# Patient Record
Sex: MALE | Race: BLACK OR AFRICAN AMERICAN | Employment: UNEMPLOYED | URBAN - METROPOLITAN AREA
[De-identification: names, ages, dates, MRNs, and addresses within clinical notes are randomized per-mention and may not be internally consistent; named-entity substitution may affect disease eponyms.]

---

## 2021-02-16 ENCOUNTER — APPOINTMENT (OUTPATIENT)
Dept: CT IMAGING | Age: 81
End: 2021-02-16
Payer: MEDICARE

## 2021-02-16 ENCOUNTER — HOSPITAL ENCOUNTER (EMERGENCY)
Age: 81
Discharge: HOME | End: 2021-02-17
Attending: EMERGENCY MEDICINE
Payer: MEDICARE

## 2021-02-16 ENCOUNTER — APPOINTMENT (OUTPATIENT)
Dept: GENERAL RADIOLOGY | Age: 81
End: 2021-02-16
Payer: MEDICARE
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
## 2021-02-16 DIAGNOSIS — S43.005A DISLOCATION OF LEFT SHOULDER JOINT, INITIAL ENCOUNTER: ICD-10-CM

## 2021-02-16 PROCEDURE — 23650 CLTX SHO DSLC W/MNPJ WO ANES: CPT

## 2021-02-16 PROCEDURE — 99285 EMERGENCY DEPT VISIT HI MDM: CPT

## 2021-02-16 PROCEDURE — 70450 CT HEAD/BRAIN W/O DYE: CPT

## 2021-02-16 PROCEDURE — 73030 X-RAY EXAM OF SHOULDER: CPT

## 2021-02-16 RX ORDER — LISINOPRIL 40 MG/1
40 TABLET ORAL DAILY
COMMUNITY

## 2021-02-16 RX ORDER — M-VIT,TX,IRON,MINS/CALC/FOLIC 27MG-0.4MG
1 TABLET ORAL DAILY
COMMUNITY

## 2021-02-16 RX ORDER — AMLODIPINE BESYLATE 10 MG/1
10 TABLET ORAL DAILY
COMMUNITY

## 2021-02-16 RX ORDER — FLUTICASONE PROPIONATE 50 MCG
1 SPRAY, SUSPENSION (ML) NASAL DAILY
COMMUNITY

## 2021-02-16 ASSESSMENT — PAIN DESCRIPTION - FREQUENCY: FREQUENCY: CONTINUOUS

## 2021-02-17 ENCOUNTER — APPOINTMENT (OUTPATIENT)
Dept: GENERAL RADIOLOGY | Age: 81
End: 2021-02-17
Payer: MEDICARE

## 2021-02-17 VITALS
BODY MASS INDEX: 24.04 KG/M2 | DIASTOLIC BLOOD PRESSURE: 69 MMHG | HEART RATE: 81 BPM | SYSTOLIC BLOOD PRESSURE: 133 MMHG | OXYGEN SATURATION: 100 % | RESPIRATION RATE: 12 BRPM | WEIGHT: 171.74 LBS | HEIGHT: 71 IN | TEMPERATURE: 98.4 F

## 2021-02-17 PROCEDURE — 99285 EMERGENCY DEPT VISIT HI MDM: CPT

## 2021-02-17 PROCEDURE — 2700000000 HC OXYGEN THERAPY PER DAY

## 2021-02-17 PROCEDURE — 94761 N-INVAS EAR/PLS OXIMETRY MLT: CPT

## 2021-02-17 PROCEDURE — 23650 CLTX SHO DSLC W/MNPJ WO ANES: CPT

## 2021-02-17 PROCEDURE — 73030 X-RAY EXAM OF SHOULDER: CPT

## 2021-02-17 PROCEDURE — 2500000003 HC RX 250 WO HCPCS: Performed by: EMERGENCY MEDICINE

## 2021-02-17 RX ORDER — ETOMIDATE 2 MG/ML
12 INJECTION INTRAVENOUS ONCE
Status: COMPLETED | OUTPATIENT
Start: 2021-02-17 | End: 2021-02-17

## 2021-02-17 RX ADMIN — ETOMIDATE INJECTION 12 MG: 2 SOLUTION INTRAVENOUS at 01:03

## 2021-02-17 ASSESSMENT — ENCOUNTER SYMPTOMS
FACIAL SWELLING: 0
BACK PAIN: 0
VOMITING: 0
COLOR CHANGE: 0

## 2021-02-17 NOTE — ED NOTES
Sling and swoth in place. Reviewed discharge instructions and f/u w transport team to go back to [facility].      Edi Garcia RN  02/17/21 [id number]

## 2021-02-17 NOTE — ED TRIAGE NOTES
From [facility], unwitnessed fall at 1900 2/16. pt states he fell out of his w/c but he has confusion at his baseline. cc: left shoulder pain. 100 mcg of fentanyl given by squad.  Distal pulses present

## 2021-02-17 NOTE — ED NOTES
Jillian F Nurse Thai notified of pt return w report for f/u and discharge instructions.       Bethany Xiong RN  02/17/21 [id number]

## 2021-02-17 NOTE — ED PROVIDER NOTES
[address] [facility]      Pt Name: Saima Gill  MRN: [id number]  Of[place] 1940  Date of evaluation: 2/16/2021  Provider: Velvet Maldonado MD    CHIEF COMPLAINT       Chief Complaint   Patient presents with    Fall     From [facility], unwitnessed fall at 1900 2/16. pt states he fell out of his w/c but he has confusion at his baseline. cc: left shoulder pain. 100 mcg of fentanyl given by squad. HISTORY OF PRESENT ILLNESS    Saima Gill is a [de-identified] y.o. male who presents to the emergency department with fall. Patient with unwitnessed fall at nursing home. Patient with confusion at his baseline per nursing home therefore history limited. Is complaining of left shoulder pain. Has no other complaints. No other associated symptoms. Nursing Notes were reviewed. Including nursing noted for FM, Surgical History, Past Medical History, Social History, vitals, and allergies; agree with all. REVIEW OF SYSTEMS       Review of Systems   Musculoskeletal: Positive for arthralgias (Shoulder pain). Psychiatric/Behavioral: Positive for confusion. Except as noted above the remainder of the review of systems was reviewed and negative. PAST MEDICAL HISTORY     Past Medical History:   Diagnosis Date    Alcohol abuse     AV block, 1st degree     Hypertension        SURGICAL HISTORY     History reviewed. No pertinent surgical history.     CURRENT MEDICATIONS       Discharge Medication List as of 2/17/2021  2:48 AM      CONTINUE these medications which have NOT CHANGED    Details   amLODIPine (NORVASC) 10 MG tablet Take 10 mg by mouth dailyHistorical Med      fluticasone (FLONASE) 50 MCG/ACT nasal spray 1 spray by Each Nostril route dailyHistorical Med      lisinopril (PRINIVIL;ZESTRIL) 40 MG tablet Take 40 mg by mouth dailyHistorical Med      metoprolol tartrate (LOPRESSOR) 25 MG tablet Take 25 mg by mouth 2 times dailyHistorical Med Multiple Vitamins-Minerals (THERAPEUTIC MULTIVITAMIN-MINERALS) tablet Take 1 tablet by mouth dailyHistorical Med             ALLERGIES     Patient has no known allergies. FAMILY HISTORY      History reviewed. No pertinent family history. SOCIAL HISTORY       Social History     Socioeconomic History    Marital status: Single     Spouse name: None    Number of children: None    Years of education: None    Highest education level: None   Occupational History    None   Social Needs    Financial resource strain: None    Food insecurity     Worry: None     Inability: None    Transportation needs     Medical: None     Non-medical: None   Tobacco Use    Smoking status: Former Smoker   Substance and Sexual Activity    Alcohol use: None    Drug use: None    Sexual activity: None   Lifestyle    Physical activity     Days per week: None     Minutes per session: None    Stress: None   Relationships    Social connections     Talks on phone: None     Gets together: None     Attends Rastafari service: None     Active member of club or organization: None     Attends meetings of clubs or organizations: None     Relationship status: None    Intimate partner violence     Fear of current or ex partner: None     Emotionally abused: None     Physically abused: None     Forced sexual activity: None   Other Topics Concern    None   Social History Narrative    None       PHYSICAL EXAM       ED Triage Vitals [02/16/21 2315]   BP Temp Temp Source Pulse Resp SpO2 Height Weight   126/67 98.4 °F (36.9 °C) Oral 126 18 100 % 5' 11\" (1.803 m) 171 lb 11.8 oz (77.9 kg)       Physical Exam  Vitals signs and nursing note reviewed. Constitutional:       General: He is not in acute distress. Appearance: He is well-developed. He is not diaphoretic. HENT:      Head: Normocephalic and atraumatic. Eyes:      General:         Right eye: No discharge. Left eye: No discharge. Pupils: Pupils are equal, round, and reactive to light. Neck:      Musculoskeletal: Normal range of motion. Thyroid: No thyromegaly. Trachea: No tracheal deviation. Cardiovascular:      Rate and Rhythm: Normal rate and regular rhythm. Heart sounds: No murmur. Pulmonary:      Breath sounds: No wheezing or rales. Chest:      Chest wall: No tenderness. Abdominal:      General: There is no distension. Palpations: Abdomen is soft. There is no mass. Tenderness: There is no abdominal tenderness. There is no guarding or rebound. Musculoskeletal:      Comments: Left shoulder deformity appears dislocated otherwise neurovascularly intact   Skin:     General: Skin is warm. Coloration: Skin is not pale. Findings: No erythema or rash. Neurological:      Mental Status: He is alert. Mental status is at baseline. Cranial Nerves: No cranial nerve deficit. Motor: No abnormal muscle tone. Coordination: Coordination normal.           DIAGNOSTIC RESULTS     EKG: All EKG's are interpreted by the Emergency Department Physician who either signs or Co-signs this chart in the absence of acardiologist.    None    RADIOLOGY:   Non-plain film images such as CT, Ultrasoundand MRI are read by the radiologist. Plain radiographic images are visualized and preliminarily interpreted by the emergency physician with the below findings:    Impression   1. Status post reduction of the left shoulder.  No acute fracture identified. Impression   No acute hemorrhage or midline shift.         Impression   Anterior left shoulder dislocation.         ED BEDSIDE ULTRASOUND:   Performed by ED Physician - none    LABS:  Labs Reviewed - No data to display    All other labs were withinnormal range or not returned as of this dictation.     EMERGENCY DEPARTMENT COURSE and DIFFERENTIAL DIAGNOSIS/MDM: PMH, Surgical Hx, FH, Social Hx reviewed by myself (ETOH usage, Tobacco usage, Drug usage reviewed by myself, no pertinent Hx)- No Pertinent Hx     Old records were reviewed by me    Shoulder reduced without difficulty. Asymptomatic upon discharge. Sling in place. Feels better. Close PCP follow-up. I estimate there is LOW risk for Sepsis, MI, Stroke, Tamponade, PTX, Toxicity or other life threatening etiology thus I consider the discharge disposition reasonable. The patient is at low risk for mortality based on demographic, history and clinical factors. Given the best available information and clinical assessment, I estimate the risk of hospitalization to be greater than risk of treatment at home. I have explained to the patient that the risk could rapidly change, given precautions for return and instructions. Explained to patient that the risk for mortality is low based on demographic, history and clinical factors. I discussed with patient the results of evaluation in the ED, diagnosis, care, and prognosis. The plan is to discharge to home. Patient is in agreement with plan and questions have been answered. I also discussed with patient the reasons which may require a return visit and the importance of follow-up care. The patient is well-appearing, nontoxic, and improved at the time of discharge. Patient agrees to call to arrange follow-up care as directed. Patient understands to return immediately for worsening/change in symptoms. CRITICAL CARE TIME   Total Critical Caretime was 39 minutes, excluding separately reportable procedures. There was a high probability of clinically significant/life threatening deterioration in the patient's condition which required my urgent intervention.         PROCEDURES:  Procedural sedation    Date/Time: 2/17/2021 4:15 AM  Performed by: Payton Hope MD  Authorized by: Payton Hope MD     Consent:     Consent obtained:  Verbal Consent given by: Family. Risks discussed: Allergic reaction, dysrhythmia, inadequate sedation, nausea, prolonged hypoxia resulting in organ damage, prolonged sedation necessitating reversal, respiratory compromise necessitating ventilatory assistance and intubation and vomiting  Indications:     Procedure performed:  Dislocation reduction    Procedure necessitating sedation performed by:  Physician performing sedation    Intended level of sedation:  Moderate (conscious sedation)  Pre-sedation assessment:     NPO status caution: urgency dictates proceeding with non-ideal NPO status      ASA classification: class 1 - normal, healthy patient      Neck mobility: normal      Mouth opening:  3 or more finger widths    Mallampati score:  I - soft palate, uvula, fauces, pillars visible    Pre-sedation assessments completed and reviewed: airway patency      History of difficult intubation: no    Immediate pre-procedure details:     Reassessment: Patient reassessed immediately prior to procedure      Reviewed: vital signs      Verified: bag valve mask available, emergency equipment available, intubation equipment available and IV patency confirmed    Procedure details (see MAR for exact dosages):     Preoxygenation:  Nasal cannula    Sedation:  Etomidate    Intra-procedure monitoring:  Blood pressure monitoring, cardiac monitor, continuous pulse oximetry and continuous capnometry  Post-procedure details:     Attendance: Constant attendance by certified staff until patient recovered      Recovery: Patient returned to pre-procedure baseline      Post-sedation assessments completed and reviewed: airway patency, cardiovascular function, hydration status, mental status, nausea/vomiting and pain level      Patient tolerance: Tolerated well, no immediate complications      FINAL IMPRESSION      1. Fall from wheelchair, initial encounter    2.  Dislocation of left shoulder joint, initial encounter          DISPOSITION/PLAN DISPOSITION Decision To Discharge 02/17/2021 01:30:04 AM    PATIENT REFERRED TO:  Iftikhar Marcos MD  [address] [facility]  [address] [place] [address]  [phone]    Schedule an appointment as soon as possible for a visit       Louis Pollock MD    Schedule an appointment as soon as possible for a visit       [facility] Emergency Department  [address] [address]  [phone]  Go to   As needed      DISCHARGE MEDICATIONS:  Discharge Medication List as of 2/17/2021  2:48 AM             (Please note that portions ofthis note were completed with a voice recognition program.  Efforts were made to edit the dictations but occasionally words are mis-transcribed.)    Rashaun Rehman MD(electronically signed)  Attending Emergency Physician        Rashaun Rehman MD  02/17/21 [id number]

## 2021-03-16 ENCOUNTER — OFFICE VISIT (OUTPATIENT)
Dept: ORTHOPEDIC SURGERY | Age: 81
End: 2021-03-16
Payer: MEDICARE

## 2021-03-16 VITALS — HEIGHT: 71 IN | BODY MASS INDEX: 22.82 KG/M2 | TEMPERATURE: 97 F | WEIGHT: 163 LBS

## 2021-03-16 DIAGNOSIS — S43.015A ANTERIOR DISLOCATION OF LEFT SHOULDER, INITIAL ENCOUNTER: Primary | ICD-10-CM

## 2021-03-16 PROCEDURE — 99204 OFFICE O/P NEW MOD 45 MIN: CPT | Performed by: ORTHOPAEDIC SURGERY

## 2021-03-16 SDOH — HEALTH STABILITY: MENTAL HEALTH: HOW OFTEN DO YOU HAVE A DRINK CONTAINING ALCOHOL?: NOT ASKED

## 2021-03-16 NOTE — PROGRESS NOTES
ORTHOPAEDIC NEW PATIENT NOTE    Chief Complaint   Patient presents with    Shoulder Injury     Left       HPI   3/16/2021  [de-identified] y.o. male RHD seen for evaluation of left shoulder dislocation:    Onset 2/16/2021  Injury/trauma - fell forward out of wheelchair   Seen in ED, and left shoulder reduced in the ED as well  History of symptoms denies  Pain is located left lateral shoulder/proximal arm  Worse with reaching, stretching  Better with rest, arm at side, time    I have reviewed and discussed the below pain assessment findings with the patient. Pain Assessment  Location of Pain: Shoulder  Location Modifiers: Left  Severity of Pain: 8  Quality of Pain: Aching, Sharp  Duration of Pain: Persistent  Frequency of Pain: Intermittent  Date Pain First Started: 02/16/21  Aggravating Factors: Stretching, Other (Comment)(Reaching)  Limiting Behavior: Yes  Relieving Factors: Rest  Result of Injury: Yes  Work-Related Injury: No  Are there other pain locations you wish to document?: No        No Known Allergies     Current Outpatient Medications   Medication Sig Dispense Refill    amLODIPine (NORVASC) 10 MG tablet Take 10 mg by mouth daily      fluticasone (FLONASE) 50 MCG/ACT nasal spray 1 spray by Each Nostril route daily      lisinopril (PRINIVIL;ZESTRIL) 40 MG tablet Take 40 mg by mouth daily      metoprolol tartrate (LOPRESSOR) 25 MG tablet Take 25 mg by mouth 2 times daily      Multiple Vitamins-Minerals (THERAPEUTIC MULTIVITAMIN-MINERALS) tablet Take 1 tablet by mouth daily       No current facility-administered medications for this visit. Past Medical History:   Diagnosis Date    Alcohol abuse     AV block, 1st degree     Hypertension         No past surgical history on file. No family history on file.     Social History     Socioeconomic History    Marital status: Single     Spouse name: Not on file    Number of children: Not on file    Years of education: Not on file    Highest education level: TTP over entire shoulder    Range of Motion: Forward flexion:  90    Abduction:  90    External rotation with arm at side:  30    Internal rotation:  LS junction   Strength:    Abduction:  4/5     External rotation:  5-/5    Special tests:    equivocal belly-press test        Imaging:  Images were personally reviewed by myself and discussed with the patient  Narrative   EXAMINATION:   THREE XRAY VIEWS OF THE LEFT SHOULDER       2/16/2021 11:24 pm       COMPARISON:   None.       HISTORY:   ORDERING SYSTEM PROVIDED HISTORY: fall, left shoulder deformity   TECHNOLOGIST PROVIDED HISTORY:   Reason for exam:->fall, left shoulder deformity   Reason for Exam: fall, left shoulder deformity       FINDINGS:   No fracture is present.  Typical anterior dislocation is evident.  Bone   density and soft tissues are normal.           Impression   Anterior left shoulder dislocation. Narrative   EXAMINATION:   THREE XRAY VIEWS OF THE LEFT SHOULDER       2/17/2021 1:14 am       COMPARISON:   February 16, 2021       HISTORY:   ORDERING SYSTEM PROVIDED HISTORY: post-reduction   TECHNOLOGIST PROVIDED HISTORY:   Reason for exam:->post-reduction   Reason for Exam: post-reduction       FINDINGS:   Three views of the left shoulder demonstrate interval reduction.  No acute   fracture identified.  Moderate to severe degenerative changes of the   glenohumeral joint are seen.  Calcific tendinitis is present.       The visualized chest is clear without focal consolidation.       No soft tissue injury.           Impression   1. Status post reduction of the left shoulder.  No acute fracture identified.           Left shoulder 4 views performed today in clinic   - glenohumeral articulation is mildly to moderately narrowed with small osteophytes seen, there are no loose bodies appreciated. Fausto's line is preserved. On axillary view, the humeral head is well-centered within the glenoid. The tuberosities are normal in appearance.

## 2024-01-05 NOTE — ED PROVIDER NOTES
[address] [facility]        Pt Name: Rosemary Coyne  MRN: [id number]  [place] 1940  Date of evaluation: 2/16/2021  Provider: SEBASTIAN Pahceco - HEIDI  PCP: Loulou Osorio MD     I have seen and evaluated this patient with my supervising physician Tawanda Julien MD.    [address] [place] [address]       Chief Complaint   Patient presents with   Cachorro Eddy     From [facility], unwitnessed fall at 1900 2/16. pt states he fell out of his w/c but he has confusion at his baseline. cc: left shoulder pain. 100 mcg of fentanyl given by squad. HISTORY OF PRESENT ILLNESS   (Location, Timing/Onset, Context/Setting, Quality, Duration, Modifying Factors, Severity, Associated Signs and Symptoms)  Note limiting factors. Rosemary Coyne is a [de-identified] y.o. male who presents to the emergency department today via EMS from nursing home complaining of left shoulder pain after fall from wheelchair. Patient advised he fell forward out of his wheelchair. This was unwitnessed by staff. Patient is alert and oriented to person only which is his baseline per nursing home. He has no lacerations. He denies any headache. No neck or back pain. HPI limited due to baseline confusion. Nursing Notes were all reviewed and agreed with or any disagreements were addressed in the HPI. REVIEW OF SYSTEMS    (2-9 systems for level 4, 10 or more for level 5)     Review of Systems   Constitutional: Negative for diaphoresis. HENT: Negative for facial swelling and nosebleeds. Gastrointestinal: Negative for vomiting. Musculoskeletal: Positive for arthralgias (left shoulder). Negative for back pain and neck pain. Skin: Negative for color change and wound. Neurological: Negative for headaches. Psychiatric/Behavioral: Positive for confusion (dementia). All other systems reviewed and are negative. Positives and Pertinent negatives as per HPI. Except as noted above in the ROS, all other systems were reviewed and negative. PAST MEDICAL HISTORY     Past Medical History:   Diagnosis Date    Alcohol abuse     AV block, 1st degree     Hypertension          SURGICAL HISTORY   History reviewed. No pertinent surgical history. CURRENTMEDICATIONS       Previous Medications    AMLODIPINE (NORVASC) 10 MG TABLET    Take 10 mg by mouth daily    FLUTICASONE (FLONASE) 50 MCG/ACT NASAL SPRAY    1 spray by Each Nostril route daily    LISINOPRIL (PRINIVIL;ZESTRIL) 40 MG TABLET    Take 40 mg by mouth daily    METOPROLOL TARTRATE (LOPRESSOR) 25 MG TABLET    Take 25 mg by mouth 2 times daily    MULTIPLE VITAMINS-MINERALS (THERAPEUTIC MULTIVITAMIN-MINERALS) TABLET    Take 1 tablet by mouth daily         ALLERGIES     Patient has no known allergies. FAMILYHISTORY     History reviewed. No pertinent family history. SOCIAL HISTORY       Social History     Tobacco Use    Smoking status: Former Smoker   Substance Use Topics    Alcohol use: Not on file    Drug use: Not on file       SCREENINGS             PHYSICAL EXAM    (up to 7 for level 4, 8 or more for level 5)     ED Triage Vitals [02/16/21 2315]   BP Temp Temp Source Pulse Resp SpO2 Height Weight   126/67 98.4 °F (36.9 °C) Oral 126 18 100 % 5' 11\" (1.803 m) 171 lb 11.8 oz (77.9 kg)       Physical Exam  Vitals signs and nursing note reviewed. Constitutional:       General: He is not in acute distress. Appearance: Normal appearance. He is well-developed. He is not toxic-appearing. HENT:      Head: Normocephalic and atraumatic. Right Ear: Tympanic membrane and ear canal normal. No hemotympanum. Left Ear: Tympanic membrane and ear canal normal. No hemotympanum. Eyes:      General: No scleral icterus. Conjunctiva/sclera: Conjunctivae normal.      Pupils: Pupils are equal, round, and reactive to light.    Neck: XR SHOULDER LEFT (MIN 2 VIEWS)   Final Result   Anterior left shoulder dislocation. Ct Head Wo Contrast    Result Date: 2/17/2021  EXAMINATION: CT OF THE HEAD WITHOUT CONTRAST  2/16/2021 11:47 pm TECHNIQUE: CT of the head was performed without the administration of intravenous contrast. Dose modulation, iterative reconstruction, and/or weight based adjustment of the mA/kV was utilized to reduce the radiation dose to as low as reasonably achievable. COMPARISON: None. HISTORY: ORDERING SYSTEM PROVIDED HISTORY: fall TECHNOLOGIST PROVIDED HISTORY: Reason for exam:->fall Has a \"code stroke\" or \"stroke alert\" been called? ->No Decision Support Exception->Emergency Medical Condition (MA) Reason for Exam: fall FINDINGS: BRAIN/VENTRICLES: No acute hemorrhage. Periventricular and subcortical hypoattenuation is nonspecific and may be related to microvascular disease. Artifact partially obscures the cleopatra. Artifact partially obscures the posterior fossa. Deidra Birkenhead white differentiation appears maintained given artifact near the skull base and through the posterior fossa. Cerebral volume loss and ventriculomegaly noted. There is no midline shift. Basal cisterns appear patent. ORBITS: Visualized orbits appear unremarkable on this unenhanced exam. SINUSES: Visualized paranasal sinuses appear clear. Trace partial opacification of mastoid air cells noted. SOFT TISSUES/SKULL:   No depressed calvarial fracture identified. No acute hemorrhage or midline shift. Xr Shoulder Left (min 2 Views)    Result Date: 2/16/2021  EXAMINATION: THREE XRAY VIEWS OF THE LEFT SHOULDER 2/16/2021 11:24 pm COMPARISON: None. HISTORY: ORDERING SYSTEM PROVIDED HISTORY: fall, left shoulder deformity TECHNOLOGIST PROVIDED HISTORY: Reason for exam:->fall, left shoulder deformity Reason for Exam: fall, left shoulder deformity FINDINGS: No fracture is present. Typical anterior dislocation is evident.   Bone density and soft tissues are normal. Anterior left shoulder dislocation. PROCEDURES   Unless otherwise noted below, none     Ortho Injury    Date/Time: 2/17/2021 1:10 AM  Performed by: SEBASTIAN Ruano CNP  Authorized by: SEBASTIAN Ruano CNP   Consent: Verbal consent obtained. Risks and benefits: risks, benefits and alternatives were discussed  Consent given by: power of   Imaging studies: imaging studies available  Patient identity confirmed: verbally with patient and arm band  Time out: Immediately prior to procedure a \"time out\" was called to verify the correct patient, procedure, equipment, support staff and site/side marked as required. Injury location: shoulder  Location details: left shoulder  Injury type: dislocation  Dislocation type: anterior  Pre-procedure neurovascular assessment: neurovascularly intact  Pre-procedure distal perfusion: normal  Pre-procedure neurological function: normal  Pre-procedure range of motion: reduced    Anesthesia:  Local anesthesia used: no    Sedation:  Patient sedated: yes (See Dr. Claer Yen note)    Manipulation performed: yes  Reduction method: Cunningham.  Reduction successful: yes  X-ray confirmed reduction: yes  Immobilization: sling  Post-procedure neurovascular assessment: post-procedure neurovascularly intact  Post-procedure distal perfusion: normal  Post-procedure neurological function: normal  Post-procedure range of motion: improved  Patient tolerance: patient tolerated the procedure well with no immediate complications  Comments: Consent was obtained from patient's son, Misty Peñaloza. Patient also gave verbal consent for procedure.           CRITICAL CARE TIME   N/A    CONSULTS:  None      EMERGENCY DEPARTMENT COURSE and DIFFERENTIAL DIAGNOSIS/MDM:   Vitals:    Vitals:    02/17/21 0112 02/17/21 0115 02/17/21 0120 02/17/21 0125   BP: (!) 169/61 126/79 (!) 149/74 (!) 145/83   Pulse: 98 89 99 97   Resp: 13 15 17 18   Temp:       TempSrc:       SpO2: 100% 100% 100% 100% Weight:       Height:           Patient was given the following medications:  Medications   etomidate (AMIDATE) injection 12 mg (12 mg Intravenous Given by Other 2/17/21 0103)           Differential diagnosis: includes but not limited to Arterial Injury/Ischemia, Fracture, Dislocation, Infection, Compartment Syndrome, Neurologic Deficit/Injury. Patient seen and examined today for left shoulder pain after fall from wheelchair. See HPI for patient presentation. Patient is hemodynamically stable, nontoxic, afebrile, and without tachycardia, tachypnea, and hypoxia. Physical exam as above. Very pleasantly confused 77-year-old male lying in bed in no acute distress. Deformity to left shoulder. X-ray shows anterior dislocation. Lungs CTA bilaterally cardiac exam is normal.  Neck supple. Full range of motion. No midline spine tenderness. CT head normal.  Consent for sedation and closed reduction was given by both patient's son, Dhara Vidales, and the patient himself. Patient tolerated procedure well. Reduction was confirmed with x-ray and sling was placed. Patient given referral to orthopedic surgery and discharged back to nursing home in stable condition. At this time, the evidence for any other entities in the differential is insufficient to justify any further testing. This was explained to the patient. The patient was advised that persistent or worsening symptoms will require further evaluation. I discussed with Zoey Akbar and/or family the exam results, diagnosis, care, prognosis, reasons to return and the importance of follow up. Patient and/or family is in full agreement with plan and all questions have been answered. Specific discharge instructions explained, including reasons to return to the emergency department. Zoey Akbar is well appearing, non-toxic, and afebrile at the time of discharge. Patient was instructed to follow up with primary care provider in 24-48 hours, and to instructed to return to ED immediately for any new or worsening concerns. Zoey Akbar verbalized understanding and discharged home. The patient tolerated their visit well. They were seen and evaluated by the attending physician, Gavino Amezquita MD who agreed with the assessment and plan. The patient and / or the family were informed of the results of any tests, a time was given to answer questions, a plan was proposed and they agreed with plan. FINAL IMPRESSION      1. Fall from wheelchair, initial encounter    2.  Dislocation of left shoulder joint, initial encounter          DISPOSITION/PLAN   DISPOSITION Decision To Discharge 02/17/2021 01:30:04 AM      PATIENT REFERREDTO:  Gavino Ruiz MD  [address] [address]  [phone]    Schedule an appointment as soon as possible for a visit       Ashley Lindsey MD    Schedule an appointment as soon as possible for a visit       [facility] Emergency Department  [address] [address]  [phone]  Go to   As needed      DISCHARGE MEDICATIONS:  New Prescriptions    No medications on file       DISCONTINUED MEDICATIONS:  Discontinued Medications    No medications on file              (Please note that portions of this note were completed with a voice recognition program.  Efforts were made to edit the dictations but occasionally words are mis-transcribed.) SEBASTIAN Xiao CNP (electronically signed)            SEBASTIAN Xiao CNP  02/17/21 013 36.6

## 2024-04-11 ENCOUNTER — APPOINTMENT (OUTPATIENT)
Dept: CT IMAGING | Age: 84
End: 2024-04-11
Payer: MEDICARE

## 2024-04-11 ENCOUNTER — APPOINTMENT (OUTPATIENT)
Dept: GENERAL RADIOLOGY | Age: 84
End: 2024-04-11
Payer: MEDICARE

## 2024-04-11 ENCOUNTER — HOSPITAL ENCOUNTER (INPATIENT)
Age: 84
LOS: 7 days | Discharge: NURSING, REHAB OR LONG-TERM CARE | End: 2024-04-18
Attending: STUDENT IN AN ORGANIZED HEALTH CARE EDUCATION/TRAINING PROGRAM | Admitting: STUDENT IN AN ORGANIZED HEALTH CARE EDUCATION/TRAINING PROGRAM
Payer: MEDICARE

## 2024-04-11 DIAGNOSIS — Z71.89 GOALS OF CARE, COUNSELING/DISCUSSION: ICD-10-CM

## 2024-04-11 DIAGNOSIS — A41.9 SEPTICEMIA (HCC): Primary | ICD-10-CM

## 2024-04-11 DIAGNOSIS — R41.82 ALTERED MENTAL STATUS, UNSPECIFIED ALTERED MENTAL STATUS TYPE: ICD-10-CM

## 2024-04-11 DIAGNOSIS — N17.9 ACUTE RENAL FAILURE, UNSPECIFIED ACUTE RENAL FAILURE TYPE (HCC): ICD-10-CM

## 2024-04-11 DIAGNOSIS — E87.5 HYPERKALEMIA: ICD-10-CM

## 2024-04-11 DIAGNOSIS — A41.9 SEPSIS WITH ACUTE RENAL FAILURE, DUE TO UNSPECIFIED ORGANISM, UNSPECIFIED ACUTE RENAL FAILURE TYPE, UNSPECIFIED WHETHER SEPTIC SHOCK PRESENT (HCC): ICD-10-CM

## 2024-04-11 DIAGNOSIS — R65.20 SEPSIS WITH ACUTE RENAL FAILURE, DUE TO UNSPECIFIED ORGANISM, UNSPECIFIED ACUTE RENAL FAILURE TYPE, UNSPECIFIED WHETHER SEPTIC SHOCK PRESENT (HCC): ICD-10-CM

## 2024-04-11 DIAGNOSIS — N17.9 SEPSIS WITH ACUTE RENAL FAILURE, DUE TO UNSPECIFIED ORGANISM, UNSPECIFIED ACUTE RENAL FAILURE TYPE, UNSPECIFIED WHETHER SEPTIC SHOCK PRESENT (HCC): ICD-10-CM

## 2024-04-11 DIAGNOSIS — N39.0 ACUTE UTI: ICD-10-CM

## 2024-04-11 DIAGNOSIS — E16.2 HYPOGLYCEMIA: ICD-10-CM

## 2024-04-11 LAB
ALBUMIN SERPL-MCNC: 2.4 G/DL (ref 3.4–5)
ALBUMIN SERPL-MCNC: 3.3 G/DL (ref 3.4–5)
ALBUMIN/GLOB SERPL: 0.9 {RATIO} (ref 1.1–2.2)
ALP SERPL-CCNC: 258 U/L (ref 40–129)
ALT SERPL-CCNC: 59 U/L (ref 10–40)
AMMONIA PLAS-SCNC: 21 UMOL/L (ref 16–60)
AMORPH SED URNS QL MICRO: ABNORMAL /HPF
ANION GAP SERPL CALCULATED.3IONS-SCNC: 15 MMOL/L (ref 3–16)
ANION GAP SERPL CALCULATED.3IONS-SCNC: 21 MMOL/L (ref 3–16)
ANISOCYTOSIS BLD QL SMEAR: ABNORMAL
AST SERPL-CCNC: 258 U/L (ref 15–37)
BACTERIA URNS QL MICRO: ABNORMAL /HPF
BASOPHILS # BLD: 0 K/UL (ref 0–0.2)
BASOPHILS NFR BLD: 0 %
BILIRUB SERPL-MCNC: 1 MG/DL (ref 0–1)
BILIRUB UR QL STRIP.AUTO: NEGATIVE
BUN SERPL-MCNC: 60 MG/DL (ref 7–20)
BUN SERPL-MCNC: 63 MG/DL (ref 7–20)
CALCIUM SERPL-MCNC: 7.7 MG/DL (ref 8.3–10.6)
CALCIUM SERPL-MCNC: 8.4 MG/DL (ref 8.3–10.6)
CHLORIDE SERPL-SCNC: 96 MMOL/L (ref 99–110)
CHLORIDE SERPL-SCNC: 99 MMOL/L (ref 99–110)
CLARITY UR: ABNORMAL
CO2 SERPL-SCNC: 18 MMOL/L (ref 21–32)
CO2 SERPL-SCNC: 21 MMOL/L (ref 21–32)
COLOR UR: YELLOW
CREAT SERPL-MCNC: 5 MG/DL (ref 0.8–1.3)
CREAT SERPL-MCNC: 5.8 MG/DL (ref 0.8–1.3)
DEPRECATED RDW RBC AUTO: 15 % (ref 12.4–15.4)
EKG ATRIAL RATE: 100 BPM
EKG DIAGNOSIS: NORMAL
EKG P AXIS: 49 DEGREES
EKG P-R INTERVAL: 266 MS
EKG Q-T INTERVAL: 326 MS
EKG QRS DURATION: 70 MS
EKG QTC CALCULATION (BAZETT): 420 MS
EKG R AXIS: 14 DEGREES
EKG T AXIS: -16 DEGREES
EKG VENTRICULAR RATE: 100 BPM
EOSINOPHIL # BLD: 0 K/UL (ref 0–0.6)
EOSINOPHIL NFR BLD: 0 %
FERRITIN SERPL IA-MCNC: 991.8 NG/ML (ref 30–400)
GFR SERPLBLD CREATININE-BSD FMLA CKD-EPI: 11 ML/MIN/{1.73_M2}
GFR SERPLBLD CREATININE-BSD FMLA CKD-EPI: 9 ML/MIN/{1.73_M2}
GLUCOSE BLD-MCNC: 108 MG/DL (ref 70–99)
GLUCOSE BLD-MCNC: 119 MG/DL (ref 70–99)
GLUCOSE BLD-MCNC: 63 MG/DL (ref 70–99)
GLUCOSE BLD-MCNC: 90 MG/DL (ref 70–99)
GLUCOSE SERPL-MCNC: 390 MG/DL (ref 70–99)
GLUCOSE SERPL-MCNC: 56 MG/DL (ref 70–99)
GLUCOSE UR STRIP.AUTO-MCNC: NEGATIVE MG/DL
HCT VFR BLD AUTO: 30.9 % (ref 40.5–52.5)
HGB BLD-MCNC: 10 G/DL (ref 13.5–17.5)
HGB UR QL STRIP.AUTO: ABNORMAL
HYPOCHROMIA BLD QL SMEAR: ABNORMAL
IRON SATN MFR SERPL: 8 % (ref 20–50)
IRON SERPL-MCNC: 18 UG/DL (ref 59–158)
KETONES UR STRIP.AUTO-MCNC: 15 MG/DL
LACTATE BLDV-SCNC: 4.1 MMOL/L (ref 0.4–1.9)
LACTATE BLDV-SCNC: 4.2 MMOL/L (ref 0.4–1.9)
LEUKOCYTE ESTERASE UR QL STRIP.AUTO: ABNORMAL
LYMPHOCYTES # BLD: 0.3 K/UL (ref 1–5.1)
LYMPHOCYTES NFR BLD: 5 %
MCH RBC QN AUTO: 26.3 PG (ref 26–34)
MCHC RBC AUTO-ENTMCNC: 32.3 G/DL (ref 31–36)
MCV RBC AUTO: 81.5 FL (ref 80–100)
METAMYELOCYTES NFR BLD MANUAL: 3 %
MONOCYTES # BLD: 0.2 K/UL (ref 0–1.3)
MONOCYTES NFR BLD: 3 %
NEUTROPHILS # BLD: 6.1 K/UL (ref 1.7–7.7)
NEUTROPHILS NFR BLD: 62 %
NEUTS BAND NFR BLD MANUAL: 27 % (ref 0–7)
NITRITE UR QL STRIP.AUTO: NEGATIVE
PERFORMED ON: ABNORMAL
PERFORMED ON: NORMAL
PH UR STRIP.AUTO: 6 [PH] (ref 5–8)
PHOSPHATE SERPL-MCNC: 4.3 MG/DL (ref 2.5–4.9)
PLATELET # BLD AUTO: 64 K/UL (ref 135–450)
PLATELET BLD QL SMEAR: ABNORMAL
PMV BLD AUTO: 10.6 FL (ref 5–10.5)
POTASSIUM SERPL-SCNC: 4.8 MMOL/L (ref 3.5–5.1)
POTASSIUM SERPL-SCNC: 5.8 MMOL/L (ref 3.5–5.1)
PROT SERPL-MCNC: 6.8 G/DL (ref 6.4–8.2)
PROT UR STRIP.AUTO-MCNC: 100 MG/DL
RBC # BLD AUTO: 3.8 M/UL (ref 4.2–5.9)
RBC #/AREA URNS HPF: ABNORMAL /HPF (ref 0–4)
REPORT: NORMAL
SLIDE REVIEW: ABNORMAL
SODIUM SERPL-SCNC: 132 MMOL/L (ref 136–145)
SODIUM SERPL-SCNC: 138 MMOL/L (ref 136–145)
SP GR UR STRIP.AUTO: 1.01 (ref 1–1.03)
TIBC SERPL-MCNC: 215 UG/DL (ref 260–445)
TSH SERPL DL<=0.005 MIU/L-ACNC: 1.29 UIU/ML (ref 0.27–4.2)
UA COMPLETE W REFLEX CULTURE PNL UR: YES
UA DIPSTICK W REFLEX MICRO PNL UR: YES
URN SPEC COLLECT METH UR: ABNORMAL
UROBILINOGEN UR STRIP-ACNC: 0.2 E.U./DL
WBC # BLD AUTO: 6.6 K/UL (ref 4–11)
WBC #/AREA URNS HPF: >100 /HPF (ref 0–5)

## 2024-04-11 PROCEDURE — 2500000003 HC RX 250 WO HCPCS: Performed by: STUDENT IN AN ORGANIZED HEALTH CARE EDUCATION/TRAINING PROGRAM

## 2024-04-11 PROCEDURE — 85025 COMPLETE CBC W/AUTO DIFF WBC: CPT

## 2024-04-11 PROCEDURE — 93010 ELECTROCARDIOGRAM REPORT: CPT | Performed by: INTERNAL MEDICINE

## 2024-04-11 PROCEDURE — 87088 URINE BACTERIA CULTURE: CPT

## 2024-04-11 PROCEDURE — 6370000000 HC RX 637 (ALT 250 FOR IP)

## 2024-04-11 PROCEDURE — 87150 DNA/RNA AMPLIFIED PROBE: CPT

## 2024-04-11 PROCEDURE — 71045 X-RAY EXAM CHEST 1 VIEW: CPT

## 2024-04-11 PROCEDURE — 99285 EMERGENCY DEPT VISIT HI MDM: CPT

## 2024-04-11 PROCEDURE — 74176 CT ABD & PELVIS W/O CONTRAST: CPT

## 2024-04-11 PROCEDURE — 87186 SC STD MICRODIL/AGAR DIL: CPT

## 2024-04-11 PROCEDURE — 70450 CT HEAD/BRAIN W/O DYE: CPT

## 2024-04-11 PROCEDURE — 96365 THER/PROPH/DIAG IV INF INIT: CPT

## 2024-04-11 PROCEDURE — 83550 IRON BINDING TEST: CPT

## 2024-04-11 PROCEDURE — 2580000003 HC RX 258: Performed by: STUDENT IN AN ORGANIZED HEALTH CARE EDUCATION/TRAINING PROGRAM

## 2024-04-11 PROCEDURE — 81001 URINALYSIS AUTO W/SCOPE: CPT

## 2024-04-11 PROCEDURE — 2060000000 HC ICU INTERMEDIATE R&B

## 2024-04-11 PROCEDURE — 80053 COMPREHEN METABOLIC PANEL: CPT

## 2024-04-11 PROCEDURE — 83540 ASSAY OF IRON: CPT

## 2024-04-11 PROCEDURE — 82140 ASSAY OF AMMONIA: CPT

## 2024-04-11 PROCEDURE — 96374 THER/PROPH/DIAG INJ IV PUSH: CPT

## 2024-04-11 PROCEDURE — 87086 URINE CULTURE/COLONY COUNT: CPT

## 2024-04-11 PROCEDURE — 83605 ASSAY OF LACTIC ACID: CPT

## 2024-04-11 PROCEDURE — 93005 ELECTROCARDIOGRAM TRACING: CPT

## 2024-04-11 PROCEDURE — 84443 ASSAY THYROID STIM HORMONE: CPT

## 2024-04-11 PROCEDURE — 6360000002 HC RX W HCPCS: Performed by: STUDENT IN AN ORGANIZED HEALTH CARE EDUCATION/TRAINING PROGRAM

## 2024-04-11 PROCEDURE — 82728 ASSAY OF FERRITIN: CPT

## 2024-04-11 PROCEDURE — 87040 BLOOD CULTURE FOR BACTERIA: CPT

## 2024-04-11 PROCEDURE — 96361 HYDRATE IV INFUSION ADD-ON: CPT

## 2024-04-11 PROCEDURE — 96375 TX/PRO/DX INJ NEW DRUG ADDON: CPT

## 2024-04-11 PROCEDURE — 6370000000 HC RX 637 (ALT 250 FOR IP): Performed by: STUDENT IN AN ORGANIZED HEALTH CARE EDUCATION/TRAINING PROGRAM

## 2024-04-11 PROCEDURE — 6360000002 HC RX W HCPCS

## 2024-04-11 PROCEDURE — 2580000003 HC RX 258

## 2024-04-11 RX ORDER — FINASTERIDE 5 MG/1
5 TABLET, FILM COATED ORAL DAILY
COMMUNITY

## 2024-04-11 RX ORDER — ACETAMINOPHEN 650 MG/1
650 SUPPOSITORY RECTAL EVERY 6 HOURS PRN
Status: DISCONTINUED | OUTPATIENT
Start: 2024-04-11 | End: 2024-04-18 | Stop reason: HOSPADM

## 2024-04-11 RX ORDER — CHOLECALCIFEROL (VITAMIN D3) 125 MCG
500 CAPSULE ORAL DAILY
COMMUNITY

## 2024-04-11 RX ORDER — UREA 10 %
1 LOTION (ML) TOPICAL EVERY 4 HOURS PRN
COMMUNITY

## 2024-04-11 RX ORDER — POLYETHYLENE GLYCOL 3350 17 G/17G
17 POWDER, FOR SOLUTION ORAL DAILY PRN
COMMUNITY

## 2024-04-11 RX ORDER — GLUCAGON 1 MG/ML
1 KIT INJECTION PRN
Status: DISCONTINUED | OUTPATIENT
Start: 2024-04-11 | End: 2024-04-16 | Stop reason: SDUPTHER

## 2024-04-11 RX ORDER — SODIUM CHLORIDE, SODIUM LACTATE, POTASSIUM CHLORIDE, AND CALCIUM CHLORIDE .6; .31; .03; .02 G/100ML; G/100ML; G/100ML; G/100ML
1500 INJECTION, SOLUTION INTRAVENOUS ONCE
Status: COMPLETED | OUTPATIENT
Start: 2024-04-11 | End: 2024-04-11

## 2024-04-11 RX ORDER — SODIUM CHLORIDE, SODIUM LACTATE, POTASSIUM CHLORIDE, CALCIUM CHLORIDE 600; 310; 30; 20 MG/100ML; MG/100ML; MG/100ML; MG/100ML
INJECTION, SOLUTION INTRAVENOUS CONTINUOUS
Status: DISCONTINUED | OUTPATIENT
Start: 2024-04-11 | End: 2024-04-12

## 2024-04-11 RX ORDER — ACETAMINOPHEN 650 MG/1
650 SUPPOSITORY RECTAL ONCE
Status: COMPLETED | OUTPATIENT
Start: 2024-04-11 | End: 2024-04-11

## 2024-04-11 RX ORDER — MIRTAZAPINE 7.5 MG/1
7.5 TABLET, FILM COATED ORAL NIGHTLY
COMMUNITY

## 2024-04-11 RX ORDER — NIFEDIPINE 30 MG
30 TABLET, EXTENDED RELEASE ORAL DAILY
COMMUNITY

## 2024-04-11 RX ORDER — FINASTERIDE 5 MG/1
5 TABLET, FILM COATED ORAL DAILY
Status: DISCONTINUED | OUTPATIENT
Start: 2024-04-12 | End: 2024-04-18 | Stop reason: HOSPADM

## 2024-04-11 RX ORDER — TAMSULOSIN HYDROCHLORIDE 0.4 MG/1
0.4 CAPSULE ORAL DAILY
COMMUNITY

## 2024-04-11 RX ORDER — DEXTROSE MONOHYDRATE 100 MG/ML
INJECTION, SOLUTION INTRAVENOUS CONTINUOUS PRN
Status: DISCONTINUED | OUTPATIENT
Start: 2024-04-11 | End: 2024-04-16 | Stop reason: SDUPTHER

## 2024-04-11 RX ORDER — ERGOCALCIFEROL 1.25 MG/1
50000 CAPSULE ORAL WEEKLY
COMMUNITY

## 2024-04-11 RX ORDER — CALCIUM GLUCONATE 94 MG/ML
1000 INJECTION, SOLUTION INTRAVENOUS ONCE
Status: COMPLETED | OUTPATIENT
Start: 2024-04-11 | End: 2024-04-11

## 2024-04-11 RX ORDER — SENNOSIDES A AND B 8.6 MG/1
1 TABLET, FILM COATED ORAL
COMMUNITY

## 2024-04-11 RX ORDER — SODIUM CHLORIDE 0.9 % (FLUSH) 0.9 %
5-40 SYRINGE (ML) INJECTION PRN
Status: DISCONTINUED | OUTPATIENT
Start: 2024-04-11 | End: 2024-04-18 | Stop reason: HOSPADM

## 2024-04-11 RX ORDER — HYDRALAZINE HYDROCHLORIDE 10 MG/1
10 TABLET, FILM COATED ORAL 2 TIMES DAILY
COMMUNITY

## 2024-04-11 RX ORDER — ONDANSETRON 4 MG/1
4 TABLET, ORALLY DISINTEGRATING ORAL EVERY 8 HOURS PRN
Status: DISCONTINUED | OUTPATIENT
Start: 2024-04-11 | End: 2024-04-18 | Stop reason: HOSPADM

## 2024-04-11 RX ORDER — SODIUM CHLORIDE 0.9 % (FLUSH) 0.9 %
5-40 SYRINGE (ML) INJECTION EVERY 12 HOURS SCHEDULED
Status: DISCONTINUED | OUTPATIENT
Start: 2024-04-11 | End: 2024-04-18 | Stop reason: HOSPADM

## 2024-04-11 RX ORDER — ONDANSETRON 2 MG/ML
4 INJECTION INTRAMUSCULAR; INTRAVENOUS EVERY 6 HOURS PRN
Status: DISCONTINUED | OUTPATIENT
Start: 2024-04-11 | End: 2024-04-18 | Stop reason: HOSPADM

## 2024-04-11 RX ORDER — ACETAMINOPHEN 325 MG/1
650 TABLET ORAL EVERY 6 HOURS PRN
Status: DISCONTINUED | OUTPATIENT
Start: 2024-04-11 | End: 2024-04-18 | Stop reason: HOSPADM

## 2024-04-11 RX ORDER — LIDOCAINE HYDROCHLORIDE 20 MG/ML
JELLY TOPICAL PRN
Status: DISCONTINUED | OUTPATIENT
Start: 2024-04-11 | End: 2024-04-18 | Stop reason: HOSPADM

## 2024-04-11 RX ORDER — LANOLIN ALCOHOL/MO/W.PET/CERES
6 CREAM (GRAM) TOPICAL
COMMUNITY

## 2024-04-11 RX ORDER — ACETAMINOPHEN 325 MG/1
650 TABLET ORAL EVERY 6 HOURS PRN
COMMUNITY

## 2024-04-11 RX ORDER — PYRIDOXINE HCL (VITAMIN B6) 100 MG
1 TABLET ORAL DAILY
COMMUNITY

## 2024-04-11 RX ORDER — ACETAMINOPHEN 325 MG/1
650 TABLET ORAL ONCE
Status: COMPLETED | OUTPATIENT
Start: 2024-04-11 | End: 2024-04-11

## 2024-04-11 RX ORDER — SODIUM CHLORIDE 9 MG/ML
INJECTION, SOLUTION INTRAVENOUS PRN
Status: DISCONTINUED | OUTPATIENT
Start: 2024-04-11 | End: 2024-04-18 | Stop reason: HOSPADM

## 2024-04-11 RX ORDER — BISACODYL 10 MG
10 SUPPOSITORY, RECTAL RECTAL DAILY PRN
COMMUNITY

## 2024-04-11 RX ORDER — POLYETHYLENE GLYCOL 3350 17 G/17G
17 POWDER, FOR SOLUTION ORAL DAILY PRN
Status: DISCONTINUED | OUTPATIENT
Start: 2024-04-11 | End: 2024-04-18 | Stop reason: HOSPADM

## 2024-04-11 RX ORDER — HEPARIN SODIUM 5000 [USP'U]/ML
5000 INJECTION, SOLUTION INTRAVENOUS; SUBCUTANEOUS EVERY 8 HOURS SCHEDULED
Status: DISCONTINUED | OUTPATIENT
Start: 2024-04-11 | End: 2024-04-18 | Stop reason: HOSPADM

## 2024-04-11 RX ORDER — SODIUM CHLORIDE, SODIUM LACTATE, POTASSIUM CHLORIDE, AND CALCIUM CHLORIDE .6; .31; .03; .02 G/100ML; G/100ML; G/100ML; G/100ML
1000 INJECTION, SOLUTION INTRAVENOUS ONCE
Status: COMPLETED | OUTPATIENT
Start: 2024-04-11 | End: 2024-04-11

## 2024-04-11 RX ADMIN — Medication 10 ML: at 20:33

## 2024-04-11 RX ADMIN — SODIUM ZIRCONIUM CYCLOSILICATE 10 G: 10 POWDER, FOR SUSPENSION ORAL at 10:34

## 2024-04-11 RX ADMIN — DEXTROSE MONOHYDRATE 125 ML: 100 INJECTION, SOLUTION INTRAVENOUS at 20:31

## 2024-04-11 RX ADMIN — DEXTROSE MONOHYDRATE 250 ML: 100 INJECTION, SOLUTION INTRAVENOUS at 10:32

## 2024-04-11 RX ADMIN — HEPARIN SODIUM 5000 UNITS: 5000 INJECTION INTRAVENOUS; SUBCUTANEOUS at 16:04

## 2024-04-11 RX ADMIN — HEPARIN SODIUM 5000 UNITS: 5000 INJECTION INTRAVENOUS; SUBCUTANEOUS at 20:33

## 2024-04-11 RX ADMIN — DEXTROSE MONOHYDRATE 250 ML: 100 INJECTION, SOLUTION INTRAVENOUS at 11:07

## 2024-04-11 RX ADMIN — WATER 1000 MG: 1 INJECTION INTRAMUSCULAR; INTRAVENOUS; SUBCUTANEOUS at 10:00

## 2024-04-11 RX ADMIN — SODIUM BICARBONATE 50 MEQ: 84 INJECTION INTRAVENOUS at 10:33

## 2024-04-11 RX ADMIN — CALCIUM GLUCONATE 1000 MG: 98 INJECTION, SOLUTION INTRAVENOUS at 10:38

## 2024-04-11 RX ADMIN — SODIUM CHLORIDE, POTASSIUM CHLORIDE, SODIUM LACTATE AND CALCIUM CHLORIDE 1000 ML: 600; 310; 30; 20 INJECTION, SOLUTION INTRAVENOUS at 09:28

## 2024-04-11 RX ADMIN — LIDOCAINE HYDROCHLORIDE: 20 JELLY TOPICAL at 09:13

## 2024-04-11 RX ADMIN — ACETAMINOPHEN 650 MG: 650 SUPPOSITORY RECTAL at 09:13

## 2024-04-11 RX ADMIN — AZITHROMYCIN MONOHYDRATE 500 MG: 500 INJECTION, POWDER, LYOPHILIZED, FOR SOLUTION INTRAVENOUS at 10:10

## 2024-04-11 RX ADMIN — SODIUM CHLORIDE, POTASSIUM CHLORIDE, SODIUM LACTATE AND CALCIUM CHLORIDE 1500 ML: 600; 310; 30; 20 INJECTION, SOLUTION INTRAVENOUS at 11:05

## 2024-04-11 ASSESSMENT — LIFESTYLE VARIABLES
HOW MANY STANDARD DRINKS CONTAINING ALCOHOL DO YOU HAVE ON A TYPICAL DAY: PATIENT DOES NOT DRINK
HOW OFTEN DO YOU HAVE A DRINK CONTAINING ALCOHOL: NEVER

## 2024-04-11 ASSESSMENT — PAIN SCALES - WONG BAKER: WONGBAKER_NUMERICALRESPONSE: NO HURT

## 2024-04-11 ASSESSMENT — PAIN SCALES - GENERAL
PAINLEVEL_OUTOF10: 0
PAINLEVEL_OUTOF10: 0

## 2024-04-11 ASSESSMENT — PAIN - FUNCTIONAL ASSESSMENT: PAIN_FUNCTIONAL_ASSESSMENT: NONE - DENIES PAIN

## 2024-04-11 NOTE — ED NOTES
Pt to room 9 via [facility] from [facility] at this time. EMS reports patient had no urine output over night. AMS. Pt alert to self.

## 2024-04-11 NOTE — PROGRESS NOTES
Pt received from ED, VSS. Speech garbled, unable to assess orientation. Call light in reach, bed in low position. Will Ctm closely.

## 2024-04-11 NOTE — ED NOTES
Pt's son, Sanjay called by this RN per ED NP, Abhi to discuss code status and medical decision making.     Pt's son, Sanjay is pt's durable POA.   Pt's son, Sanjay states he is a full code.

## 2024-04-11 NOTE — ED NOTES
Bedside report to Nissa RN   To Room C-20  Cardiac monitor on during transfer  Pt's pain level denies  VSS, Afebrile   IV site is clean, dry and intact, Normal saline locked   Family updated on transfer per pt

## 2024-04-11 NOTE — ED PROVIDER NOTES
[facility] EMERGENCY DEPARTMENT  EMERGENCY DEPARTMENT ENCOUNTER        Pt Name: Reji Kaufman  MRN: [id number]  Birthdate 1940  Date of evaluation: 4/11/2024  Provider: SEBASTIAN Pinzon - CNP  PCP: Luis Felipe Jackson MD  Note Started: 8:51 AM EDT 4/11/24       I have seen and evaluated this patient with my supervising physician Steve Powers, *.      CHIEF COMPLAINT       Chief Complaint   Patient presents with    Altered Mental Status     Pt arrived via [facility] from [facility]. EMS reports patient had no urine output over night. AMS. Pt alert to self.        HISTORY OF PRESENT ILLNESS: 1 or more Elements     History From: Patient, EMS report, SNF paperwork    Limitations to history : Altered Mental Status    Social Determinants Significantly Affecting Health : Patient has significant healthcare illiteracy    Chief Complaint: Above    Reji Kaufman is a 83 y.o. male who  has a past medical history of Alcohol abuse, AV block, 1st degree, and Hypertension. presents today with concern for all mental status.  Also reported decreased urine output.  SNF reportedly gave him fluids prior to arrival.  Unknown how much.    At the time assessment patient is repeating his name and birthday.  He is unable to participate meaningfully in HPI and ROS.    The patient  reports that he has been smoking cigarettes. He has quit using smokeless tobacco. He reports that he does not currently use alcohol. He reports that he does not use drugs.       Nursing Notes were all reviewed and agreed with or any disagreements were addressed in the HPI.    REVIEW OF SYSTEMS :      Review of Systems    Positives and Pertinent negatives as per HPI.     SURGICAL HISTORY   History reviewed. No pertinent surgical history.    CURRENTMEDICATIONS       Previous Medications    ACETAMINOPHEN (TYLENOL) 325 MG TABLET    Take 2 tablets by mouth every 6 hours as needed for Pain    BISACODYL (DULCOLAX) 10 MG SUPPOSITORY    Place  resuscitation)        [] No criteria met for Septic Shock.   Patient Vitals for the past 6 hrs:   BP Temp Pulse Resp SpO2 Height Weight Weight Method Percent Weight Change   04/11/24 0847 92/60 -- (!) 101 19 90 % -- -- -- --   04/11/24 0848 -- 99.2 °F (37.3 °C) -- -- -- 1.829 m (6') 83.8 kg (184 lb 11.9 oz) Actual;Bed scale 0   04/11/24 0900 120/61 -- 100 24 98 % -- -- -- --   04/11/24 0915 -- -- (!) 110 20 -- -- -- -- --   04/11/24 0930 118/62 -- (!) 108 25 94 % -- -- -- --   04/11/24 0945 -- -- (!) 104 22 91 % -- -- -- --   04/11/24 1000 (!) 98/52 -- (!) 108 22 92 % -- -- -- --   04/11/24 1015 (!) 89/76 -- (!) 107 25 92 % -- -- -- --   04/11/24 1030 (!) 82/55 -- (!) 104 23 -- -- -- -- --   04/11/24 1045 (!) 94/52 -- (!) 107 22 -- -- -- -- --   04/11/24 1100 (!) 91/55 -- 96 15 -- -- -- -- --   04/11/24 1115 -- -- (!) 106 20 91 % -- -- -- --   04/11/24 1138 105/67 -- (!) 107 21 -- -- -- -- --   04/11/24 1145 103/71 -- (!) 109 22 91 % -- -- -- --   04/11/24 1200 100/64 -- (!) 103 20 -- -- -- -- --   04/11/24 1215 112/87 -- (!) 106 20 91 % -- -- -- --   04/11/24 1310 (!) 91/35 -- (!) 106 21 -- -- -- -- --   04/11/24 1315 (!) 101/59 -- (!) 109 22 -- -- -- -- --      Recent Labs     04/11/24  0855 04/11/24  1135   WBC 6.6  --    CREATININE 5.8* 5.0*   BILITOT 1.0  --    PLT 64*  --          Time Septic Shock Identified: 10am    Fluid Resuscitation Rational: at least 30mL/kg based on entered actual weight at time of triage      Repeat lactate level: ordered and pending at this time    Reassessment Exam:   I have reassessed tissue perfusion and hemodynamic status after fluid bolus at this time: 1200        Chronic Conditions affecting care:    has a past medical history of Alcohol abuse, AV block, 1st degree, and Hypertension.    CONSULTS: (Who and What was discussed)  IP CONSULT TO NEPHROLOGY  See above    Records Reviewed (External and Source)     CC/HPI Summary, DDx, ED Course, and Reassessment:  83-year-old coming in

## 2024-04-11 NOTE — CONSULTS
Nephrology Consult Note   [facility]      Chief Complaint: confusion    History of Present Illness: Mr Kaufman is an 84 yo pleasantly confused male with a past medical history significant for HTN, SVT s/p ablation, hearing loss, dementia, tobacco abuse and a new diagnosis of metastatic prostate cancer (hospitalized on 24 at  with EVELINA secondary to obstruction) that presents to the ED at [facility] with confusion. Blood work done in the ED revealed worsening kidney function associated with Hyperkalemia (although blood sample was hemolyzed) - Nephrology consulted for recurring EVELINA  Rose placed in the ED and 1 L of urine immediately came out. Urine is cloudy. Patient remians confused/mumbling unable to provide history so history was obtained from ED physician and EPIC chart    Subjective:    In bed; NAD; confused    ROS: unobtainable due to patient being confused    Scheduled Meds:   insulin regular  5 Units IntraVENous Once    dextrose bolus  2 mL/kg IntraVENous Once    dextrose bolus  2 mL/kg IntraVENous Once        dextrose         PRN Meds:.lidocaine, glucose, dextrose bolus **OR** dextrose bolus, glucagon (rDNA), dextrose    Physical Exam:    TEMPERATURE:  Current - Temp: 99.2 °F (37.3 °C); Max - Temp  Av.2 °F (37.3 °C)  Min: 99.2 °F (37.3 °C)  Max: 99.2 °F (37.3 °C)  RESPIRATIONS RANGE: Resp  Av.5  Min: 15  Max: 25  PULSE RANGE: Pulse  Av.2  Min: 96  Max: 110  BLOOD PRESSURE RANGE:  Systolic (24hrs), Av , Min:82 , Max:120   ; Diastolic (24hrs), Av, Min:52, Max:76    24HR INTAKE/OUTPUT:  No intake or output data in the 24 hours ending 24 1205    No intake or output data in the 24 hours ending 24 1205    Patient Vitals for the past 96 hrs (Last 3 readings):   Weight   24 0848 83.8 kg (184 lb 11.9 oz)       General: Awake, confused  HEENT: Normocephalic, atraumatic  Eyes: EOMI, CHINYERE  Chest: clear to auscultation, no intercostal retractions  CVS: RRR, no murmur, no

## 2024-04-11 NOTE — ED PROVIDER NOTES
ED Attending Attestation Note    This patient was seen by the advanced practice provider.     I personally saw the patient and made/approved the management plan and take responsibility for the patient management.    History:   Briefly, 83 y.o. male presents with decreased urine output overnight.  Altered mental status.  Per the nursing home..       Physical Exam  Vitals and nursing note reviewed.   Constitutional:       General: He is in acute distress.      Appearance: He is ill-appearing.   HENT:      Head: Normocephalic and atraumatic.      Right Ear: External ear normal.      Left Ear: External ear normal.      Nose: Nose normal.   Eyes:      Extraocular Movements: Extraocular movements intact.      Conjunctiva/sclera: Conjunctivae normal.      Pupils: Pupils are equal, round, and reactive to light.   Cardiovascular:      Rate and Rhythm: Normal rate and regular rhythm.      Pulses: Normal pulses.      Heart sounds: Normal heart sounds. No murmur heard.     No gallop.      Comments: Radial pulses 2+, PT pulses 2+ bilateral  Pulmonary:      Effort: Pulmonary effort is normal. No respiratory distress.      Breath sounds: No wheezing or rales.   Abdominal:      General: There is distension.      Tenderness: There is abdominal tenderness.      Comments: Nonspecific abdominal tenderness palpation minimal suprapubic distention   Skin:     General: Skin is warm and dry.      Capillary Refill: Capillary refill takes less than 2 seconds.   Neurological:      Mental Status: He is alert.      GCS: GCS eye subscore is 4. GCS verbal subscore is 5. GCS motor subscore is 6.      Cranial Nerves: No facial asymmetry.      Comments: Alert and oriented to person only, moves all 4 extremities with good strength and normal sensation,       ED Course as of 04/11/24 1201   Thu Apr 11, 2024   1114 Talked with Dr Lyle who will see pt [KM]      ED Course User Index  [KM] Abhi Lovell, APRN - CNP       I independently interpreted

## 2024-04-11 NOTE — H&P
Hospital Medicine History & Physical      PCP: Luis Felipe Jackson MD    Date of Admission: 4/11/2024    Date of Service: Pt seen/examined on 04/11/24   and Admitted to Inpatient with expected LOS greater than two midnights due to medical therapy.   Chief Complaint:  AMS       History Of Present Illness:   83 y.o. male who presented to [facility] with a past medical history of prostate cancer, with bone metastasis, follows with urology, recently admitted with urine retention, had been on Lupron, hypertension, history of SVT s/p ablation, dementia (severity unclear), resides at [facility].  Patient presented to emergency with altered mental status,As per nursing home report patient had had worsening mental status and urinary retention for the past day, at the time of my exam patient is disoriented, only oriented to self, H&P was gathered from ED documentation and accompanied nursing home paperwork.    Patient is known to have metastatic prostate cancer followed by urology and oncology, was recently admitted with acute urine retention resulting in EVELINA at [facility] (2/29/2024).     Upon presentation patient was found to be in acute urine retention, 1 L drained after insertion of Rose's catheter, case was discussed with ED provider face-to-face, patient had already been seen by nephrology, no plans for dialysis.   Patient is full code as per documentation from nursing home.     Code status confirmed with son Steffi.   Full code.    Past Medical History:          Diagnosis Date    Alcohol abuse     AV block, 1st degree     Hypertension        Past Surgical History:      History reviewed. No pertinent surgical history.    Medications Prior to Admission:      Prior to Admission medications    Medication Sig Start Date End Date Taking? Authorizing Provider   bisacodyl (DULCOLAX) 10 MG suppository Place 1 suppository rectally daily as needed for Constipation   Yes Provider, MD Mikhail   calcium carbonate (OS-PRISCILLA) 1250 (500  contact the dictating provider for clarification.

## 2024-04-11 NOTE — PROGRESS NOTES
4 Eyes Skin Assessment     NAME:  Reji Kaufman  YOB: 1940  MEDICAL RECORD NUMBER:  [id number]    The patient is being assessed for  Admission    I agree that at least one RN has performed a thorough Head to Toe Skin Assessment on the patient. ALL assessment sites listed below have been assessed.      Areas assessed by both nurses:    Head, Face, Ears, Shoulders, Back, Chest, Arms, Elbows, Hands, Sacrum. Buttock, Coccyx, Ischium, and Legs. Feet and Heels        Does the Patient have a Wound? No noted wound(s); blanchable redness to buttocks       Sivakumar Prevention initiated by RN: Yes  Wound Care Orders initiated by RN: No    Pressure Injury (Stage 3,4, Unstageable, DTI, NWPT, and Complex wounds) if present, place Wound referral order by RN under : No    New Ostomies, if present place, Ostomy referral order under : No     Nurse 1 eSignature: Electronically signed by Mustapha Guzman RN on 4/11/24 at 6:20 PM EDT    **SHARE this note so that the co-signing nurse can place an eSignature**    Nurse 2 eSignature: Electronically signed by Mumtaz Damon RN on 4/11/24 at 6:37 PM EDT

## 2024-04-12 LAB
ANION GAP SERPL CALCULATED.3IONS-SCNC: 18 MMOL/L (ref 3–16)
BASOPHILS # BLD: 0 K/UL (ref 0–0.2)
BASOPHILS NFR BLD: 0 %
BUN SERPL-MCNC: 72 MG/DL (ref 7–20)
CALCIUM SERPL-MCNC: 8.3 MG/DL (ref 8.3–10.6)
CHLORIDE SERPL-SCNC: 104 MMOL/L (ref 99–110)
CO2 SERPL-SCNC: 19 MMOL/L (ref 21–32)
CREAT SERPL-MCNC: 4.3 MG/DL (ref 0.8–1.3)
DEPRECATED RDW RBC AUTO: 15.3 % (ref 12.4–15.4)
DOHLE BOD BLD QL SMEAR: PRESENT
EOSINOPHIL # BLD: 0 K/UL (ref 0–0.6)
EOSINOPHIL NFR BLD: 0 %
GFR SERPLBLD CREATININE-BSD FMLA CKD-EPI: 13 ML/MIN/{1.73_M2}
GLUCOSE BLD-MCNC: 102 MG/DL (ref 70–99)
GLUCOSE BLD-MCNC: 107 MG/DL (ref 70–99)
GLUCOSE BLD-MCNC: 34 MG/DL (ref 70–99)
GLUCOSE BLD-MCNC: 68 MG/DL (ref 70–99)
GLUCOSE BLD-MCNC: 70 MG/DL (ref 70–99)
GLUCOSE BLD-MCNC: 74 MG/DL (ref 70–99)
GLUCOSE BLD-MCNC: 91 MG/DL (ref 70–99)
GLUCOSE SERPL-MCNC: 59 MG/DL (ref 70–99)
HCT VFR BLD AUTO: 29.4 % (ref 40.5–52.5)
HGB BLD-MCNC: 9.5 G/DL (ref 13.5–17.5)
LYMPHOCYTES # BLD: 0.6 K/UL (ref 1–5.1)
LYMPHOCYTES NFR BLD: 3 %
MCH RBC QN AUTO: 26.2 PG (ref 26–34)
MCHC RBC AUTO-ENTMCNC: 32.3 G/DL (ref 31–36)
MCV RBC AUTO: 81 FL (ref 80–100)
MONOCYTES # BLD: 0.6 K/UL (ref 0–1.3)
MONOCYTES NFR BLD: 3 %
NEUTROPHILS # BLD: 17.8 K/UL (ref 1.7–7.7)
NEUTROPHILS NFR BLD: 85 %
NEUTS BAND NFR BLD MANUAL: 9 % (ref 0–7)
PERFORMED ON: ABNORMAL
PERFORMED ON: NORMAL
PLATELET # BLD AUTO: 42 K/UL (ref 135–450)
PMV BLD AUTO: 10.8 FL (ref 5–10.5)
POTASSIUM SERPL-SCNC: 4.7 MMOL/L (ref 3.5–5.1)
RBC # BLD AUTO: 3.62 M/UL (ref 4.2–5.9)
RBC MORPH BLD: NORMAL
SODIUM SERPL-SCNC: 141 MMOL/L (ref 136–145)
TOXIC GRANULES BLD QL SMEAR: PRESENT
WBC # BLD AUTO: 18.9 K/UL (ref 4–11)

## 2024-04-12 PROCEDURE — 2500000003 HC RX 250 WO HCPCS: Performed by: INTERNAL MEDICINE

## 2024-04-12 PROCEDURE — 87040 BLOOD CULTURE FOR BACTERIA: CPT

## 2024-04-12 PROCEDURE — 2580000003 HC RX 258: Performed by: INTERNAL MEDICINE

## 2024-04-12 PROCEDURE — 2580000003 HC RX 258: Performed by: STUDENT IN AN ORGANIZED HEALTH CARE EDUCATION/TRAINING PROGRAM

## 2024-04-12 PROCEDURE — 80048 BASIC METABOLIC PNL TOTAL CA: CPT

## 2024-04-12 PROCEDURE — 92610 EVALUATE SWALLOWING FUNCTION: CPT

## 2024-04-12 PROCEDURE — 97530 THERAPEUTIC ACTIVITIES: CPT

## 2024-04-12 PROCEDURE — 6360000002 HC RX W HCPCS: Performed by: STUDENT IN AN ORGANIZED HEALTH CARE EDUCATION/TRAINING PROGRAM

## 2024-04-12 PROCEDURE — 85025 COMPLETE CBC W/AUTO DIFF WBC: CPT

## 2024-04-12 PROCEDURE — 93005 ELECTROCARDIOGRAM TRACING: CPT | Performed by: STUDENT IN AN ORGANIZED HEALTH CARE EDUCATION/TRAINING PROGRAM

## 2024-04-12 PROCEDURE — 97162 PT EVAL MOD COMPLEX 30 MIN: CPT

## 2024-04-12 PROCEDURE — 2060000000 HC ICU INTERMEDIATE R&B

## 2024-04-12 PROCEDURE — 97166 OT EVAL MOD COMPLEX 45 MIN: CPT

## 2024-04-12 PROCEDURE — 36415 COLL VENOUS BLD VENIPUNCTURE: CPT

## 2024-04-12 RX ORDER — DEXTROSE AND SODIUM CHLORIDE 5; .45 G/100ML; G/100ML
INJECTION, SOLUTION INTRAVENOUS CONTINUOUS
Status: DISCONTINUED | OUTPATIENT
Start: 2024-04-12 | End: 2024-04-12

## 2024-04-12 RX ADMIN — DEXTROSE MONOHYDRATE 125 ML: 100 INJECTION, SOLUTION INTRAVENOUS at 07:34

## 2024-04-12 RX ADMIN — DEXTROSE AND SODIUM CHLORIDE: 5; 450 INJECTION, SOLUTION INTRAVENOUS at 13:33

## 2024-04-12 RX ADMIN — HEPARIN SODIUM 5000 UNITS: 5000 INJECTION INTRAVENOUS; SUBCUTANEOUS at 14:00

## 2024-04-12 RX ADMIN — CEFTRIAXONE SODIUM 2000 MG: 2 INJECTION, POWDER, FOR SOLUTION INTRAMUSCULAR; INTRAVENOUS at 09:42

## 2024-04-12 RX ADMIN — Medication: at 15:45

## 2024-04-12 RX ADMIN — DEXTROSE MONOHYDRATE 125 ML: 100 INJECTION, SOLUTION INTRAVENOUS at 13:21

## 2024-04-12 RX ADMIN — SODIUM CHLORIDE, POTASSIUM CHLORIDE, SODIUM LACTATE AND CALCIUM CHLORIDE: 600; 310; 30; 20 INJECTION, SOLUTION INTRAVENOUS at 07:39

## 2024-04-12 RX ADMIN — HEPARIN SODIUM 5000 UNITS: 5000 INJECTION INTRAVENOUS; SUBCUTANEOUS at 20:58

## 2024-04-12 RX ADMIN — AZITHROMYCIN MONOHYDRATE 500 MG: 500 INJECTION, POWDER, LYOPHILIZED, FOR SOLUTION INTRAVENOUS at 10:31

## 2024-04-12 ASSESSMENT — PAIN SCALES - WONG BAKER
WONGBAKER_NUMERICALRESPONSE: NO HURT
WONGBAKER_NUMERICALRESPONSE: NO HURT

## 2024-04-12 ASSESSMENT — PAIN SCALES - GENERAL
PAINLEVEL_OUTOF10: 0
PAINLEVEL_OUTOF10: 0

## 2024-04-12 NOTE — PLAN OF CARE
Problem: Safety - Adult  Goal: Free from fall injury  4/12/2024 0454 by Shell Vizcaino RN  Outcome: Progressing  4/11/2024 1834 by Mustapha Guzman RN  Outcome: Progressing     Problem: Discharge Planning  Goal: Discharge to home or other facility with appropriate resources  4/12/2024 0454 by Shell Vizcaino RN  Outcome: Progressing  4/11/2024 1834 by Mustapha Guzman RN  Outcome: Progressing     Problem: Pain  Goal: Verbalizes/displays adequate comfort level or baseline comfort level  4/12/2024 0454 by Shell Vizcaino RN  Outcome: Progressing  4/11/2024 1834 by Mustapha Guzman RN  Outcome: Progressing     Problem: Skin/Tissue Integrity  Goal: Absence of new skin breakdown  Description: 1.  Monitor for areas of redness and/or skin breakdown  2.  Assess vascular access sites hourly  3.  Every 4-6 hours minimum:  Change oxygen saturation probe site  4.  Every 4-6 hours:  If on nasal continuous positive airway pressure, respiratory therapy assess nares and determine need for appliance change or resting period.  4/12/2024 0454 by Shell Vizcaino RN  Outcome: Progressing  4/11/2024 1834 by Mustapha Guzman RN  Outcome: Progressing     Problem: ABCDS Injury Assessment  Goal: Absence of physical injury  4/12/2024 0454 by Shell Vizcaino RN  Outcome: Progressing  4/11/2024 1834 by Mustapha Guzman RN  Outcome: Progressing      no

## 2024-04-12 NOTE — PLAN OF CARE
Problem: Safety - Adult  Goal: Free from fall injury  4/12/2024 1023 by Mustapha Guzman RN  Outcome: Progressing  4/12/2024 0454 by Shell Vizcaino RN  Outcome: Progressing     Problem: Discharge Planning  Goal: Discharge to home or other facility with appropriate resources  4/12/2024 1023 by Mustapha Guzman RN  Outcome: Progressing  4/12/2024 0454 by Shell Vizcaino RN  Outcome: Progressing     Problem: Pain  Goal: Verbalizes/displays adequate comfort level or baseline comfort level  4/12/2024 1023 by Mustapha Guzman RN  Outcome: Progressing  4/12/2024 0454 by Shell Vizcaino RN  Outcome: Progressing     Problem: Skin/Tissue Integrity  Goal: Absence of new skin breakdown  Description: 1.  Monitor for areas of redness and/or skin breakdown  2.  Assess vascular access sites hourly  3.  Every 4-6 hours minimum:  Change oxygen saturation probe site  4.  Every 4-6 hours:  If on nasal continuous positive airway pressure, respiratory therapy assess nares and determine need for appliance change or resting period.  4/12/2024 1023 by Mustapha Guzman RN  Outcome: Progressing  4/12/2024 0454 by Shell Vizcaino RN  Outcome: Progressing     Problem: ABCDS Injury Assessment  Goal: Absence of physical injury  4/12/2024 1023 by Mustapha Guzman RN  Outcome: Progressing  4/12/2024 0454 by Shell Vizcaino RN  Outcome: Progressing

## 2024-04-12 NOTE — PROGRESS NOTES
Nephrology Progress Note   [email or web address]      Chief Complaint: confusion    History of Present Illness: Mr Kaufman is an 84 yo pleasantly confused male with a past medical history significant for HTN, SVT s/p ablation, hearing loss, dementia, tobacco abuse and a new diagnosis of metastatic prostate cancer (hospitalized on 24 at  with EVELINA secondary to obstruction) that presents to the ED at [facility] with confusion. Blood work done in the ED revealed worsening kidney function associated with Hyperkalemia (although blood sample was hemolyzed) - Nephrology consulted for recurring EVELINA  Rose placed in the ED and 1 L of urine immediately came out. Urine is cloudy. Patient remians confused/mumbling unable to provide history so history was obtained from ED physician and EPIC chart    Subjective:    In recliner; NAD    ROS: unobtainable due to patient factors but seems less confused than yesterday    Scheduled Meds:   cefTRIAXone (ROCEPHIN) IV  2,000 mg IntraVENous Q24H    And    azithromycin  500 mg IntraVENous Q24H    insulin regular  5 Units IntraVENous Once    dextrose bolus  2 mL/kg IntraVENous Once    dextrose bolus  2 mL/kg IntraVENous Once    finasteride  5 mg Oral Daily    sodium chloride flush  5-40 mL IntraVENous 2 times per day    heparin (porcine)  5,000 Units SubCUTAneous 3 times per day        dextrose 5 % and 0.45 % NaCl 100 mL/hr at 24 1333    dextrose      sodium chloride         PRN Meds:.lidocaine, glucose, dextrose bolus **OR** dextrose bolus, glucagon (rDNA), dextrose, sodium chloride flush, sodium chloride, ondansetron **OR** ondansetron, polyethylene glycol, acetaminophen **OR** acetaminophen    Physical Exam:    TEMPERATURE:  Current - Temp: 97.9 °F (36.6 °C); Max - Temp  Av.2 °F (36.8 °C)  Min: 97.6 °F (36.4 °C)  Max: 98.9 °F (37.2 °C)  RESPIRATIONS RANGE: Resp  Av.6  Min: 16  Max: 23  PULSE RANGE: Pulse  Av  Min: 81  Max: 106  BLOOD PRESSURE RANGE:  Systolic (24hrs),  Av , Min:99 , Max:127   ; Diastolic (24hrs), Av, Min:57, Max:75    24HR INTAKE/OUTPUT:    Intake/Output Summary (Last 24 hours) at 2024 1444  Last data filed at 2024 1425  Gross per 24 hour   Intake 1067.93 ml   Output 900 ml   Net 167.93 ml           Patient Vitals for the past 96 hrs (Last 3 readings):   Weight   24 0356 84.3 kg (185 lb 13.6 oz)   24 0848 83.8 kg (184 lb 11.9 oz)         General: Awake, confused  HEENT: Normocephalic, atraumatic  Eyes: EOMI, CHINYERE  Chest: clear to auscultation, no intercostal retractions  CVS: RRR, no murmur, no rub  Abdomen: soft, non tender, no organomegaly  Extremities: no edema, no cyanosis.  Skin: normal texture, normal skin turgor, no rash  Musculoskeletal: no joint swelling, no visible deformity  Neurological: moving all extremities  Psych: unable to assess  : corado in place; Cloudy urine    Allergies:  No Known Allergies   LAB DATA:    CBC:   Lab Results   Component Value Date/Time    WBC 18.9 2024 05:19 AM    RBC 3.62 2024 05:19 AM    HGB 9.5 2024 05:19 AM    HCT 29.4 2024 05:19 AM    MCV 81.0 2024 05:19 AM    MCH 26.2 2024 05:19 AM    MCHC 32.3 2024 05:19 AM    RDW 15.3 2024 05:19 AM    PLT 42 2024 05:19 AM    MPV 10.8 2024 05:19 AM     BMP:    Lab Results   Component Value Date/Time     2024 05:19 AM    K 4.7 2024 05:19 AM    K 5.8 2024 08:55 AM     2024 05:19 AM    CO2 19 2024 05:19 AM    BUN 72 2024 05:19 AM    CREATININE 4.3 2024 05:19 AM    CALCIUM 8.3 2024 05:19 AM    LABGLOM 13 2024 05:19 AM    GLUCOSE 59 2024 05:19 AM     Ionized Calcium:  No results found for: \"IONCA\"  Magnesium:  No results found for: \"MG\"  Phosphorus:    Lab Results   Component Value Date/Time    PHOS 4.3 2024 11:35 AM     U/A:    Lab Results   Component Value Date/Time    COLORU Yellow 2024 08:55 AM    PHUR 6.0

## 2024-04-12 NOTE — PROGRESS NOTES
V2.0    [facility] Progress Note      Name:  Reji Kaufman /Age/Sex: 1940  (83 y.o. male)   MRN & CSN:  [id number] & [id number] Encounter Date/Time: 2024 8:57 AM EDT   Location:  [id number] PCP: Luis Felipe Jackson MD     Attending:Veronica Tracy MD       Hospital Day: 2      HPI :   Chief Complaint: AMS.     Reji Kaufman is a 83 y.o. male who presents with  a past medical history of prostate cancer, with bone metastasis, follows with urology, recently admitted with urine retention, had been on Lupron, hypertension, history of SVT s/p ablation, dementia (severity unclear), resides at [facility].  Patient presented to emergency with altered mental status,As per nursing home report patient had had worsening mental status and urinary retention for the past day, at the time of my exam patient is disoriented, only oriented to self, H&P was gathered from ED documentation and accompanied nursing home paperwork.     Patient is known to have metastatic prostate cancer followed by urology and oncology, was recently admitted with acute urine retention resulting in EVELINA at [facility] (2024).      Upon presentation patient was found to be in acute urine retention, 1 L drained after insertion of Rose's catheter, case was discussed with ED provider face-to-face, patient had already been seen by nephrology, no plans for dialysis.   Patient is full code as per documentation from nursing home.      Code status confirmed with son Steffi.   Full code.  Subjective:   Hypoglycemia noted.   Patient continues to be confused, however more oriented today than yesterday      Review of Systems:      Pertinent positives and negatives discussed in HPI    Objective:     Intake/Output Summary (Last 24 hours) at 2024 1223  Last data filed at 2024 0357  Gross per 24 hour   Intake --   Output 450 ml   Net -450 ml      Vitals:   Vitals:    24 0345 24 0356 24 0719 24 1124   BP: 107/67  117/60 99/65  08:55 AM     Urine Cultures:   Lab Results   Component Value Date/Time    LABURIN >100,000 CFU/ml  Sensitivity to follow   04/11/2024 08:55 AM     Blood Cultures:   Lab Results   Component Value Date/Time    BC  04/11/2024 08:55 AM     No Growth to date.  Any change in status will be called.     Lab Results   Component Value Date/Time    BLOODCULT2  04/11/2024 09:54 AM     Gram stain Aerobic bottle:  Gram negative rods  Information to follow  Gram stain Anaerobic bottle:@Gram negative rods  Information to follow      BLOODCULT2 See additional report for complete BCID panel. 04/11/2024 09:54 AM     Organism:   Lab Results   Component Value Date/Time    ORG Escherichia coli DNA Detected 04/11/2024 09:54 AM         Assessment and Recommendations   Reji Kaufman is a 83 y.o. male who presents with AMS.       Acute renal failure.   Metastatic prostate cancer.   Hyperkalemia.   Sepsis   Bacteremia   Patient presented to emergency with altered mental status, had recently been admitted at [facility] in February due to EVELINA secondary to prostatomegaly and known prostate cancer, was recently seen by urology and oncology as outpatient.  Upon presentation patient was tachycardic up to 109, blood pressure on the softer side 101/59, Rose's catheter was inserted and 1 L of turbid urine was drained.  Lab work remarkable for lactic acid of 4.2, creatinine of 5.8, potassium initially 5.8, improved with treatment of 4.8, WBC 6.6, hemoglobin 10.0  Urine analysis with more than 100 WBC and large leukocyte esterase  CT abdomen done on presentation with no evidence of nephrolithiasis or obstructive uropathy however prostatomegaly was noted, no metastatic lesions.     Blood culture was positive for E. coli, sensitivity to follow  Plan.  -Appreciate nephrology input.  -Continue close clinical monitoring.  -BMP daily  -Follow urine and blood cultures  -Increase ceftriaxone to 2 g given bacteremia  - change fluids to D5 0.45 NS given

## 2024-04-12 NOTE — PROGRESS NOTES
Occupational Therapy  Facility/Department: [address] PROGRESSIVE CARE  Occupational Therapy Initial Assessment and Tentative D/C      Name: Reji Kaufman  : 1940  MRN: [id number]  Date of Service: 2024    Discharge Recommendations: Reji Kaufman scored a 15/24 on the AM-PAC ADL Inpatient form. Current research shows that an AM-PAC score of 17 or less is typically not associated with a discharge to the patient's home setting.     If patient discharges prior to next session this note will serve as a discharge summary.  Please see below for the latest assessment towards goals.     Long Term Care with OT, Long Term Care without OT  OT Equipment Recommendations  Equipment Needed: No       Patient Diagnosis(es): The primary encounter diagnosis was Septicemia (HCC). Diagnoses of Altered mental status, unspecified altered mental status type, Acute UTI, Acute renal failure, unspecified acute renal failure type (HCC), Hyperkalemia, Hypoglycemia, Sepsis with acute renal failure, due to unspecified organism, unspecified acute renal failure type, unspecified whether septic shock present (HCC), and Goals of care, counseling/discussion were also pertinent to this visit.  Past Medical History:  has a past medical history of Alcohol abuse, AV block, 1st degree, and Hypertension.  Past Surgical History:  has no past surgical history on file.           Assessment   Performance deficits / Impairments: Decreased functional mobility ;Decreased balance;Decreased ADL status;Decreased endurance;Decreased sensation;Decreased strength;Decreased cognition;Decreased high-level IADLs  Assessment: PTA pt from LTC. Pt is a poor historian. Pt currently requires Max A for bed mobility. Pt completes functional mobility and transfers with Min A and use of RW. Pt with no LOB. Pt needing cues for initation and managing RW. Anticipate pt needing up to Max A for ADLs based on ROM, strength, balance, and cognition. Pt with decreased cognition at    Education Given To: Patient  Education Provided: Role of Therapy;Transfer Training;Plan of Care  Education Method: Verbal;Demonstration  Barriers to Learning: Cognition  Education Outcome: Continued education needed             AM-PAC - ADL  AM-PAC Daily Activity - Inpatient   How much help is needed for putting on and taking off regular lower body clothing?: A Lot  How much help is needed for bathing (which includes washing, rinsing, drying)?: A Lot  How much help is needed for toileting (which includes using toilet, bedpan, or urinal)?: A Lot  How much help is needed for putting on and taking off regular upper body clothing?: A Little  How much help is needed for taking care of personal grooming?: A Little  How much help for eating meals?: A Little  AM-PAC Inpatient Daily Activity Raw Score: 15  AM-PAC Inpatient ADL T-Scale Score : 34.69  ADL Inpatient CMS 0-100% Score: 56.46  ADL Inpatient CMS G-Code Modifier : CK    Tinneti Score       Goals  Short Term Goals  Time Frame for Short Term Goals: prior to D/C  Short Term Goal 1: complete functional  mobility and transfers with CGA  Short Term Goal 2: complete bed mobility with CGA  Short Term Goal 3: complete toileting with CGA  Short Term Goal 4: complete bathing and dressing with Mod A  Short Term Goal 5: tolerate B UE exercises x10 reps for increased strength and endurance with ADLs  Long Term Goals  Time Frame for Long Term Goals : STG=LTG  Patient Goals   Patient goals : no stated goals       Therapy Time   Individual Concurrent Group Co-treatment   Time In 1054         Time Out 1117         Minutes 23         Timed Code Treatment Minutes: 8 Minutes (15 minute eval)       Milo Estrada OTR/L

## 2024-04-12 NOTE — PROGRESS NOTES
Physical Therapy  Facility/Department: [address] PROGRESSIVE CARE  Physical Therapy Initial Assessment    Name: Reji Kaufman  : 1940  MRN: [id number]  Date of Service: 2024    Discharge Recommendations:  Continue to assess pending progress (LTC with/without PT.)   PT Equipment Recommendations  Other: Defer to next level of care.      Current Am-Pac 15/24; anticipate return to LTC with/without PT.     Assessment   Body Structures, Functions, Activity Limitations Requiring Skilled Therapeutic Intervention: Decreased functional mobility ;Decreased strength;Decreased safe awareness;Decreased cognition;Decreased endurance;Decreased balance  Assessment: 82 y/o male admit 2024 from nursing facility with EVELINA, UTI, and Altered Mental Status. PMH as noted including Prostate Ca, A-Fib, AV Block, Dementia.  Pt admit from LTC setting; appears as if assist for daily care and functional mobility (with Walker) although limited info obtained (pt pleasantly confused).  Pt currently requiring Max assist to eob (diminished pt attempts to engage/assist), transfers/amb short distance within hospital room setting with Walker Min assist (cues for safe transitional mvts; limited carryover).  At this time, anticipate return to LTC setting with/without PT at discretion of facility.  Therapy Prognosis: Fair  Decision Making: Medium Complexity  History: 82 y/o male admit 2024 from nursing facility with EVELINA, UTI, and Altered Mental Status. PMH as noted including Prostate Ca, A-Fib, AV Block, Dementia.  Exam: See above.  Clinical Presentation: See above.  Barriers to Learning: Cognitive.  Requires PT Follow-Up: Yes  Activity Tolerance  Activity Tolerance: Patient tolerated treatment well;Treatment limited secondary to decreased cognition     Plan   Physical Therapy Plan  General Plan: 3-5 times per week  Current Treatment Recommendations: Strengthening, Therapeutic activities, Functional mobility training, Transfer training,  deficits  Initiation: Requires cues for all  Sequencing: Requires cues for all     Objective           Gross Assessment  AROM: Generally decreased, functional  Strength: Generally decreased, functional                 Bed mobility  Supine to Sit: Maximum assistance (HOB elevated.  Diminished pt attempts to engage/assist.)  Transfers  Sit to Stand: Minimal Assistance  Stand to Sit: Minimal Assistance  Comment: Transfer from eob, recliner with Walker : Min assist; cues for safe hand placement/positioning.  Ambulation  Surface: Level tile  Device: Rolling Walker  Distance: Pt amb 4-5 steps bed to chair, additional 25' with Walker Min assist.  No LE buckling/giving way.  Cues for safe transitional mvts (limited carryover).                   AM-PAC - Mobility    AM-PAC Basic Mobility - Inpatient   How much help is needed turning from your back to your side while in a flat bed without using bedrails?: A Little  How much help is needed moving from lying on your back to sitting on the side of a flat bed without using bedrails?: A Lot  How much help is needed moving to and from a bed to a chair?: A Little  How much help is needed standing up from a chair using your arms?: A Little  How much help is needed walking in hospital room?: A Little  How much help is needed climbing 3-5 steps with a railing?: Total  AM-PAC Inpatient Mobility Raw Score : 15  AM-PAC Inpatient T-Scale Score : 39.45  Mobility Inpatient CMS 0-100% Score: 57.7  Mobility Inpatient CMS G-Code Modifier : CK         Goals  Short Term Goals  Time Frame for Short Term Goals: Upon d/c acute care setting.  Short Term Goal 1: Bed Mob SBA.  Short Term Goal 2: Transfers with assist device CGA.  Short Term Goal 3: Amb with assist device 50-75' CGA.  Short Term Goal 4: Pt participating in approp Strength Exs.  Patient Goals   Patient Goals : None stated.       Education  Patient Education  Education Given To: Patient  Education Provided Comments: Role of PT, POC, Need

## 2024-04-12 NOTE — PROGRESS NOTES
Clearing-Delayed:   []Cough-Immediate:   []Cough-Delayed:  []Change in Vital Signs:  [x]Suspected Delayed Pharyngeal Clearing:  [x]Other: repeat swallows and increasing work of breathing with PO      EDUCATION:   Provided education regarding role of SLP, results of assessment, recommendations and general speech pathology plan of care.   [] Pt verbalized understanding and agreement   [x] Pt requires ongoing learning   [] No evidence of comprehension       If patient discharges prior to next visit, this note will serve as discharge.     Timed Code Minutes: 0  Total Treatment Minutes: 36    Electronically signed by:    Stefanie Rascon MA, CCC-SLP, #[id number]  Speech-Language Pathologist  Portable phone: [phone]  04/12/24 11:47 AM

## 2024-04-12 NOTE — PROGRESS NOTES
Pt appeared to go into afib from sinus rhythm. MD notified, EKG obtained. Rate is currently controlled.

## 2024-04-13 LAB
ALBUMIN SERPL-MCNC: 2.5 G/DL (ref 3.4–5)
ANION GAP SERPL CALCULATED.3IONS-SCNC: 13 MMOL/L (ref 3–16)
BACTERIA UR CULT: ABNORMAL
BASOPHILS # BLD: 0 K/UL (ref 0–0.2)
BASOPHILS NFR BLD: 0 %
BUN SERPL-MCNC: 65 MG/DL (ref 7–20)
CALCIUM SERPL-MCNC: 7.8 MG/DL (ref 8.3–10.6)
CHLORIDE SERPL-SCNC: 105 MMOL/L (ref 99–110)
CO2 SERPL-SCNC: 23 MMOL/L (ref 21–32)
CREAT SERPL-MCNC: 2.9 MG/DL (ref 0.8–1.3)
DEPRECATED RDW RBC AUTO: 14.9 % (ref 12.4–15.4)
DOHLE BOD BLD QL SMEAR: PRESENT
EKG DIAGNOSIS: NORMAL
EKG Q-T INTERVAL: 338 MS
EKG QRS DURATION: 74 MS
EKG QTC CALCULATION (BAZETT): 424 MS
EKG R AXIS: 21 DEGREES
EKG T AXIS: 32 DEGREES
EKG VENTRICULAR RATE: 95 BPM
EOSINOPHIL # BLD: 0 K/UL (ref 0–0.6)
EOSINOPHIL NFR BLD: 0 %
GFR SERPLBLD CREATININE-BSD FMLA CKD-EPI: 21 ML/MIN/{1.73_M2}
GLUCOSE BLD-MCNC: 100 MG/DL (ref 70–99)
GLUCOSE BLD-MCNC: 105 MG/DL (ref 70–99)
GLUCOSE BLD-MCNC: 109 MG/DL (ref 70–99)
GLUCOSE BLD-MCNC: 115 MG/DL (ref 70–99)
GLUCOSE BLD-MCNC: 116 MG/DL (ref 70–99)
GLUCOSE BLD-MCNC: 129 MG/DL (ref 70–99)
GLUCOSE SERPL-MCNC: 119 MG/DL (ref 70–99)
HCT VFR BLD AUTO: 26.5 % (ref 40.5–52.5)
HGB BLD-MCNC: 8.5 G/DL (ref 13.5–17.5)
LACTATE BLDV-SCNC: 1.7 MMOL/L (ref 0.4–2)
LYMPHOCYTES # BLD: 1.1 K/UL (ref 1–5.1)
LYMPHOCYTES NFR BLD: 5 %
MCH RBC QN AUTO: 25.5 PG (ref 26–34)
MCHC RBC AUTO-ENTMCNC: 32.2 G/DL (ref 31–36)
MCV RBC AUTO: 79.3 FL (ref 80–100)
MONOCYTES # BLD: 0.7 K/UL (ref 0–1.3)
MONOCYTES NFR BLD: 3 %
NEUTROPHILS # BLD: 20.4 K/UL (ref 1.7–7.7)
NEUTROPHILS NFR BLD: 90 %
NEUTS BAND NFR BLD MANUAL: 2 % (ref 0–7)
ORGANISM: ABNORMAL
PERFORMED ON: ABNORMAL
PHOSPHATE SERPL-MCNC: 3.5 MG/DL (ref 2.5–4.9)
PLATELET # BLD AUTO: 33 K/UL (ref 135–450)
PLATELET BLD QL SMEAR: ABNORMAL
PMV BLD AUTO: 10.7 FL (ref 5–10.5)
POTASSIUM SERPL-SCNC: 3.9 MMOL/L (ref 3.5–5.1)
RBC # BLD AUTO: 3.34 M/UL (ref 4.2–5.9)
RBC MORPH BLD: NORMAL
SLIDE REVIEW: ABNORMAL
SODIUM SERPL-SCNC: 141 MMOL/L (ref 136–145)
TOXIC GRANULES BLD QL SMEAR: PRESENT
WBC # BLD AUTO: 22.2 K/UL (ref 4–11)

## 2024-04-13 PROCEDURE — 6370000000 HC RX 637 (ALT 250 FOR IP): Performed by: STUDENT IN AN ORGANIZED HEALTH CARE EDUCATION/TRAINING PROGRAM

## 2024-04-13 PROCEDURE — 36415 COLL VENOUS BLD VENIPUNCTURE: CPT

## 2024-04-13 PROCEDURE — 80069 RENAL FUNCTION PANEL: CPT

## 2024-04-13 PROCEDURE — 2500000003 HC RX 250 WO HCPCS: Performed by: INTERNAL MEDICINE

## 2024-04-13 PROCEDURE — 2060000000 HC ICU INTERMEDIATE R&B

## 2024-04-13 PROCEDURE — 93010 ELECTROCARDIOGRAM REPORT: CPT | Performed by: INTERNAL MEDICINE

## 2024-04-13 PROCEDURE — 83605 ASSAY OF LACTIC ACID: CPT

## 2024-04-13 PROCEDURE — 6360000002 HC RX W HCPCS: Performed by: STUDENT IN AN ORGANIZED HEALTH CARE EDUCATION/TRAINING PROGRAM

## 2024-04-13 PROCEDURE — 85025 COMPLETE CBC W/AUTO DIFF WBC: CPT

## 2024-04-13 PROCEDURE — 2700000000 HC OXYGEN THERAPY PER DAY

## 2024-04-13 PROCEDURE — 94761 N-INVAS EAR/PLS OXIMETRY MLT: CPT

## 2024-04-13 PROCEDURE — 2580000003 HC RX 258: Performed by: STUDENT IN AN ORGANIZED HEALTH CARE EDUCATION/TRAINING PROGRAM

## 2024-04-13 PROCEDURE — 92526 ORAL FUNCTION THERAPY: CPT

## 2024-04-13 PROCEDURE — 2580000003 HC RX 258: Performed by: INTERNAL MEDICINE

## 2024-04-13 RX ADMIN — AZITHROMYCIN MONOHYDRATE 500 MG: 500 INJECTION, POWDER, LYOPHILIZED, FOR SOLUTION INTRAVENOUS at 10:59

## 2024-04-13 RX ADMIN — Medication: at 05:42

## 2024-04-13 RX ADMIN — HEPARIN SODIUM 5000 UNITS: 5000 INJECTION INTRAVENOUS; SUBCUTANEOUS at 05:40

## 2024-04-13 RX ADMIN — Medication: at 18:01

## 2024-04-13 RX ADMIN — HEPARIN SODIUM 5000 UNITS: 5000 INJECTION INTRAVENOUS; SUBCUTANEOUS at 15:10

## 2024-04-13 RX ADMIN — Medication: at 01:08

## 2024-04-13 RX ADMIN — HEPARIN SODIUM 5000 UNITS: 5000 INJECTION INTRAVENOUS; SUBCUTANEOUS at 20:08

## 2024-04-13 RX ADMIN — CEFTRIAXONE SODIUM 2000 MG: 2 INJECTION, POWDER, FOR SOLUTION INTRAMUSCULAR; INTRAVENOUS at 09:51

## 2024-04-13 ASSESSMENT — PAIN SCALES - GENERAL: PAINLEVEL_OUTOF10: 0

## 2024-04-13 NOTE — PROGRESS NOTES
Dysphagia tx: persistent concern for poor pharyngeal clearance with aspiration risk with PO; rec continued NPO and consider MBS for continued assessment  Dysphagia ex: NA  Training in compensatory strategies: will need reinforcement pending recs  Pt response to ex/training: patient agreeable and cooperative; concern for reduced insight and recall    Goals:    Pt will functionally tolerate ongoing assessment of swallow function with diet to be determined as indicated rec MBS    Assessment:   Impressions:   Patient alert, cooperative, and pleasant; oriented to self; verbally responsive but poor historian; follows dx.  Improved oral hygiene since last seen 4/12/2024.  Clinically, patient appears to present with oropharyngeal dysphagia features characterized by decreased lingual manipulation, delayed swallow, and decreased laryngeal elevation; textured solids and mastication not assessed d/t concern for severity of pharyngeal imps. Patient with prolonged oral transit with all trials. Suspect premature bolus loss to the pharynx with all. Persistent concern for potential for reduced pharyngeal peristalsis based on assessment via digital palpation with concern for high risk for reduced pharyngeal clearance. Weak cough with ice chip and nectar thick via tsp. Concern for repeat swallows and delayed cough with all presentations. Slight change in work of breathing following PO trials not as notable/significant as 4/12/2024.  Recommend to continue NPO pending ongoing re-assessment via MBS d/t persistent concern for penetration/aspiration risk with PO.  Verbal order received from MD. CONTRERAS to be completed 4/13/2024 when xray is next available for MBS.    Recommended Diet and Intervention 4/13/2024:  Solids: NPO;   Liquids: NPO;  Meds:  meds most optimal if non-oral; if PO meds are critical/essential, rec meds in puree/pudding crushed vs whole     Compensatory Swallowing Strategies:  Aspiration Precautions     Diet order

## 2024-04-13 NOTE — PROGRESS NOTES
Nephrology Progress Note   [email or web address]      Chief Complaint: confusion    History of Present Illness: Mr Kaufman is an 84 yo pleasantly confused male with a past medical history significant for HTN, SVT s/p ablation, hearing loss, dementia, tobacco abuse and a new diagnosis of metastatic prostate cancer (hospitalized on 24 at  with EVELINA secondary to obstruction) that presents to the ED at [facility] with confusion. Blood work done in the ED revealed worsening kidney function associated with Hyperkalemia (although blood sample was hemolyzed) - Nephrology consulted for recurring EVELINA  Rose placed in the ED and 1 L of urine immediately came out. Urine is cloudy. Patient remians confused/mumbling unable to provide history so history was obtained from ED physician and EPIC chart    Subjective:    In bed; NAD    ROS: unobtainable due to patient factors but seems less confused than yesterday    Scheduled Meds:   cefTRIAXone (ROCEPHIN) IV  2,000 mg IntraVENous Q24H    metoprolol tartrate  25 mg Oral BID    dextrose bolus  2 mL/kg IntraVENous Once    dextrose bolus  2 mL/kg IntraVENous Once    finasteride  5 mg Oral Daily    sodium chloride flush  5-40 mL IntraVENous 2 times per day    heparin (porcine)  5,000 Units SubCUTAneous 3 times per day        sodium bicarbonate 75 mEq in dextrose 5 % and 0.45 % NaCl 1,000 mL infusion 100 mL/hr at 24 0542    dextrose      sodium chloride         PRN Meds:.lidocaine, glucose, dextrose bolus **OR** dextrose bolus, glucagon (rDNA), dextrose, sodium chloride flush, sodium chloride, ondansetron **OR** ondansetron, polyethylene glycol, acetaminophen **OR** acetaminophen    Physical Exam:    TEMPERATURE:  Current - Temp: 98.1 °F (36.7 °C); Max - Temp  Av.2 °F (36.8 °C)  Min: 97.6 °F (36.4 °C)  Max: 98.6 °F (37 °C)  RESPIRATIONS RANGE: Resp  Av.5  Min: 15  Max: 23  PULSE RANGE: Pulse  Av.8  Min: 89  Max: 105  BLOOD PRESSURE RANGE:  Systolic (24hrs), Av ,  Min:101 , Max:120   ; Diastolic (24hrs), Av, Min:53, Max:68    24HR INTAKE/OUTPUT:    Intake/Output Summary (Last 24 hours) at 2024 1600  Last data filed at 2024 1358  Gross per 24 hour   Intake --   Output 1325 ml   Net -1325 ml             Patient Vitals for the past 96 hrs (Last 3 readings):   Weight   24 0415 82.4 kg (181 lb 10.5 oz)   24 0356 84.3 kg (185 lb 13.6 oz)   24 0848 83.8 kg (184 lb 11.9 oz)         General: Awake, confused  HEENT: Normocephalic, atraumatic  Eyes: EOMI, CHINYERE  Chest: clear to auscultation, no intercostal retractions  CVS: RRR, no murmur, no rub  Abdomen: soft, non tender, no organomegaly  Extremities: no edema, no cyanosis.  Skin: normal texture, normal skin turgor, no rash  Musculoskeletal: no joint swelling, no visible deformity  Neurological: moving all extremities  Psych: unable to assess  : corado in place; Cloudy urine    Allergies:  No Known Allergies   LAB DATA:    CBC:   Lab Results   Component Value Date/Time    WBC 22.2 2024 05:50 AM    RBC 3.34 2024 05:50 AM    HGB 8.5 2024 05:50 AM    HCT 26.5 2024 05:50 AM    MCV 79.3 2024 05:50 AM    MCH 25.5 2024 05:50 AM    MCHC 32.2 2024 05:50 AM    RDW 14.9 2024 05:50 AM    PLT 33 2024 05:50 AM    MPV 10.7 2024 05:50 AM     BMP:    Lab Results   Component Value Date/Time     2024 05:50 AM    K 3.9 2024 05:50 AM    K 5.8 2024 08:55 AM     2024 05:50 AM    CO2 23 2024 05:50 AM    BUN 65 2024 05:50 AM    CREATININE 2.9 2024 05:50 AM    CALCIUM 7.8 2024 05:50 AM    LABGLOM 21 2024 05:50 AM    GLUCOSE 119 2024 05:50 AM     Ionized Calcium:  No results found for: \"IONCA\"  Magnesium:  No results found for: \"MG\"  Phosphorus:    Lab Results   Component Value Date/Time    PHOS 3.5 2024 05:50 AM     U/A:    Lab Results   Component Value Date/Time    COLORU Yellow 2024

## 2024-04-13 NOTE — PROGRESS NOTES
V2.0    [facility] Progress Note      Name:  Reji Kaufman /Age/Sex: 1940  (83 y.o. male)   MRN & CSN:  [id number] & [id number] Encounter Date/Time: 2024 8:57 AM EDT   Location:  [id number] PCP: Luis Felipe Jackson MD     Attending:Veronica Tracy MD       Hospital Day: 3      HPI :   Chief Complaint: AMS.     Reji Kaufman is a 83 y.o. male who presents with  a past medical history of prostate cancer, with bone metastasis, follows with urology, recently admitted with urine retention, had been on Lupron, hypertension, history of SVT s/p ablation, dementia (severity unclear), resides at [facility].  Patient presented to emergency with altered mental status,As per nursing home report patient had had worsening mental status and urinary retention for the past day, at the time of my exam patient is disoriented, only oriented to self, H&P was gathered from ED documentation and accompanied nursing home paperwork.     Patient is known to have metastatic prostate cancer followed by urology and oncology, was recently admitted with acute urine retention resulting in EVELINA at [facility] (2024).      Upon presentation patient was found to be in acute urine retention, 1 L drained after insertion of Rose's catheter, case was discussed with ED provider face-to-face, patient had already been seen by nephrology, no plans for dialysis.   Patient is full code as per documentation from nursing home.      Code status confirmed with son Steffi.   Full code.  Subjective:   Continues to be confused, but able to speak more clearly today.   Denies any complaints.   Urine output 900 ml in the last 24 hours/     Review of Systems:      Pertinent positives and negatives discussed in HPI    Objective:     Intake/Output Summary (Last 24 hours) at 2024 1447  Last data filed at 2024 1358  Gross per 24 hour   Intake --   Output 1325 ml   Net -1325 ml        Vitals:   Vitals:    24 0945 24 1145 24 1200 24  hypoglycemia protocol     Dysphagia  Discussed with SLP, ELIOT Monday      Lactic acidosis   Repeat to normalization.      Suspected CAP.   Noted on CXR , will add azithromycin to ceftriaxone.      Acute metabolic encephalopathy.  Dementia.  Worsening altered mental status on top of background dementia likely due to above.  I will continue to observe, CT head unremarkable  Seems less agitated , continues to be confused.      Hypertension .   Hold home medications given hypotension.         Chronic anemia.  Likely due to chronic disease.  Will check iron studies     Social  Son contacted and updated 4/11.   Full code.       Diet Diet NPO     DVT Prophylaxis [] Lovenox, []  Heparin, [] SCDs, [] Ambulation,  [] Eliquis, [] Xarelto  [] Coumadin   Code Status Full Code   Disposition From: ECF   Expected Disposition: ECF   Estimated Date of Discharge: 2-3 days   Patient requires continued admission due to bacteremia    Surrogate Decision Maker/ POA  Son      Personally reviewed Lab Studies and Imaging        Medical Decision Making:  The following items were considered in medical decision making:  Discussion of patient care with other providers  Reviewed clinical lab tests  Reviewed radiology tests  Reviewed other diagnostic tests/interventions  Independent review of radiologic images  Microbiology cultures and other micro tests reviewed        Electronically signed by Veronica Tracy MD on 4/13/2024 at 2:47 PM  Comment: Please note this report has been produced using speech recognition software and may contain errors related to that system including errors in grammar, punctuation, and spelling, as well as words and phrases that may be inappropriate. If there are any questions or concerns, please feel free to contact the dictating provider for clarification.

## 2024-04-13 NOTE — PLAN OF CARE
Problem: Safety - Adult  Goal: Free from fall injury  4/12/2024 2237 by Jaye Byrd RN  Outcome: Progressing  4/12/2024 1023 by Mustapha Guzman RN  Outcome: Progressing     Problem: Discharge Planning  Goal: Discharge to home or other facility with appropriate resources  4/12/2024 2237 by Jaye Byrd RN  Outcome: Progressing  4/12/2024 1023 by Mustapha Guzman RN  Outcome: Progressing     Problem: Pain  Goal: Verbalizes/displays adequate comfort level or baseline comfort level  4/12/2024 2237 by Jaye Byrd RN  Outcome: Progressing  4/12/2024 1023 by Mustapha Guzman RN  Outcome: Progressing     Problem: Skin/Tissue Integrity  Goal: Absence of new skin breakdown  Description: 1.  Monitor for areas of redness and/or skin breakdown  2.  Assess vascular access sites hourly  3.  Every 4-6 hours minimum:  Change oxygen saturation probe site  4.  Every 4-6 hours:  If on nasal continuous positive airway pressure, respiratory therapy assess nares and determine need for appliance change or resting period.  4/12/2024 2237 by Jaye Byrd RN  Outcome: Progressing  4/12/2024 1023 by Mustapha Guzman RN  Outcome: Progressing     Problem: ABCDS Injury Assessment  Goal: Absence of physical injury  4/12/2024 2237 by Jaye Byrd RN  Outcome: Progressing  4/12/2024 1023 by Mustapha Guzman RN  Outcome: Progressing     Problem: Confusion  Goal: Confusion, delirium, dementia, or psychosis is improved or at baseline  Description: INTERVENTIONS:  1. Assess for possible contributors to thought disturbance, including medications, impaired vision or hearing, underlying metabolic abnormalities, dehydration, psychiatric diagnoses, and notify attending LIP  2. [place] high risk fall precautions, as indicated  3. Provide frequent short contacts to provide reality reorientation, refocusing and direction  4. Decrease environmental stimuli, including noise as appropriate  5. Monitor and intervene to maintain adequate

## 2024-04-14 LAB
ALBUMIN SERPL-MCNC: 2.5 G/DL (ref 3.4–5)
ANION GAP SERPL CALCULATED.3IONS-SCNC: 7 MMOL/L (ref 3–16)
BACTERIA BLD CULT ORG #2: ABNORMAL
BACTERIA BLD CULT ORG #2: ABNORMAL
BASOPHILS # BLD: 0 K/UL (ref 0–0.2)
BASOPHILS NFR BLD: 0 %
BUN SERPL-MCNC: 61 MG/DL (ref 7–20)
CALCIUM SERPL-MCNC: 8.1 MG/DL (ref 8.3–10.6)
CHLORIDE SERPL-SCNC: 110 MMOL/L (ref 99–110)
CO2 SERPL-SCNC: 30 MMOL/L (ref 21–32)
CREAT SERPL-MCNC: 2.3 MG/DL (ref 0.8–1.3)
DEPRECATED RDW RBC AUTO: 14.9 % (ref 12.4–15.4)
EOSINOPHIL # BLD: 0 K/UL (ref 0–0.6)
EOSINOPHIL NFR BLD: 0 %
GFR SERPLBLD CREATININE-BSD FMLA CKD-EPI: 27 ML/MIN/{1.73_M2}
GLUCOSE BLD-MCNC: 117 MG/DL (ref 70–99)
GLUCOSE BLD-MCNC: 118 MG/DL (ref 70–99)
GLUCOSE BLD-MCNC: 118 MG/DL (ref 70–99)
GLUCOSE BLD-MCNC: 128 MG/DL (ref 70–99)
GLUCOSE BLD-MCNC: 136 MG/DL (ref 70–99)
GLUCOSE BLD-MCNC: 145 MG/DL (ref 70–99)
GLUCOSE SERPL-MCNC: 119 MG/DL (ref 70–99)
HCT VFR BLD AUTO: 27.5 % (ref 40.5–52.5)
HGB BLD-MCNC: 8.9 G/DL (ref 13.5–17.5)
HYPOCHROMIA BLD QL SMEAR: ABNORMAL
LYMPHOCYTES # BLD: 0.5 K/UL (ref 1–5.1)
LYMPHOCYTES NFR BLD: 3 %
MCH RBC QN AUTO: 25.4 PG (ref 26–34)
MCHC RBC AUTO-ENTMCNC: 32.3 G/DL (ref 31–36)
MCV RBC AUTO: 78.5 FL (ref 80–100)
MONOCYTES # BLD: 1 K/UL (ref 0–1.3)
MONOCYTES NFR BLD: 6 %
NEUTROPHILS # BLD: 15.4 K/UL (ref 1.7–7.7)
NEUTROPHILS NFR BLD: 91 %
ORGANISM: ABNORMAL
ORGANISM: ABNORMAL
PERFORMED ON: ABNORMAL
PHOSPHATE SERPL-MCNC: 2.8 MG/DL (ref 2.5–4.9)
PLATELET # BLD AUTO: 33 K/UL (ref 135–450)
PLATELET BLD QL SMEAR: ABNORMAL
PMV BLD AUTO: 12 FL (ref 5–10.5)
POTASSIUM SERPL-SCNC: 3.7 MMOL/L (ref 3.5–5.1)
RBC # BLD AUTO: 3.51 M/UL (ref 4.2–5.9)
SLIDE REVIEW: ABNORMAL
SODIUM SERPL-SCNC: 147 MMOL/L (ref 136–145)
WBC # BLD AUTO: 16.9 K/UL (ref 4–11)

## 2024-04-14 PROCEDURE — 94760 N-INVAS EAR/PLS OXIMETRY 1: CPT

## 2024-04-14 PROCEDURE — 85025 COMPLETE CBC W/AUTO DIFF WBC: CPT

## 2024-04-14 PROCEDURE — 6360000002 HC RX W HCPCS: Performed by: STUDENT IN AN ORGANIZED HEALTH CARE EDUCATION/TRAINING PROGRAM

## 2024-04-14 PROCEDURE — 2500000003 HC RX 250 WO HCPCS: Performed by: INTERNAL MEDICINE

## 2024-04-14 PROCEDURE — 80069 RENAL FUNCTION PANEL: CPT

## 2024-04-14 PROCEDURE — 2060000000 HC ICU INTERMEDIATE R&B

## 2024-04-14 PROCEDURE — 2580000003 HC RX 258: Performed by: STUDENT IN AN ORGANIZED HEALTH CARE EDUCATION/TRAINING PROGRAM

## 2024-04-14 PROCEDURE — 2580000003 HC RX 258: Performed by: INTERNAL MEDICINE

## 2024-04-14 PROCEDURE — 36415 COLL VENOUS BLD VENIPUNCTURE: CPT

## 2024-04-14 RX ORDER — DEXTROSE MONOHYDRATE 50 MG/ML
INJECTION, SOLUTION INTRAVENOUS CONTINUOUS
Status: DISCONTINUED | OUTPATIENT
Start: 2024-04-14 | End: 2024-04-15

## 2024-04-14 RX ADMIN — HEPARIN SODIUM 5000 UNITS: 5000 INJECTION INTRAVENOUS; SUBCUTANEOUS at 14:09

## 2024-04-14 RX ADMIN — DEXTROSE MONOHYDRATE: 50 INJECTION, SOLUTION INTRAVENOUS at 16:19

## 2024-04-14 RX ADMIN — HEPARIN SODIUM 5000 UNITS: 5000 INJECTION INTRAVENOUS; SUBCUTANEOUS at 22:10

## 2024-04-14 RX ADMIN — Medication 10 ML: at 09:51

## 2024-04-14 RX ADMIN — Medication: at 05:10

## 2024-04-14 RX ADMIN — DEXTROSE MONOHYDRATE: 50 INJECTION, SOLUTION INTRAVENOUS at 13:17

## 2024-04-14 RX ADMIN — CEFTRIAXONE SODIUM 2000 MG: 2 INJECTION, POWDER, FOR SOLUTION INTRAMUSCULAR; INTRAVENOUS at 09:50

## 2024-04-14 RX ADMIN — HEPARIN SODIUM 5000 UNITS: 5000 INJECTION INTRAVENOUS; SUBCUTANEOUS at 05:09

## 2024-04-14 NOTE — PROGRESS NOTES
V2.0    [facility] [place]      Name:  Reji Kaufman /Age/Sex: 1940  (83 y.o. male)   MRN & CSN:  [id number] & [id number] Encounter Date/Time: 2024 8:57 AM EDT   Location:  [id number] PCP: Luis Felipe Jackson MD     Attending:Veronica Tracy MD       Hospital Day: 4      HPI :   Chief Complaint: AMS.     Reji Kaufman is a 83 y.o. male who presents with  a past medical history of prostate cancer, with bone metastasis, follows with urology, recently admitted with urine retention, had been on Lupron, hypertension, history of SVT s/p ablation, dementia (severity unclear), resides at [facility].  Patient presented to emergency with altered mental status,As per nursing home report patient had had worsening mental status and urinary retention for the past day, at the time of my exam patient is disoriented, only oriented to self, H&P was gathered from ED documentation and accompanied nursing home paperwork.     Patient is known to have metastatic prostate cancer followed by urology and oncology, was recently admitted with acute urine retention resulting in EVELINA at [facility] (2024).      Upon presentation patient was found to be in acute urine retention, 1 L drained after insertion of Rose's catheter, case was discussed with ED provider face-to-face, patient had already been seen by nephrology, no plans for dialysis.   Patient is full code as per documentation from nursing home.      Code status confirmed with son Steffi.   Full code.  Subjective:   Pleasantly confused, denies any complaints.   Output 1.3 liters in the last 24 hours.   Review of Systems:      Pertinent positives and negatives discussed in HPI    Objective:     Intake/Output Summary (Last 24 hours) at 2024 1306  Last data filed at 2024 1256  Gross per 24 hour   Intake --   Output 1875 ml   Net -1875 ml        Vitals:   Vitals:    24 0000 24 0416 24 0746 24 1233   BP: (!) 109/54 (!) 129/58 138/61 134/61  AM     Urine Cultures:   Lab Results   Component Value Date/Time    LABURIN >100,000 CFU/ml 04/11/2024 08:55 AM     Blood Cultures:   Lab Results   Component Value Date/Time    BC  04/12/2024 08:19 AM     No Growth to date.  Any change in status will be called.     Lab Results   Component Value Date/Time    BLOODCULT2  04/12/2024 08:19 AM     No Growth to date.  Any change in status will be called.     Organism:   Lab Results   Component Value Date/Time    ORG Escherichia coli DNA Detected 04/11/2024 09:54 AM    ORG Escherichia coli 04/11/2024 09:54 AM         Assessment and Recommendations   Reji Kaufman is a 83 y.o. male who presents with AMS.       Acute renal failure.   Metastatic prostate cancer.   Hyperkalemia.   Sepsis   Bacteremia   Patient presented to emergency with altered mental status, had recently been admitted at [facility] in February due to EVELINA secondary to prostatomegaly and known prostate cancer, was recently seen by urology and oncology as outpatient.  Upon presentation patient was tachycardic up to 109, blood pressure on the softer side 101/59, Rose's catheter was inserted and 1 L of turbid urine was drained.  Lab work remarkable for lactic acid of 4.2, creatinine of 5.8, potassium initially 5.8, improved with treatment of 4.8, WBC 6.6, hemoglobin 10.0  Urine analysis with more than 100 WBC and large leukocyte esterase  CT abdomen done on presentation with no evidence of nephrolithiasis or obstructive uropathy however prostatomegaly was noted, no metastatic lesions.     Blood culture was positive for E. coli, sensitivite to ceftriaxone   Cr improving 4.2--> 2.9--> 2.3 , K 4.7--> 3.9-->3.7  Plan.  -Appreciate nephrology input.  -Continue close clinical monitoring.  -BMP daily  -repeated blood cultures continue to be negative  -continue on increased  ceftriaxone to 2 g given bacteremia  - IVF changed to D5   - intake and output     Hypernatremia   147 , started on D5 4/12    Hypoglycemia  In the

## 2024-04-14 NOTE — PLAN OF CARE
Problem: Safety - Adult  Goal: Free from fall injury  Outcome: Progressing     Problem: Discharge Planning  Goal: Discharge to home or other facility with appropriate resources  Outcome: Progressing     Problem: Pain  Goal: Verbalizes/displays adequate comfort level or baseline comfort level  Outcome: Progressing     Problem: Skin/Tissue Integrity  Goal: Absence of new skin breakdown  Description: 1.  Monitor for areas of redness and/or skin breakdown  2.  Assess vascular access sites hourly  3.  Every 4-6 hours minimum:  Change oxygen saturation probe site  4.  Every 4-6 hours:  If on nasal continuous positive airway pressure, respiratory therapy assess nares and determine need for appliance change or resting period.  Outcome: Progressing     Problem: ABCDS Injury Assessment  Goal: Absence of physical injury  Outcome: Progressing     Problem: Confusion  Goal: Confusion, delirium, dementia, or psychosis is improved or at baseline  Description: INTERVENTIONS:  1. Assess for possible contributors to thought disturbance, including medications, impaired vision or hearing, underlying metabolic abnormalities, dehydration, psychiatric diagnoses, and notify attending LIP  2. [place] high risk fall precautions, as indicated  3. Provide frequent short contacts to provide reality reorientation, refocusing and direction  4. Decrease environmental stimuli, including noise as appropriate  5. Monitor and intervene to maintain adequate nutrition, hydration, elimination, sleep and activity  6. If unable to ensure safety without constant attention obtain sitter and review sitter guidelines with assigned personnel  7. Initiate Psychosocial CNS and Spiritual Care consult, as indicated  Outcome: Progressing

## 2024-04-14 NOTE — PROGRESS NOTES
Intake/Output Summary (Last 24 hours) at 4/14/2024 1248  Last data filed at 4/14/2024 0042  Gross per 24 hour   Intake --   Output 950 ml   Net -950 ml             Patient Vitals for the past 96 hrs (Last 3 readings):   Weight   04/14/24 0416 82.6 kg (182 lb 1.6 oz)   04/13/24 0415 82.4 kg (181 lb 10.5 oz)   04/12/24 0356 84.3 kg (185 lb 13.6 oz)         General: Awake, confused  HEENT: Normocephalic, atraumatic  Eyes: EOMI, CHINYERE  Chest: clear to auscultation, no intercostal retractions  CVS: RRR, no murmur, no rub  Abdomen: soft, non tender, no organomegaly  Extremities: no edema, no cyanosis.  Skin: normal texture, normal skin turgor, no rash  Musculoskeletal: no joint swelling, no visible deformity  Neurological: moving all extremities  Psych: unable to assess  : corado in place    Allergies:  No Known Allergies   LAB DATA:    CBC:   Lab Results   Component Value Date/Time    WBC 16.9 04/14/2024 04:51 AM    RBC 3.51 04/14/2024 04:51 AM    HGB 8.9 04/14/2024 04:51 AM    HCT 27.5 04/14/2024 04:51 AM    MCV 78.5 04/14/2024 04:51 AM    MCH 25.4 04/14/2024 04:51 AM    MCHC 32.3 04/14/2024 04:51 AM    RDW 14.9 04/14/2024 04:51 AM    PLT 33 04/14/2024 04:51 AM    MPV 12.0 04/14/2024 04:51 AM     BMP:    Lab Results   Component Value Date/Time     04/14/2024 04:51 AM    K 3.7 04/14/2024 04:51 AM    K 5.8 04/11/2024 08:55 AM     04/14/2024 04:51 AM    CO2 30 04/14/2024 04:51 AM    BUN 61 04/14/2024 04:51 AM    CREATININE 2.3 04/14/2024 04:51 AM    CALCIUM 8.1 04/14/2024 04:51 AM    LABGLOM 27 04/14/2024 04:51 AM    GLUCOSE 119 04/14/2024 04:51 AM     Ionized Calcium:  No results found for: \"IONCA\"  Magnesium:  No results found for: \"MG\"  Phosphorus:    Lab Results   Component Value Date/Time    PHOS 2.8 04/14/2024 04:51 AM     U/A:    Lab Results   Component Value Date/Time    COLORU Yellow 04/11/2024 08:55 AM    PHUR 6.0 04/11/2024 08:55 AM    WBCUA >100 04/11/2024 08:55 AM    RBCUA 11-20 04/11/2024 08:55

## 2024-04-14 NOTE — PLAN OF CARE
Problem: Safety - Adult  Goal: Free from fall injury  4/14/2024 0826 by Jean Beal RN  Outcome: Progressing  Flowsheets (Taken 4/14/2024 0826)  Free From Fall Injury: Instruct family/caregiver on patient safety  4/13/2024 2058 by Jaye Byrd RN  Outcome: Progressing     Problem: Discharge Planning  Goal: Discharge to home or other facility with appropriate resources  4/14/2024 0826 by Jean Beal RN  Outcome: Progressing  Flowsheets (Taken 4/14/2024 0826)  Discharge to home or other facility with appropriate resources:   Identify barriers to discharge with patient and caregiver   Arrange for needed discharge resources and transportation as appropriate   Identify discharge learning needs (meds, wound care, etc)  4/13/2024 2058 by Jaye Byrd RN  Outcome: Progressing     Problem: Pain  Goal: Verbalizes/displays adequate comfort level or baseline comfort level  4/14/2024 0826 by Jean Beal RN  Outcome: Progressing  Flowsheets (Taken 4/14/2024 0826)  Verbalizes/displays adequate comfort level or baseline comfort level:   Assess pain using appropriate pain scale   Encourage patient to monitor pain and request assistance   Administer analgesics based on type and severity of pain and evaluate response   Implement non-pharmacological measures as appropriate and evaluate response  4/13/2024 2058 by Jaye Byrd RN  Outcome: Progressing     Problem: Skin/Tissue Integrity  Goal: Absence of new skin breakdown  Description: 1.  Monitor for areas of redness and/or skin breakdown  2.  Assess vascular access sites hourly  3.  Every 4-6 hours minimum:  Change oxygen saturation probe site  4.  Every 4-6 hours:  If on nasal continuous positive airway pressure, respiratory therapy assess nares and determine need for appliance change or resting period.  4/14/2024 0826 by Jean Beal RN  Outcome: Progressing  4/13/2024 2058 by Jaye Byrd RN  Outcome: Progressing     Problem:

## 2024-04-15 ENCOUNTER — APPOINTMENT (OUTPATIENT)
Dept: GENERAL RADIOLOGY | Age: 84
End: 2024-04-15
Payer: MEDICARE

## 2024-04-15 LAB
ALBUMIN SERPL-MCNC: 2.4 G/DL (ref 3.4–5)
ANION GAP SERPL CALCULATED.3IONS-SCNC: 8 MMOL/L (ref 3–16)
ANISOCYTOSIS BLD QL SMEAR: ABNORMAL
BACTERIA BLD CULT: NORMAL
BASOPHILS # BLD: 0 K/UL (ref 0–0.2)
BASOPHILS NFR BLD: 0 %
BUN SERPL-MCNC: 45 MG/DL (ref 7–20)
CALCIUM SERPL-MCNC: 8.3 MG/DL (ref 8.3–10.6)
CHLORIDE SERPL-SCNC: 113 MMOL/L (ref 99–110)
CO2 SERPL-SCNC: 29 MMOL/L (ref 21–32)
CREAT SERPL-MCNC: 1.9 MG/DL (ref 0.8–1.3)
DEPRECATED RDW RBC AUTO: 14.9 % (ref 12.4–15.4)
DOHLE BOD BLD QL SMEAR: PRESENT
EOSINOPHIL # BLD: 0 K/UL (ref 0–0.6)
EOSINOPHIL NFR BLD: 0 %
GFR SERPLBLD CREATININE-BSD FMLA CKD-EPI: 34 ML/MIN/{1.73_M2}
GLUCOSE BLD-MCNC: 115 MG/DL (ref 70–99)
GLUCOSE BLD-MCNC: 119 MG/DL (ref 70–99)
GLUCOSE BLD-MCNC: 121 MG/DL (ref 70–99)
GLUCOSE BLD-MCNC: 122 MG/DL (ref 70–99)
GLUCOSE BLD-MCNC: 134 MG/DL (ref 70–99)
GLUCOSE BLD-MCNC: 364 MG/DL (ref 70–99)
GLUCOSE SERPL-MCNC: 103 MG/DL (ref 70–99)
HCT VFR BLD AUTO: 28.3 % (ref 40.5–52.5)
HGB BLD-MCNC: 9.2 G/DL (ref 13.5–17.5)
HYPOCHROMIA BLD QL SMEAR: ABNORMAL
LYMPHOCYTES # BLD: 0.8 K/UL (ref 1–5.1)
LYMPHOCYTES NFR BLD: 6 %
MCH RBC QN AUTO: 25.6 PG (ref 26–34)
MCHC RBC AUTO-ENTMCNC: 32.5 G/DL (ref 31–36)
MCV RBC AUTO: 78.8 FL (ref 80–100)
MONOCYTES # BLD: 0.4 K/UL (ref 0–1.3)
MONOCYTES NFR BLD: 3 %
NEUTROPHILS # BLD: 12.3 K/UL (ref 1.7–7.7)
NEUTROPHILS NFR BLD: 90 %
NEUTS BAND NFR BLD MANUAL: 1 % (ref 0–7)
PERFORMED ON: ABNORMAL
PHOSPHATE SERPL-MCNC: 3.1 MG/DL (ref 2.5–4.9)
PLATELET # BLD AUTO: 42 K/UL (ref 135–450)
PLATELET BLD QL SMEAR: ABNORMAL
PMV BLD AUTO: 10.9 FL (ref 5–10.5)
POTASSIUM SERPL-SCNC: 3.9 MMOL/L (ref 3.5–5.1)
RBC # BLD AUTO: 3.59 M/UL (ref 4.2–5.9)
SLIDE REVIEW: ABNORMAL
SODIUM SERPL-SCNC: 150 MMOL/L (ref 136–145)
SODIUM SERPL-SCNC: 151 MMOL/L (ref 136–145)
STOMATOCYTES BLD QL SMEAR: ABNORMAL
TOXIC GRANULES BLD QL SMEAR: PRESENT
WBC # BLD AUTO: 13.5 K/UL (ref 4–11)

## 2024-04-15 PROCEDURE — 84295 ASSAY OF SERUM SODIUM: CPT

## 2024-04-15 PROCEDURE — 6360000002 HC RX W HCPCS: Performed by: STUDENT IN AN ORGANIZED HEALTH CARE EDUCATION/TRAINING PROGRAM

## 2024-04-15 PROCEDURE — 80069 RENAL FUNCTION PANEL: CPT

## 2024-04-15 PROCEDURE — 74230 X-RAY XM SWLNG FUNCJ C+: CPT

## 2024-04-15 PROCEDURE — 85025 COMPLETE CBC W/AUTO DIFF WBC: CPT

## 2024-04-15 PROCEDURE — 6370000000 HC RX 637 (ALT 250 FOR IP): Performed by: STUDENT IN AN ORGANIZED HEALTH CARE EDUCATION/TRAINING PROGRAM

## 2024-04-15 PROCEDURE — 2580000003 HC RX 258: Performed by: STUDENT IN AN ORGANIZED HEALTH CARE EDUCATION/TRAINING PROGRAM

## 2024-04-15 PROCEDURE — 36415 COLL VENOUS BLD VENIPUNCTURE: CPT

## 2024-04-15 PROCEDURE — 2060000000 HC ICU INTERMEDIATE R&B

## 2024-04-15 PROCEDURE — 2580000003 HC RX 258: Performed by: INTERNAL MEDICINE

## 2024-04-15 PROCEDURE — 94760 N-INVAS EAR/PLS OXIMETRY 1: CPT

## 2024-04-15 PROCEDURE — 92611 MOTION FLUOROSCOPY/SWALLOW: CPT

## 2024-04-15 PROCEDURE — 97129 THER IVNTJ 1ST 15 MIN: CPT

## 2024-04-15 RX ADMIN — CEFTRIAXONE SODIUM 2000 MG: 2 INJECTION, POWDER, FOR SOLUTION INTRAMUSCULAR; INTRAVENOUS at 09:48

## 2024-04-15 RX ADMIN — METOPROLOL TARTRATE 25 MG: 25 TABLET, FILM COATED ORAL at 21:22

## 2024-04-15 RX ADMIN — HEPARIN SODIUM 5000 UNITS: 5000 INJECTION INTRAVENOUS; SUBCUTANEOUS at 21:21

## 2024-04-15 RX ADMIN — DEXTROSE MONOHYDRATE: 50 INJECTION, SOLUTION INTRAVENOUS at 06:22

## 2024-04-15 RX ADMIN — HEPARIN SODIUM 5000 UNITS: 5000 INJECTION INTRAVENOUS; SUBCUTANEOUS at 14:41

## 2024-04-15 RX ADMIN — HEPARIN SODIUM 5000 UNITS: 5000 INJECTION INTRAVENOUS; SUBCUTANEOUS at 06:18

## 2024-04-15 RX ADMIN — METOPROLOL TARTRATE 25 MG: 25 TABLET, FILM COATED ORAL at 08:42

## 2024-04-15 RX ADMIN — FINASTERIDE 5 MG: 5 TABLET, FILM COATED ORAL at 08:42

## 2024-04-15 RX ADMIN — DEXTROSE MONOHYDRATE: 50 INJECTION, SOLUTION INTRAVENOUS at 16:59

## 2024-04-15 ASSESSMENT — PAIN SCALES - WONG BAKER: WONGBAKER_NUMERICALRESPONSE: NO HURT

## 2024-04-15 ASSESSMENT — PAIN SCALES - GENERAL: PAINLEVEL_OUTOF10: 0

## 2024-04-15 NOTE — CARE COORDINATION
Case Management Assessment  Initial Evaluation    Date/Time of Evaluation: 4/15/2024 12:32 PM  Assessment Completed by: NAY Méndez    If patient is discharged prior to next notation, then this note serves as note for discharge by case management.    Patient Name: Reji Kaufman                   YOB: 1940  Diagnosis: Hyperkalemia [E87.5]  Hypoglycemia [E16.2]  Septicemia (HCC) [A41.9]  Acute UTI [N39.0]  EVELINA (acute kidney injury) (HCC) [N17.9]  Goals of care, counseling/discussion [Z71.89]  Acute renal failure, unspecified acute renal failure type (HCC) [N17.9]  Altered mental status, unspecified altered mental status type [R41.82]  Sepsis with acute renal failure, due to unspecified organism, unspecified acute renal failure type, unspecified whether septic shock present (HCC) [A41.9, R65.20, N17.9]                   Date / Time: 4/11/2024  8:38 AM    Patient Admission Status: Inpatient   Readmission Risk (Low < 19, Mod (19-27), High > 27): Readmission Risk Score: 18.3    Current PCP: Luis Felipe Jackson MD  PCP verified by CM? (P) Yes    Chart Reviewed: Yes      History Provided by: (P) Patient  Patient Orientation: (P) Alert and Oriented    Patient Cognition: (P) Alert    Hospitalization in the last 30 days (Readmission):  No    If yes, Readmission Assessment in CM Navigator will be completed.    Advance Directives:      Code Status: Full Code   Patient's Primary Decision Maker is: (P) Legal Next of Kin      Discharge Planning:    Patient lives with: (P) Other (Comment) (ECF) Type of Home: (P) Skilled Nursing Facility  Primary Care Giver: (P) Other (Comment) (ECF)  Patient Support Systems include: (P) Children   Current Financial resources: (P) Medicaid, Medicare  Current community resources: (P) ECF/Home Care  Current services prior to admission: (P) Extended Care Placement            Current DME:              Type of Home Care services:  (P) None    ADLS  Prior functional level: (P)  Assistance with the following:, Bathing, Cooking, Housework, Shopping  Current functional level: (P) Assistance with the following:, Bathing, Cooking, Housework, Shopping    PT AM-PAC: 15 /24  OT AM-PAC: 15 /24    Family can provide assistance at DC: (P) No  Would you like Case Management to discuss the discharge plan with any other family members/significant others, and if so, who? (P) No  Plans to Return to Present Housing: (P) Yes  Other Identified Issues/Barriers to RETURNING to current housing: none   Potential Assistance needed at discharge: (P) Skilled Nursing Facility            Potential DME:    Patient expects to discharge to: (P) Skilled nursing facility  Plan for transportation at discharge: (P) Family    Financial    Payor: MEDICARE / Plan: MEDICARE PART A AND B / Product Type: *No Product type* /     Does insurance require precert for SNF: Yes    Potential assistance Purchasing Medications: (P) No  Meds-to-Beds request:      No Pharmacies Listed    Notes:    Factors facilitating achievement of predicted outcomes: Family support, Friend support, Motivated, Cooperative, Pleasant, and Knowledge about rehab    Barriers to discharge: Limited family support and Medication managment    Additional Case Management Notes: Met with patient at bedside.   ACP completed.   Patient plans to return to [facility]     Discharging to Facility/ Agency   Name: [facility]  Address:  [address] [place] [address]   Phone:  [phone]  Called to Kylah in admissions to verify level of care and what is needed for patient's return to facility. Await return phone call to confirm.       The Plan for Transition of Care is related to the following treatment goals of Hyperkalemia [E87.5]  Hypoglycemia [E16.2]  Septicemia (HCC) [A41.9]  Acute UTI [N39.0]  EVELINA (acute kidney injury) ([facility]) [N17.9]  Goals of care, counseling/discussion [Z71.89]  Acute renal failure, unspecified acute renal failure type ([facility])

## 2024-04-15 NOTE — PROGRESS NOTES
Nephrology Progress Note   [email or web address]      Chief Complaint: confusion    History of Present Illness: Mr Kaufman is an 84 yo pleasantly confused male with a past medical history significant for HTN, SVT s/p ablation, hearing loss, dementia, tobacco abuse and a new diagnosis of metastatic prostate cancer (hospitalized on 24 at  with EVELINA secondary to obstruction) that presents to the ED at [facility] with confusion. Blood work done in the ED revealed worsening kidney function associated with Hyperkalemia (although blood sample was hemolyzed) - Nephrology consulted for recurring EVELINA  Rose placed in the ED and 1 L of urine immediately came out. Urine is cloudy. Patient remians confused/mumbling unable to provide history so history was obtained from ED physician and EPIC chart    Subjective:    In bed; NAD; mumbling    ROS: unobtainable due to patient factors     Scheduled Meds:   cefTRIAXone (ROCEPHIN) IV  2,000 mg IntraVENous Q24H    metoprolol tartrate  25 mg Oral BID    dextrose bolus  2 mL/kg IntraVENous Once    dextrose bolus  2 mL/kg IntraVENous Once    finasteride  5 mg Oral Daily    sodium chloride flush  5-40 mL IntraVENous 2 times per day    heparin (porcine)  5,000 Units SubCUTAneous 3 times per day        dextrose 100 mL/hr at 04/15/24 0622    dextrose      sodium chloride         PRN Meds:.lidocaine, glucose, dextrose bolus **OR** dextrose bolus, glucagon (rDNA), dextrose, sodium chloride flush, sodium chloride, ondansetron **OR** ondansetron, polyethylene glycol, acetaminophen **OR** acetaminophen    Physical Exam:    TEMPERATURE:  Current - Temp: 99 °F (37.2 °C); Max - Temp  Av.9 °F (37.2 °C)  Min: 98.6 °F (37 °C)  Max: 99.2 °F (37.3 °C)  RESPIRATIONS RANGE: Resp  Av  Min: 16  Max: 23  PULSE RANGE: Pulse  Av.7  Min: 57  Max: 85  BLOOD PRESSURE RANGE:  Systolic (24hrs), Av , Min:118 , Max:156   ; Diastolic (24hrs), Av, Min:58, Max:67    24HR INTAKE/OUTPUT:     Intake/Output Summary (Last 24 hours) at 4/15/2024 1244  Last data filed at 4/15/2024 1018  Gross per 24 hour   Intake 492 ml   Output 2625 ml   Net -2133 ml           Patient Vitals for the past 96 hrs (Last 3 readings):   Weight   04/15/24 0515 85.8 kg (189 lb 2.5 oz)   04/14/24 0416 82.6 kg (182 lb 1.6 oz)   04/13/24 0415 82.4 kg (181 lb 10.5 oz)       General: Awake, confused  HEENT: Normocephalic, atraumatic  Eyes: EOMI, CHINYERE  Chest: clear to auscultation, no intercostal retractions  CVS: RRR, no murmur, no rub  Abdomen: soft, non tender, no organomegaly  Extremities: no edema, no cyanosis.  Skin: normal texture, normal skin turgor, no rash  Musculoskeletal: no joint swelling, no visible deformity  : corado in place    Allergies:  No Known Allergies   LAB DATA:    CBC:   Lab Results   Component Value Date/Time    WBC 13.5 04/15/2024 05:18 AM    RBC 3.59 04/15/2024 05:18 AM    HGB 9.2 04/15/2024 05:18 AM    HCT 28.3 04/15/2024 05:18 AM    MCV 78.8 04/15/2024 05:18 AM    MCH 25.6 04/15/2024 05:18 AM    MCHC 32.5 04/15/2024 05:18 AM    RDW 14.9 04/15/2024 05:18 AM    PLT 42 04/15/2024 05:18 AM    MPV 10.9 04/15/2024 05:18 AM     BMP:    Lab Results   Component Value Date/Time     04/15/2024 05:18 AM    K 3.9 04/15/2024 05:18 AM    K 5.8 04/11/2024 08:55 AM     04/15/2024 05:18 AM    CO2 29 04/15/2024 05:18 AM    BUN 45 04/15/2024 05:18 AM    CREATININE 1.9 04/15/2024 05:18 AM    CALCIUM 8.3 04/15/2024 05:18 AM    LABGLOM 34 04/15/2024 05:18 AM    GLUCOSE 103 04/15/2024 05:18 AM     Ionized Calcium:  No results found for: \"IONCA\"  Magnesium:  No results found for: \"MG\"  Phosphorus:    Lab Results   Component Value Date/Time    PHOS 3.1 04/15/2024 05:18 AM     U/A:    Lab Results   Component Value Date/Time    COLORU Yellow 04/11/2024 08:55 AM    PHUR 6.0 04/11/2024 08:55 AM    WBCUA >100 04/11/2024 08:55 AM    RBCUA 11-20 04/11/2024 08:55 AM    BACTERIA 4+ 04/11/2024 08:55 AM    CLARITYU CLOUDY

## 2024-04-15 NOTE — PLAN OF CARE
Problem: Skin/Tissue Integrity  Goal: Absence of new skin breakdown  Description: 1.  Monitor for areas of redness and/or skin breakdown  2.  Assess vascular access sites hourly  3.  Every 4-6 hours minimum:  Change oxygen saturation probe site  4.  Every 4-6 hours:  If on nasal continuous positive airway pressure, respiratory therapy assess nares and determine need for appliance change or resting period.  4/15/2024 1021 by Diamond Cohen, RN  Outcome: Progressing     Problem: Pain  Goal: Verbalizes/displays adequate comfort level or baseline comfort level  4/15/2024 1021 by Diamond Cohen, RN  Outcome: Progressing

## 2024-04-15 NOTE — PLAN OF CARE
Problem: Safety - Adult  Goal: Free from fall injury  Outcome: Progressing     Problem: Discharge Planning  Goal: Discharge to home or other facility with appropriate resources  Outcome: Progressing     Problem: Pain  Goal: Verbalizes/displays adequate comfort level or baseline comfort level  Outcome: Progressing     Problem: Skin/Tissue Integrity  Goal: Absence of new skin breakdown  Description: 1.  Monitor for areas of redness and/or skin breakdown  2.  Assess vascular access sites hourly  3.  Every 4-6 hours minimum:  Change oxygen saturation probe site  4.  Every 4-6 hours:  If on nasal continuous positive airway pressure, respiratory therapy assess nares and determine need for appliance change or resting period.  Outcome: Progressing     Problem: ABCDS Injury Assessment  Goal: Absence of physical injury  Outcome: Progressing     Problem: Confusion  Goal: Confusion, delirium, dementia, or psychosis is improved or at baseline  Description: INTERVENTIONS:  1. Assess for possible contributors to thought disturbance, including medications, impaired vision or hearing, underlying metabolic abnormalities, dehydration, psychiatric diagnoses, and notify attending LIP  2. [place] high risk fall precautions, as indicated  3. Provide frequent short contacts to provide reality reorientation, refocusing and direction  4. Decrease environmental stimuli, including noise as appropriate  5. Monitor and intervene to maintain adequate nutrition, hydration, elimination, sleep and activity  6. If unable to ensure safety without constant attention obtain sitter and review sitter guidelines with assigned personnel  7. Initiate Psychosocial CNS and Spiritual Care consult, as indicated  Outcome: Progressing

## 2024-04-15 NOTE — PROCEDURES
INSTRUMENTAL SWALLOW REPORT  MODIFIED BARIUM SWALLOW    NAME: Reji Kaufman   : 1940  MRN: [id number]       Date of Eval: 4/15/2024     Ordering Physician: Demarcus  Radiologist: Juan    Referring Diagnosis: oropharyngeal dysphagia; r 13.12    Past Medical History:  has a past medical history of Alcohol abuse, AV block, 1st degree, and Hypertension.  Past Surgical History:  has no past surgical history on file.       CHART REVIEW:  2024 admitted with c/o AMS  MD ADMISSION H&P HPI:  83 y.o. male who presented to [facility] with a past medical history of prostate cancer, with bone metastasis, follows with urology, recently admitted with urine retention, had been on Lupron, hypertension, history of SVT s/p ablation, dementia (severity unclear), resides at [facility].  Patient presented to emergency with altered mental status,As per nursing home report patient had had worsening mental status and urinary retention for the past day, at the time of my exam patient is disoriented, only oriented to self, H&P was gathered from ED documentation and accompanied nursing home paperwork.  Patient is known to have metastatic prostate cancer followed by urology and oncology, was recently admitted with acute urine retention resulting in EVELINA at [facility] (2024).   Upon presentation patient was found to be in acute urine retention, 1 L drained after insertion of Rose's catheter, case was discussed with ED provider face-to-face, patient had already been seen by nephrology, no plans for dialysis.   Patient is full code as per documentation from nursing home.      Nephrology consult and notes reviewed  2024 swallow eval ordered; swallow eval rec NPO  2024 SLP rec to continue NPO pending MBS     IMAGING:  CXR: 2024  IMPRESSION:  Airspace change in the left lower lung zone which may represent atelectasis or a small pleural effusion.  Developing pneumonitis cannot be excluded.     CT HEAD:

## 2024-04-15 NOTE — PROGRESS NOTES
V2.0    [facility] Progress Note      Name:  Reji Kaufman /Age/Sex: 1940  (83 y.o. male)   MRN & CSN:  [id number] & [id number] Encounter Date/Time: 4/15/2024 8:57 AM EDT   Location:  [id number] PCP: Luis Felipe Jackson MD     Attending:Veronica Tracy MD       Hospital Day: 5      HPI :   Chief Complaint: AMS.     Reji Kaufman is a 83 y.o. male who presents with  a past medical history of prostate cancer, with bone metastasis, follows with urology, recently admitted with urine retention, had been on Lupron, hypertension, history of SVT s/p ablation, dementia (severity unclear), resides at [facility].  Patient presented to emergency with altered mental status,As per nursing home report patient had had worsening mental status and urinary retention for the past day, at the time of my exam patient is disoriented, only oriented to self, H&P was gathered from ED documentation and accompanied nursing home paperwork.     Patient is known to have metastatic prostate cancer followed by urology and oncology, was recently admitted with acute urine retention resulting in EVELINA at [facility] (2024).      Upon presentation patient was found to be in acute urine retention, 1 L drained after insertion of Rose's catheter, case was discussed with ED provider face-to-face, patient had already been seen by nephrology, no plans for dialysis.   Patient is full code as per documentation from nursing home.      Code status confirmed with son Steffi.   Full code.  Subjective:   Pleasantly confused, denies any complaints.   Output 1.8 liters in the last 24 hours.   Review of Systems:      Pertinent positives and negatives discussed in HPI    Objective:     Intake/Output Summary (Last 24 hours) at 4/15/2024 1141  Last data filed at 4/15/2024 1018  Gross per 24 hour   Intake 492 ml   Output 2625 ml   Net -2133 ml        Vitals:   Vitals:    04/15/24 0859 04/15/24 0900 04/15/24 1000 04/15/24 1100   BP:       Pulse: 81 78 57 65   Resp:  AM     Blood Cultures:   Lab Results   Component Value Date/Time    BC  04/12/2024 08:19 AM     No Growth to date.  Any change in status will be called.     Lab Results   Component Value Date/Time    BLOODCULT2  04/12/2024 08:19 AM     No Growth to date.  Any change in status will be called.     Organism:   Lab Results   Component Value Date/Time    ORG Escherichia coli DNA Detected 04/11/2024 09:54 AM    ORG Escherichia coli 04/11/2024 09:54 AM         Assessment and Recommendations   Reji Kaufman is a 83 y.o. male who presents with AMS.       Acute renal failure.   Metastatic prostate cancer.   Hyperkalemia.   Sepsis   Bacteremia   Patient presented to emergency with altered mental status, had recently been admitted at [facility] in February due to EVELINA secondary to prostatomegaly and known prostate cancer, was recently seen by urology and oncology as outpatient.  Upon presentation patient was tachycardic up to 109, blood pressure on the softer side 101/59, Rose's catheter was inserted and 1 L of turbid urine was drained.  Lab work remarkable for lactic acid of 4.2, creatinine of 5.8, potassium initially 5.8, improved with treatment of 4.8, WBC 6.6, hemoglobin 10.0  Urine analysis with more than 100 WBC and large leukocyte esterase  CT abdomen done on presentation with no evidence of nephrolithiasis or obstructive uropathy however prostatomegaly was noted, no metastatic lesions.     Blood culture was positive for E. coli, sensitivite to ceftriaxone   Cr improving 4.2--> 2.9--> 2.3--> 1.9   K 4.7--> 3.9 ( continues to be normal )   Plan.  -Appreciate nephrology input.  -Continue close clinical monitoring.  -BMP daily  -repeated blood cultures continue to be negative  -continue on increased  ceftriaxone to 2 g given bacteremia ( started 4/11)   - IVF changed to D5   - intake and output     Hypernatremia   147--> 150 , started on D5 4/12    Hypoglycemia  Improved     Dysphagia  MBS 4/15.   Started on modified diet

## 2024-04-15 NOTE — ACP (ADVANCE CARE PLANNING)
Advance Care Planning     Advance Care Planning Activator (Inpatient)  Conversation Note      Date of ACP Conversation: 4/15/2024     Conversation Conducted with: Patient with Decision Making Capacity    ACP Activator: NAY Méndez    Health Care Decision Maker:     Current Designated Health Care Decision Maker:     Primary Decision Maker: Sanjay Kaufman - Child - [phone]    Secondary Decision Maker: Kenn Kaufman - Child - [phone]    Today we documented Decision Maker(s) consistent with Legal Next of Kin hierarchy.    Care Preferences    Ventilation:  \"If you were in your present state of health and suddenly became very ill and were unable to breathe on your own, what would your preference be about the use of a ventilator (breathing machine) if it were available to you?\"      Would the patient desire the use of ventilator (breathing machine)?: yes    \"If your health worsens and it becomes clear that your chance of recovery is unlikely, what would your preference be about the use of a ventilator (breathing machine) if it were available to you?\"     Would the patient desire the use of ventilator (breathing machine)?: No      Resuscitation  \"CPR works best to restart the heart when there is a sudden event, like a heart attack, in someone who is otherwise healthy. Unfortunately, CPR does not typically restart the heart for people who have serious health conditions or who are very sick.\"    \"In the event your heart stopped as a result of an underlying serious health condition, would you want attempts to be made to restart your heart (answer \"yes\" for attempt to resuscitate) or would you prefer a natural death (answer \"no\" for do not attempt to resuscitate)?\" yes       [] Yes   [x] No   Educated Patient / Decision Maker regarding differences between Advance Directives and portable DNR orders.    Length of ACP Conversation in minutes:  3    Conversation Outcomes:  ACP discussion

## 2024-04-16 LAB
ALBUMIN SERPL-MCNC: 2.5 G/DL (ref 3.4–5)
ANION GAP SERPL CALCULATED.3IONS-SCNC: 10 MMOL/L (ref 3–16)
ANION GAP SERPL CALCULATED.3IONS-SCNC: 10 MMOL/L (ref 3–16)
BACTERIA BLD CULT ORG #2: NORMAL
BACTERIA BLD CULT: NORMAL
BASOPHILS # BLD: 0.1 K/UL (ref 0–0.2)
BASOPHILS NFR BLD: 0.4 %
BUN SERPL-MCNC: 28 MG/DL (ref 7–20)
BUN SERPL-MCNC: 29 MG/DL (ref 7–20)
CALCIUM SERPL-MCNC: 8.2 MG/DL (ref 8.3–10.6)
CALCIUM SERPL-MCNC: 8.3 MG/DL (ref 8.3–10.6)
CHLORIDE SERPL-SCNC: 109 MMOL/L (ref 99–110)
CHLORIDE SERPL-SCNC: 112 MMOL/L (ref 99–110)
CO2 SERPL-SCNC: 28 MMOL/L (ref 21–32)
CO2 SERPL-SCNC: 28 MMOL/L (ref 21–32)
CREAT SERPL-MCNC: 1.2 MG/DL (ref 0.8–1.3)
CREAT SERPL-MCNC: 1.2 MG/DL (ref 0.8–1.3)
DEPRECATED RDW RBC AUTO: 14.9 % (ref 12.4–15.4)
EOSINOPHIL # BLD: 0.1 K/UL (ref 0–0.6)
EOSINOPHIL NFR BLD: 1 %
GFR SERPLBLD CREATININE-BSD FMLA CKD-EPI: 60 ML/MIN/{1.73_M2}
GFR SERPLBLD CREATININE-BSD FMLA CKD-EPI: 60 ML/MIN/{1.73_M2}
GLUCOSE BLD-MCNC: 103 MG/DL (ref 70–99)
GLUCOSE BLD-MCNC: 121 MG/DL (ref 70–99)
GLUCOSE BLD-MCNC: 124 MG/DL (ref 70–99)
GLUCOSE BLD-MCNC: 133 MG/DL (ref 70–99)
GLUCOSE BLD-MCNC: 137 MG/DL (ref 70–99)
GLUCOSE BLD-MCNC: 146 MG/DL (ref 70–99)
GLUCOSE BLD-MCNC: 85 MG/DL (ref 70–99)
GLUCOSE SERPL-MCNC: 101 MG/DL (ref 70–99)
GLUCOSE SERPL-MCNC: 141 MG/DL (ref 70–99)
HCT VFR BLD AUTO: 29.6 % (ref 40.5–52.5)
HGB BLD-MCNC: 9.7 G/DL (ref 13.5–17.5)
LYMPHOCYTES # BLD: 0.8 K/UL (ref 1–5.1)
LYMPHOCYTES NFR BLD: 6.6 %
MCH RBC QN AUTO: 25.8 PG (ref 26–34)
MCHC RBC AUTO-ENTMCNC: 32.8 G/DL (ref 31–36)
MCV RBC AUTO: 78.6 FL (ref 80–100)
MONOCYTES # BLD: 0.9 K/UL (ref 0–1.3)
MONOCYTES NFR BLD: 7.5 %
NEUTROPHILS # BLD: 10.6 K/UL (ref 1.7–7.7)
NEUTROPHILS NFR BLD: 84.5 %
PERFORMED ON: ABNORMAL
PERFORMED ON: NORMAL
PHOSPHATE SERPL-MCNC: 3.1 MG/DL (ref 2.5–4.9)
PLATELET # BLD AUTO: 85 K/UL (ref 135–450)
PMV BLD AUTO: 11.5 FL (ref 5–10.5)
POTASSIUM SERPL-SCNC: 3.5 MMOL/L (ref 3.5–5.1)
POTASSIUM SERPL-SCNC: 3.6 MMOL/L (ref 3.5–5.1)
RBC # BLD AUTO: 3.77 M/UL (ref 4.2–5.9)
SODIUM SERPL-SCNC: 147 MMOL/L (ref 136–145)
SODIUM SERPL-SCNC: 147 MMOL/L (ref 136–145)
SODIUM SERPL-SCNC: 150 MMOL/L (ref 136–145)
WBC # BLD AUTO: 12.6 K/UL (ref 4–11)

## 2024-04-16 PROCEDURE — 2580000003 HC RX 258: Performed by: STUDENT IN AN ORGANIZED HEALTH CARE EDUCATION/TRAINING PROGRAM

## 2024-04-16 PROCEDURE — 84295 ASSAY OF SERUM SODIUM: CPT

## 2024-04-16 PROCEDURE — 2060000000 HC ICU INTERMEDIATE R&B

## 2024-04-16 PROCEDURE — 6360000002 HC RX W HCPCS: Performed by: STUDENT IN AN ORGANIZED HEALTH CARE EDUCATION/TRAINING PROGRAM

## 2024-04-16 PROCEDURE — 97110 THERAPEUTIC EXERCISES: CPT

## 2024-04-16 PROCEDURE — 6370000000 HC RX 637 (ALT 250 FOR IP): Performed by: STUDENT IN AN ORGANIZED HEALTH CARE EDUCATION/TRAINING PROGRAM

## 2024-04-16 PROCEDURE — 97530 THERAPEUTIC ACTIVITIES: CPT

## 2024-04-16 PROCEDURE — 92526 ORAL FUNCTION THERAPY: CPT

## 2024-04-16 PROCEDURE — 80069 RENAL FUNCTION PANEL: CPT

## 2024-04-16 PROCEDURE — 36415 COLL VENOUS BLD VENIPUNCTURE: CPT

## 2024-04-16 PROCEDURE — 85025 COMPLETE CBC W/AUTO DIFF WBC: CPT

## 2024-04-16 PROCEDURE — 2580000003 HC RX 258: Performed by: INTERNAL MEDICINE

## 2024-04-16 PROCEDURE — 97116 GAIT TRAINING THERAPY: CPT

## 2024-04-16 PROCEDURE — 94760 N-INVAS EAR/PLS OXIMETRY 1: CPT

## 2024-04-16 RX ORDER — DEXTROSE MONOHYDRATE 50 MG/ML
INJECTION, SOLUTION INTRAVENOUS CONTINUOUS
Status: DISCONTINUED | OUTPATIENT
Start: 2024-04-16 | End: 2024-04-18 | Stop reason: HOSPADM

## 2024-04-16 RX ORDER — INSULIN LISPRO 100 [IU]/ML
0-4 INJECTION, SOLUTION INTRAVENOUS; SUBCUTANEOUS
Status: DISCONTINUED | OUTPATIENT
Start: 2024-04-16 | End: 2024-04-18 | Stop reason: HOSPADM

## 2024-04-16 RX ORDER — GLUCAGON 1 MG/ML
1 KIT INJECTION PRN
Status: DISCONTINUED | OUTPATIENT
Start: 2024-04-16 | End: 2024-04-18 | Stop reason: HOSPADM

## 2024-04-16 RX ORDER — DEXTROSE MONOHYDRATE 100 MG/ML
INJECTION, SOLUTION INTRAVENOUS CONTINUOUS PRN
Status: DISCONTINUED | OUTPATIENT
Start: 2024-04-16 | End: 2024-04-18 | Stop reason: HOSPADM

## 2024-04-16 RX ORDER — INSULIN LISPRO 100 [IU]/ML
0-4 INJECTION, SOLUTION INTRAVENOUS; SUBCUTANEOUS NIGHTLY
Status: DISCONTINUED | OUTPATIENT
Start: 2024-04-16 | End: 2024-04-18 | Stop reason: HOSPADM

## 2024-04-16 RX ADMIN — CEFTRIAXONE SODIUM 2000 MG: 2 INJECTION, POWDER, FOR SOLUTION INTRAMUSCULAR; INTRAVENOUS at 09:11

## 2024-04-16 RX ADMIN — HEPARIN SODIUM 5000 UNITS: 5000 INJECTION INTRAVENOUS; SUBCUTANEOUS at 15:00

## 2024-04-16 RX ADMIN — METOPROLOL TARTRATE 25 MG: 25 TABLET, FILM COATED ORAL at 20:20

## 2024-04-16 RX ADMIN — METOPROLOL TARTRATE 25 MG: 25 TABLET, FILM COATED ORAL at 09:08

## 2024-04-16 RX ADMIN — FINASTERIDE 5 MG: 5 TABLET, FILM COATED ORAL at 09:08

## 2024-04-16 RX ADMIN — HEPARIN SODIUM 5000 UNITS: 5000 INJECTION INTRAVENOUS; SUBCUTANEOUS at 20:19

## 2024-04-16 RX ADMIN — HEPARIN SODIUM 5000 UNITS: 5000 INJECTION INTRAVENOUS; SUBCUTANEOUS at 06:14

## 2024-04-16 RX ADMIN — DEXTROSE MONOHYDRATE: 50 INJECTION, SOLUTION INTRAVENOUS at 20:22

## 2024-04-16 RX ADMIN — Medication 10 ML: at 09:11

## 2024-04-16 RX ADMIN — DEXTROSE MONOHYDRATE: 50 INJECTION, SOLUTION INTRAVENOUS at 10:19

## 2024-04-16 NOTE — PROGRESS NOTES
penetration  Recommend continue minced and moist; thin liquids; meds crushed in puree if able vs. Whole in puree  ST to continue to follow for diet tolerance and monitor for diet upgrade    Recommended Diet and Intervention 4/16/2024:  Solids: IDDSI 5 Minced and moist Solids;   Liquids: IDDSI 0 Thin Liquids;  Meds: rec meds in puree/pudding crushed vs whole     Compensatory Swallowing Strategies:  Aspiration Precautions     Diet order communication:   Diet texture/liquid consistency recommendation communicated to:  [x] RN  [] MD via  [] Dietary floor ambassador  [] other:     EDUCATION:   Provided education regarding role of SLP, results of assessment, recommendations and general speech pathology plan of care.   [x] Pt verbalized understanding and agreement   [x] Pt requires ongoing learning   [] No evidence of comprehension     Dysphagia Prognosis: [] good [x]fair []poor []guarded  Barriers: comorbidities; potential severity of imps     Plan:     Continue Dysphagia Therapy: YES    Interventions: Diet Tolerance Monitoring , Patient/Family Education , Therapeutic Trials with SLP , Instrumental assessment of swallow function (Modified Barium Swallow Study)   Duration/Frequency of therapy while on unit: Speech therapy for dysphagia tx 2-5 times per week during acute care stay.      Discharge Instructions:   Recommend ongoing SLP for dysphagia therapy upon discharge from hospital     This note serves as a D/C Summary in the event that this patient is discharged prior to the next therapy session.    Coded treatment time:0  Total treatment time: 30    Electronically signed by   Nilsa Meléndez MA, CCC-SLP #[id number]  Speech-Language Pathologist  On 04/16/24 at 2:17 PM

## 2024-04-16 NOTE — PLAN OF CARE
Problem: Safety - Adult  Goal: Free from fall injury  Outcome: Progressing     Problem: Discharge Planning  Goal: Discharge to home or other facility with appropriate resources  Outcome: Progressing     Problem: Pain  Goal: Verbalizes/displays adequate comfort level or baseline comfort level  4/15/2024 2302 by Jaye Byrd RN  Outcome: Progressing  4/15/2024 1021 by Diamond Cohen RN  Outcome: Progressing     Problem: Skin/Tissue Integrity  Goal: Absence of new skin breakdown  Description: 1.  Monitor for areas of redness and/or skin breakdown  2.  Assess vascular access sites hourly  3.  Every 4-6 hours minimum:  Change oxygen saturation probe site  4.  Every 4-6 hours:  If on nasal continuous positive airway pressure, respiratory therapy assess nares and determine need for appliance change or resting period.  4/15/2024 2302 by Jaye Byrd RN  Outcome: Progressing  4/15/2024 1021 by Diamond Cohen RN  Outcome: Progressing     Problem: ABCDS Injury Assessment  Goal: Absence of physical injury  Outcome: Progressing     Problem: Confusion  Goal: Confusion, delirium, dementia, or psychosis is improved or at baseline  Description: INTERVENTIONS:  1. Assess for possible contributors to thought disturbance, including medications, impaired vision or hearing, underlying metabolic abnormalities, dehydration, psychiatric diagnoses, and notify attending LIP  2. [place] high risk fall precautions, as indicated  3. Provide frequent short contacts to provide reality reorientation, refocusing and direction  4. Decrease environmental stimuli, including noise as appropriate  5. Monitor and intervene to maintain adequate nutrition, hydration, elimination, sleep and activity  6. If unable to ensure safety without constant attention obtain sitter and review sitter guidelines with assigned personnel  7. Initiate Psychosocial CNS and Spiritual Care consult, as indicated  Outcome: Progressing

## 2024-04-16 NOTE — PROGRESS NOTES
Physical Therapy  Facility/Department: [address] PROGRESSIVE CARE  Physical Therapy treatment session  This note serves as D/C summary if patient is discharged prior to next treatment session.       Name: Reji Kaufman  : 1940  MRN: [id number]  Date of Service: 2024    Discharge Recommendations:  Long Term Care with PT, Long Term Care without PT (at discretion of facility)   PT Equipment Recommendations  Equipment Needed: No  Other: Defer to next level of care.        Patient Diagnosis(es): The primary encounter diagnosis was Septicemia (HCC). Diagnoses of Altered mental status, unspecified altered mental status type, Acute UTI, Acute renal failure, unspecified acute renal failure type (HCC), Hyperkalemia, Hypoglycemia, Sepsis with acute renal failure, due to unspecified organism, unspecified acute renal failure type, unspecified whether septic shock present (HCC), and Goals of care, counseling/discussion were also pertinent to this visit.  Past Medical History:  has a past medical history of Alcohol abuse, AV block, 1st degree, and Hypertension.  Past Surgical History:  has no past surgical history on file.    Assessment   Body Structures, Functions, Activity Limitations Requiring Skilled Therapeutic Intervention: Decreased functional mobility ;Decreased strength;Decreased safe awareness;Decreased cognition;Decreased endurance;Decreased balance  Assessment: 84 y/o male admit 2024 from nursing facility with EVELINA, UTI, and Altered Mental Status. PMH as noted including Prostate Ca, A-Fib, AV Block, Dementia.  Pt admit from LTC setting; appears as if assist for daily care and functional mobility (with Walker) although limited info obtained (pt pleasantly confused).  Today, pt required CGA for transfers and CGA-Luther ambulating short distance with RW.  Requires cues for walker safety/technique.  At this time, anticipate return to LTC setting with/without PT at discretion of facility.  Treatment Diagnosis:

## 2024-04-16 NOTE — DISCHARGE INSTR - COC
Continuity of Care Form    Patient Name: Reji Kaufman   :  1940  MRN:  [id number]    Admit date:  2024  Discharge date:      Code Status Order: Full Code   Advance Directives:     Admitting Physician:  Veronica Tracy MD  PCP: Luis Felipe Jackson MD    Discharging Nurse: STEVEN Lilly  Discharging Hospital Unit/Room#: U3Z-9832/5270-01  Discharging Unit Phone Number: [phone]    Emergency Contact:   Extended Emergency Contact Information  Primary Emergency Contact: Sanjay Kaufman  Address: [address] [place] [address]  Home Phone: [phone]  Mobile Phone: [phone]  Relation: Child  Secondary Emergency Contact: Kenn Kaufman  Home Phone: [phone]  Mobile Phone: [phone]  Relation: Child    Past Surgical History:  History reviewed. No pertinent surgical history.    Immunization History:     There is no immunization history on file for this patient.    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/24 04/15/24 04/15/24 Jean Lockett                Nurse Assessment:  Last Vital Signs: BP (!) 169/64   Pulse 62   Temp 98.2 °F (36.8 °C) (Oral)   Resp 11   Ht 1.829 m (6')   Wt 85.1 kg (187 lb 9.8 oz)   SpO2 95%   BMI 25.44 kg/m²     Last documented pain score (0-10 scale): Pain Level: 0  Last Weight:   Wt Readings from Last 1 Encounters:   24 85.1 kg (187 lb 9.8 oz)     Mental Status:  oriented, alert, able to concentrate and follow conversation, and can be disorientated at times    IV Access:  - None    Nursing Mobility/ADLs:  Walking   Assisted  Transfer  Assisted  Bathing  Assisted  Dressing  Assisted  Toileting  Assisted  Feeding  Assisted  Med Admin

## 2024-04-16 NOTE — CARE COORDINATION
Discharge Planning:   Called to sonSanjay #[phone].   Confirmed discharge plan for patient will be to return to [facility]. Notified that I will update when discharge order is placed and transportation is arranged.     Discharging to Facility/ Agency   Name: [facility]  Address:  [address] [id number]   Phone:  [phone]    Spoke to Kylah in admissions and requested they initiate precert for LTC. Confirmed precert to be initiated today.     Plan: [facility] LTC     Need: Precert, medical transport, and notify son of discharge.     NAY Méndez, LSW, Social Work/Case Management   [phone]  Electronically signed by NAY Méndez on 4/16/2024 at 11:31 AM

## 2024-04-16 NOTE — PROGRESS NOTES
Occupational Therapy  Facility/Department: [address] PROGRESSIVE CARE  Occupational Therapy Daily Treatment Note  Name: Reji Kaufman  : 1940  MRN: [id number]  Date of Service: 2024    Discharge Recommendations:  Long Term Care with OT, Long Term Care without OT     Reji Kaufman scored a 15/24 on the AM-PAC ADL Inpatient form. Current research shows that an AM-PAC score of 17 or less is typically not associated with a discharge to the patient's home setting. Based on the patient's AM-PAC score and their current ADL deficits, it is recommended that the patient have 3-5 sessions per week of Occupational Therapy at d/c to increase the patient's independence.  Please see assessment section for further patient specific details.    If patient discharges prior to next session this note will serve as a discharge summary.  Please see below for the latest assessment towards goals.       Patient Diagnosis(es): The primary encounter diagnosis was Septicemia (HCC). Diagnoses of Altered mental status, unspecified altered mental status type, Acute UTI, Acute renal failure, unspecified acute renal failure type (HCC), Hyperkalemia, Hypoglycemia, Sepsis with acute renal failure, due to unspecified organism, unspecified acute renal failure type, unspecified whether septic shock present (HCC), and Goals of care, counseling/discussion were also pertinent to this visit.  Past Medical History:  has a past medical history of Alcohol abuse, AV block, 1st degree, and Hypertension.  Past Surgical History:  has no past surgical history on file.    Assessment   Performance deficits / Impairments: Decreased functional mobility ;Decreased balance;Decreased ADL status;Decreased endurance;Decreased sensation;Decreased strength;Decreased cognition;Decreased high-level IADLs  Assessment: PTA pt from LTC. Pt is a poor historian. Pt currently requires up to Mod for bed mobility. Pt completes functional mobility and transfers with CGA/Luther and  trial  Hearing  Hearing: Within functional limits  Cognition  Overall Cognitive Status: Exceptions  Arousal/Alertness: Appropriate responses to stimuli  Following Commands: Follows one step commands with increased time;Follows one step commands with repetition  Attention Span: Difficulty attending to directions;Attends with cues to redirect  Memory: Decreased recall of recent events;Decreased short term memory  Problem Solving: Assistance required to identify errors made;Assistance required to implement solutions  Insights: Decreased awareness of deficits  Initiation: Requires cues for all  Sequencing: Requires cues for all               Static Sitting Balance Exercises: SUP seated on commode to attempt BM but unsuccessful x5-7 minutes; SBA seated EOB in prep for fxl tx  Static Standing Balance Exercises: CGA with RW in prep for fxl mobility  Education Given To: Patient  Education Provided: Role of Therapy;Transfer Training;Plan of Care;Equipment;Fall Prevention Strategies  Education Method: Verbal;Demonstration  Barriers to Learning: Cognition  Education Outcome: Continued education needed;Verbalized understanding                    AM-PAC - ADL  AM-PAC Daily Activity - Inpatient   How much help is needed for putting on and taking off regular lower body clothing?: A Lot  How much help is needed for bathing (which includes washing, rinsing, drying)?: A Lot  How much help is needed for toileting (which includes using toilet, bedpan, or urinal)?: Total  How much help is needed for putting on and taking off regular upper body clothing?: A Little  How much help is needed for taking care of personal grooming?: A Little  How much help for eating meals?: None  AM-[facility] Inpatient Daily Activity Raw Score: 15  AM-PAC Inpatient ADL T-Scale Score : 34.69  ADL Inpatient CMS 0-100% Score: 56.46  ADL Inpatient CMS G-Code Modifier : CK    Goals  Short Term Goals  Time Frame for Short Term Goals: prior to D/C  Short Term Goal 1:

## 2024-04-16 NOTE — PROGRESS NOTES
Nephrology Progress Note   [email or web address]      Chief Complaint: confusion    History of Present Illness: Mr Kaufman is an 82 yo pleasantly confused male with a past medical history significant for HTN, SVT s/p ablation, hearing loss, dementia, tobacco abuse and a new diagnosis of metastatic prostate cancer (hospitalized on 24 at  with EVELINA secondary to obstruction) that presents to the ED at [facility] with confusion. Blood work done in the ED revealed worsening kidney function associated with Hyperkalemia (although blood sample was hemolyzed) - Nephrology consulted for recurring EVELINA  Rose placed in the ED and 1 L of urine immediately came out. Urine is cloudy. Patient remians confused/mumbling unable to provide history so history was obtained from ED physician and EPIC chart    Subjective:    In bed; NAD; answering appropriately   Taking PO     ROS: UOP 1400 ml/24    Scheduled Meds:   insulin lispro  0-4 Units SubCUTAneous TID WC    insulin lispro  0-4 Units SubCUTAneous Nightly    [START ON 2024] cefTRIAXone (ROCEPHIN) IV  2,000 mg IntraVENous Q24H    metoprolol tartrate  25 mg Oral BID    finasteride  5 mg Oral Daily    sodium chloride flush  5-40 mL IntraVENous 2 times per day    heparin (porcine)  5,000 Units SubCUTAneous 3 times per day        dextrose 150 mL/hr at 24 1112    dextrose      sodium chloride         PRN Meds:.glucose, dextrose bolus **OR** dextrose bolus, glucagon (rDNA), dextrose, lidocaine, sodium chloride flush, sodium chloride, ondansetron **OR** ondansetron, polyethylene glycol, acetaminophen **OR** acetaminophen    Physical Exam:    TEMPERATURE:  Current - Temp: 98.2 °F (36.8 °C); Max - Temp  Av.5 °F (36.9 °C)  Min: 98.2 °F (36.8 °C)  Max: 98.7 °F (37.1 °C)  RESPIRATIONS RANGE: Resp  Av.9  Min: 11  Max: 23  PULSE RANGE: Pulse  Av.2  Min: 57  Max: 85  BLOOD PRESSURE RANGE:  Systolic (24hrs), Av , Min:156 , Max:182   ; Diastolic (24hrs), Av, Min:58,

## 2024-04-16 NOTE — PROGRESS NOTES
V2.0    [facility] Progress Note      Name:  Reji Kaufman /Age/Sex: 1940  (83 y.o. male)   MRN & CSN:  [id number] & [id number] Encounter Date/Time: 2024 8:57 AM EDT   Location:  [id number] PCP: Luis Felipe Jackson MD     Attending:Veronica Tracy MD       Hospital Day: 6      HPI :   Chief Complaint: AMS.     Reji Kaufman is a 83 y.o. male who presents with  a past medical history of prostate cancer, with bone metastasis, follows with urology, recently admitted with urine retention, had been on Lupron, hypertension, history of SVT s/p ablation, dementia (severity unclear), resides at [facility].  Patient presented to emergency with altered mental status,As per nursing home report patient had had worsening mental status and urinary retention for the past day, at the time of my exam patient is disoriented, only oriented to self, H&P was gathered from ED documentation and accompanied nursing home paperwork.     Patient is known to have metastatic prostate cancer followed by urology and oncology, was recently admitted with acute urine retention resulting in EVELINA at [facility] (2024).      Upon presentation patient was found to be in acute urine retention, 1 L drained after insertion of Rose's catheter, case was discussed with ED provider face-to-face, patient had already been seen by nephrology, no plans for dialysis.   Patient is full code as per documentation from nursing home.      Code status confirmed with son Steffi.   Full code.  Subjective:   Continues to be pleasantly confused.   Review of Systems:      Pertinent positives and negatives discussed in HPI    Objective:     Intake/Output Summary (Last 24 hours) at 2024 1146  Last data filed at 4/15/2024 2309  Gross per 24 hour   Intake 960 ml   Output 875 ml   Net 85 ml        Vitals:   Vitals:    24 0407 24 0600 24 0757 24 0925   BP: (!) 182/80  (!) 169/64    Pulse: 85  79 62   Resp: 23  18 11   Temp: 98.6 °F (37 °C)   rate increased to 125 ml/hr) .   Will recheck this evening.   Insulin sliding scale added.     Hypoglycemia  Improved     Dysphagia  MBS 4/15.   Started on modified diet       Lactic acidosis   Repeat to normalization.      Suspected CAP.   Noted on CXR , will add azithromycin to ceftriaxone.      Acute metabolic encephalopathy.  Dementia.  Worsening altered mental status on top of background dementia likely due to above.  I will continue to observe, CT head unremarkable  Seems less agitated , continues to be confused.      Hypertension .   Hold home medications given hypotension.         Chronic anemia.  Likely due to chronic disease.  Will check iron studies     Social  Son contacted and updated 4/11.   Full code.       Diet ADULT DIET; Dysphagia - Minced and Moist     DVT Prophylaxis [] Lovenox, []  Heparin, [] SCDs, [] Ambulation,  [] Eliquis, [] Xarelto  [] Coumadin   Code Status Full Code   Disposition From: ECF   Expected Disposition: ECF   Estimated Date of Discharge: 1-2 days ( pending improvement of na)   Patient requires continued admission due to bacteremia    Surrogate Decision Maker/ POA  Son      Personally reviewed Lab Studies and Imaging        Medical Decision Making:  The following items were considered in medical decision making:  Discussion of patient care with other providers  Reviewed clinical lab tests  Reviewed radiology tests  Reviewed other diagnostic tests/interventions  Independent review of radiologic images  Microbiology cultures and other micro tests reviewed        Electronically signed by Veronica Tracy MD on 4/16/2024 at 11:46 AM  Comment: Please note this report has been produced using speech recognition software and may contain errors related to that system including errors in grammar, punctuation, and spelling, as well as words and phrases that may be inappropriate. If there are any questions or concerns, please feel free to contact the dictating provider for clarification.

## 2024-04-17 LAB
ALBUMIN SERPL-MCNC: 2.4 G/DL (ref 3.4–5)
ANION GAP SERPL CALCULATED.3IONS-SCNC: 9 MMOL/L (ref 3–16)
BASOPHILS # BLD: 0 K/UL (ref 0–0.2)
BASOPHILS NFR BLD: 0.3 %
BUN SERPL-MCNC: 23 MG/DL (ref 7–20)
CALCIUM SERPL-MCNC: 7.9 MG/DL (ref 8.3–10.6)
CHLORIDE SERPL-SCNC: 104 MMOL/L (ref 99–110)
CO2 SERPL-SCNC: 27 MMOL/L (ref 21–32)
CREAT SERPL-MCNC: 1.1 MG/DL (ref 0.8–1.3)
DEPRECATED RDW RBC AUTO: 15.1 % (ref 12.4–15.4)
EOSINOPHIL # BLD: 0.3 K/UL (ref 0–0.6)
EOSINOPHIL NFR BLD: 2.4 %
GFR SERPLBLD CREATININE-BSD FMLA CKD-EPI: 66 ML/MIN/{1.73_M2}
GLUCOSE BLD-MCNC: 100 MG/DL (ref 70–99)
GLUCOSE BLD-MCNC: 116 MG/DL (ref 70–99)
GLUCOSE BLD-MCNC: 120 MG/DL (ref 70–99)
GLUCOSE BLD-MCNC: 125 MG/DL (ref 70–99)
GLUCOSE BLD-MCNC: 132 MG/DL (ref 70–99)
GLUCOSE BLD-MCNC: 90 MG/DL (ref 70–99)
GLUCOSE SERPL-MCNC: 125 MG/DL (ref 70–99)
HCT VFR BLD AUTO: 26 % (ref 40.5–52.5)
HGB BLD-MCNC: 8.6 G/DL (ref 13.5–17.5)
LYMPHOCYTES # BLD: 1.2 K/UL (ref 1–5.1)
LYMPHOCYTES NFR BLD: 10.1 %
MCH RBC QN AUTO: 26.2 PG (ref 26–34)
MCHC RBC AUTO-ENTMCNC: 33.1 G/DL (ref 31–36)
MCV RBC AUTO: 79.2 FL (ref 80–100)
MONOCYTES # BLD: 0.9 K/UL (ref 0–1.3)
MONOCYTES NFR BLD: 7.4 %
NEUTROPHILS # BLD: 9.5 K/UL (ref 1.7–7.7)
NEUTROPHILS NFR BLD: 79.8 %
PERFORMED ON: ABNORMAL
PERFORMED ON: NORMAL
PHOSPHATE SERPL-MCNC: 3.2 MG/DL (ref 2.5–4.9)
PLATELET # BLD AUTO: 119 K/UL (ref 135–450)
PMV BLD AUTO: 10.7 FL (ref 5–10.5)
POTASSIUM SERPL-SCNC: 3.7 MMOL/L (ref 3.5–5.1)
RBC # BLD AUTO: 3.28 M/UL (ref 4.2–5.9)
SODIUM SERPL-SCNC: 140 MMOL/L (ref 136–145)
WBC # BLD AUTO: 11.9 K/UL (ref 4–11)

## 2024-04-17 PROCEDURE — 6360000002 HC RX W HCPCS: Performed by: STUDENT IN AN ORGANIZED HEALTH CARE EDUCATION/TRAINING PROGRAM

## 2024-04-17 PROCEDURE — 2580000003 HC RX 258: Performed by: INTERNAL MEDICINE

## 2024-04-17 PROCEDURE — 80069 RENAL FUNCTION PANEL: CPT

## 2024-04-17 PROCEDURE — 94760 N-INVAS EAR/PLS OXIMETRY 1: CPT

## 2024-04-17 PROCEDURE — 97530 THERAPEUTIC ACTIVITIES: CPT

## 2024-04-17 PROCEDURE — 2060000000 HC ICU INTERMEDIATE R&B

## 2024-04-17 PROCEDURE — 6370000000 HC RX 637 (ALT 250 FOR IP): Performed by: STUDENT IN AN ORGANIZED HEALTH CARE EDUCATION/TRAINING PROGRAM

## 2024-04-17 PROCEDURE — 2580000003 HC RX 258: Performed by: STUDENT IN AN ORGANIZED HEALTH CARE EDUCATION/TRAINING PROGRAM

## 2024-04-17 PROCEDURE — 36415 COLL VENOUS BLD VENIPUNCTURE: CPT

## 2024-04-17 PROCEDURE — 85025 COMPLETE CBC W/AUTO DIFF WBC: CPT

## 2024-04-17 RX ORDER — CYCLOBENZAPRINE HCL 10 MG
10 TABLET ORAL 3 TIMES DAILY PRN
Status: DISCONTINUED | OUTPATIENT
Start: 2024-04-17 | End: 2024-04-18 | Stop reason: HOSPADM

## 2024-04-17 RX ADMIN — FINASTERIDE 5 MG: 5 TABLET, FILM COATED ORAL at 09:57

## 2024-04-17 RX ADMIN — HEPARIN SODIUM 5000 UNITS: 5000 INJECTION INTRAVENOUS; SUBCUTANEOUS at 05:38

## 2024-04-17 RX ADMIN — HEPARIN SODIUM 5000 UNITS: 5000 INJECTION INTRAVENOUS; SUBCUTANEOUS at 15:51

## 2024-04-17 RX ADMIN — METOPROLOL TARTRATE 25 MG: 25 TABLET, FILM COATED ORAL at 09:57

## 2024-04-17 RX ADMIN — METOPROLOL TARTRATE 25 MG: 25 TABLET, FILM COATED ORAL at 20:42

## 2024-04-17 RX ADMIN — CYCLOBENZAPRINE 10 MG: 10 TABLET, FILM COATED ORAL at 20:42

## 2024-04-17 RX ADMIN — DEXTROSE MONOHYDRATE: 50 INJECTION, SOLUTION INTRAVENOUS at 08:23

## 2024-04-17 RX ADMIN — HEPARIN SODIUM 5000 UNITS: 5000 INJECTION INTRAVENOUS; SUBCUTANEOUS at 20:43

## 2024-04-17 RX ADMIN — ACETAMINOPHEN 650 MG: 325 TABLET ORAL at 23:39

## 2024-04-17 RX ADMIN — CEFTRIAXONE SODIUM 2000 MG: 2 INJECTION, POWDER, FOR SOLUTION INTRAMUSCULAR; INTRAVENOUS at 09:56

## 2024-04-17 RX ADMIN — DEXTROSE MONOHYDRATE: 50 INJECTION, SOLUTION INTRAVENOUS at 01:51

## 2024-04-17 ASSESSMENT — PAIN DESCRIPTION - DESCRIPTORS: DESCRIPTORS: ACHING;SORE

## 2024-04-17 ASSESSMENT — PAIN SCALES - GENERAL: PAINLEVEL_OUTOF10: 3

## 2024-04-17 ASSESSMENT — PAIN - FUNCTIONAL ASSESSMENT: PAIN_FUNCTIONAL_ASSESSMENT: ACTIVITIES ARE NOT PREVENTED

## 2024-04-17 ASSESSMENT — PAIN DESCRIPTION - ORIENTATION: ORIENTATION: RIGHT

## 2024-04-17 ASSESSMENT — PAIN DESCRIPTION - LOCATION: LOCATION: LEG;HIP

## 2024-04-17 NOTE — PROGRESS NOTES
met 4/16  Short Term Goal 2: complete bed mobility with CGA  Short Term Goal 3: complete toileting with CGA  Short Term Goal 4: complete bathing and dressing with Mod A  Short Term Goal 5: tolerate B UE exercises x10 reps for increased strength and endurance with ADLs  Long Term Goals  Time Frame for Long Term Goals : STG=LTG  Patient Goals   Patient goals : no stated goals       Therapy Time   Individual Concurrent Group Co-treatment   Time In 1340         Time Out 1404         Minutes 24         Timed Code Treatment Minutes: 24 Minutes       Milo Estrada OTR/L

## 2024-04-17 NOTE — PROGRESS NOTES
Nephrology Progress Note   [email or web address]      Chief Complaint: confusion    History of Present Illness: Mr Kaufman is an 84 yo pleasantly confused male with a past medical history significant for HTN, SVT s/p ablation, hearing loss, dementia, tobacco abuse and a new diagnosis of metastatic prostate cancer (hospitalized on 24 at  with EVELINA secondary to obstruction) that presents to the ED at [facility] with confusion. Blood work done in the ED revealed worsening kidney function associated with Hyperkalemia (although blood sample was hemolyzed) - Nephrology consulted for recurring EVELINA  Rose placed in the ED and 1 L of urine immediately came out. Urine is cloudy. Patient remians confused/mumbling unable to provide history so history was obtained from ED physician and EPIC chart    Subjective:    In bed; NAD; answering appropriately   Taking PO     ROS: UOP 1700 ml/24    Scheduled Meds:   insulin lispro  0-4 Units SubCUTAneous TID WC    insulin lispro  0-4 Units SubCUTAneous Nightly    cefTRIAXone (ROCEPHIN) IV  2,000 mg IntraVENous Q24H    metoprolol tartrate  25 mg Oral BID    finasteride  5 mg Oral Daily    sodium chloride flush  5-40 mL IntraVENous 2 times per day    heparin (porcine)  5,000 Units SubCUTAneous 3 times per day        dextrose 150 mL/hr at 24 0823    dextrose      sodium chloride         PRN Meds:.glucose, dextrose bolus **OR** dextrose bolus, glucagon (rDNA), dextrose, lidocaine, sodium chloride flush, sodium chloride, ondansetron **OR** ondansetron, polyethylene glycol, acetaminophen **OR** acetaminophen    Physical Exam:    TEMPERATURE:  Current - Temp: 98.3 °F (36.8 °C); Max - Temp  Av.6 °F (37 °C)  Min: 98.3 °F (36.8 °C)  Max: 99.1 °F (37.3 °C)  RESPIRATIONS RANGE: Resp  Av.9  Min: 16  Max: 22  PULSE RANGE: Pulse  Av.6  Min: 63  Max: 103  BLOOD PRESSURE RANGE:  Systolic (24hrs), Av , Min:133 , Max:195   ; Diastolic (24hrs), Av, Min:58, Max:77    24HR

## 2024-04-17 NOTE — PROGRESS NOTES
ml/hr) .   Will recheck this evening.   Insulin sliding scale added.     Hypoglycemia  Improved     Dysphagia  MBS 4/15.   Started on modified diet       Lactic acidosis   Repeat to normalization.      Suspected CAP.   Noted on CXR , will add azithromycin to ceftriaxone.      Acute metabolic encephalopathy.  Dementia.  Worsening altered mental status on top of background dementia likely due to above.  I will continue to observe, CT head unremarkable  Seems less agitated , continues to be confused.      Hypertension .   Hold home medications given hypotension.         Chronic anemia.  Likely due to chronic disease.  Will check iron studies     Social  Son contacted and updated 4/11.   Full code.       Diet ADULT DIET; Dysphagia - Minced and Moist     DVT Prophylaxis [] Lovenox, []  Heparin, [] SCDs, [] Ambulation,  [] Eliquis, [] Xarelto  [] Coumadin   Code Status Full Code   Disposition From: ECF   Expected Disposition: ECF   Estimated Date of Discharge:possible DC tomorrow  Patient requires continued admission due to bacteremia    Surrogate Decision Maker/ POA  Andre      Personally reviewed Lab Studies and Imaging        Medical Decision Making:  The following items were considered in medical decision making:  Discussion of patient care with other providers  Reviewed clinical lab tests  Reviewed radiology tests  Reviewed other diagnostic tests/interventions  Independent review of radiologic images  Microbiology cultures and other micro tests reviewed        Electronically signed by Veronica Tracy MD on 4/17/2024 at 12:24 PM  Comment: Please note this report has been produced using speech recognition software and may contain errors related to that system including errors in grammar, punctuation, and spelling, as well as words and phrases that may be inappropriate. If there are any questions or concerns, please feel free to contact the dictating provider for clarification.

## 2024-04-17 NOTE — PLAN OF CARE
Problem: Safety - Adult  Goal: Free from fall injury  Outcome: Progressing     Problem: Discharge Planning  Goal: Discharge to home or other facility with appropriate resources  Outcome: Progressing     Problem: Pain  Goal: Verbalizes/displays adequate comfort level or baseline comfort level  Outcome: Progressing     Problem: Skin/Tissue Integrity  Goal: Absence of new skin breakdown  Description: 1.  Monitor for areas of redness and/or skin breakdown  2.  Assess vascular access sites hourly  3.  Every 4-6 hours minimum:  Change oxygen saturation probe site  4.  Every 4-6 hours:  If on nasal continuous positive airway pressure, respiratory therapy assess nares and determine need for appliance change or resting period.  Outcome: Progressing     Problem: ABCDS Injury Assessment  Goal: Absence of physical injury  Outcome: Progressing     Problem: Confusion  Goal: Confusion, delirium, dementia, or psychosis is improved or at baseline  Description: INTERVENTIONS:  1. Assess for possible contributors to thought disturbance, including medications, impaired vision or hearing, underlying metabolic abnormalities, dehydration, psychiatric diagnoses, and notify attending LIP  2. [place] high risk fall precautions, as indicated  3. Provide frequent short contacts to provide reality reorientation, refocusing and direction  4. Decrease environmental stimuli, including noise as appropriate  5. Monitor and intervene to maintain adequate nutrition, hydration, elimination, sleep and activity  6. If unable to ensure safety without constant attention obtain sitter and review sitter guidelines with assigned personnel  7. Initiate Psychosocial CNS and Spiritual Care consult, as indicated  Outcome: Progressing

## 2024-04-17 NOTE — PLAN OF CARE
Problem: Safety - Adult  Goal: Free from fall injury  4/17/2024 0001 by Chante Clark RN  Outcome: Progressing  Flowsheets (Taken 4/17/2024 0001)  Free From Fall Injury: Instruct family/caregiver on patient safety     Problem: Discharge Planning  Goal: Discharge to home or other facility with appropriate resources  Outcome: Progressing  Flowsheets (Taken 4/17/2024 0001)  Discharge to home or other facility with appropriate resources:   Identify barriers to discharge with patient and caregiver   Arrange for needed discharge resources and transportation as appropriate   Identify discharge learning needs (meds, wound care, etc)     Problem: Pain  Goal: Verbalizes/displays adequate comfort level or baseline comfort level  Outcome: Progressing  Flowsheets (Taken 4/17/2024 0001)  Verbalizes/displays adequate comfort level or baseline comfort level:   Encourage patient to monitor pain and request assistance   Assess pain using appropriate pain scale   Administer analgesics based on type and severity of pain and evaluate response   Implement non-pharmacological measures as appropriate and evaluate response   Consider cultural and social influences on pain and pain management     Problem: Skin/Tissue Integrity  Goal: Absence of new skin breakdown  Description: 1.  Monitor for areas of redness and/or skin breakdown  2.  Assess vascular access sites hourly  3.  Every 4-6 hours minimum:  Change oxygen saturation probe site  4.  Every 4-6 hours:  If on nasal continuous positive airway pressure, respiratory therapy assess nares and determine need for appliance change or resting period.  Outcome: Progressing     Problem: ABCDS Injury Assessment  Goal: Absence of physical injury  Outcome: Progressing  Flowsheets (Taken 4/17/2024 0001)  Absence of Physical Injury: Implement safety measures based on patient assessment     Problem: Confusion  Goal: Confusion, delirium, dementia, or psychosis is improved or at baseline  Description:

## 2024-04-18 VITALS
HEART RATE: 66 BPM | OXYGEN SATURATION: 94 % | HEIGHT: 72 IN | WEIGHT: 186.73 LBS | BODY MASS INDEX: 25.29 KG/M2 | TEMPERATURE: 98.3 F | DIASTOLIC BLOOD PRESSURE: 65 MMHG | RESPIRATION RATE: 17 BRPM | SYSTOLIC BLOOD PRESSURE: 147 MMHG

## 2024-04-18 LAB
ALBUMIN SERPL-MCNC: 2.4 G/DL (ref 3.4–5)
ANION GAP SERPL CALCULATED.3IONS-SCNC: 7 MMOL/L (ref 3–16)
BASOPHILS # BLD: 0.1 K/UL (ref 0–0.2)
BASOPHILS NFR BLD: 0.6 %
BUN SERPL-MCNC: 16 MG/DL (ref 7–20)
CALCIUM SERPL-MCNC: 7.8 MG/DL (ref 8.3–10.6)
CHLORIDE SERPL-SCNC: 104 MMOL/L (ref 99–110)
CO2 SERPL-SCNC: 27 MMOL/L (ref 21–32)
CREAT SERPL-MCNC: 1 MG/DL (ref 0.8–1.3)
DEPRECATED RDW RBC AUTO: 14.9 % (ref 12.4–15.4)
EOSINOPHIL # BLD: 0.3 K/UL (ref 0–0.6)
EOSINOPHIL NFR BLD: 2.7 %
GFR SERPLBLD CREATININE-BSD FMLA CKD-EPI: 74 ML/MIN/{1.73_M2}
GLUCOSE BLD-MCNC: 40 MG/DL (ref 70–99)
GLUCOSE BLD-MCNC: 79 MG/DL (ref 70–99)
GLUCOSE BLD-MCNC: 79 MG/DL (ref 70–99)
GLUCOSE BLD-MCNC: 80 MG/DL (ref 70–99)
GLUCOSE BLD-MCNC: 85 MG/DL (ref 70–99)
GLUCOSE BLD-MCNC: 86 MG/DL (ref 70–99)
GLUCOSE BLD-MCNC: 96 MG/DL (ref 70–99)
GLUCOSE BLD-MCNC: 99 MG/DL (ref 70–99)
GLUCOSE SERPL-MCNC: 96 MG/DL (ref 70–99)
HCT VFR BLD AUTO: 26.3 % (ref 40.5–52.5)
HGB BLD-MCNC: 8.8 G/DL (ref 13.5–17.5)
LYMPHOCYTES # BLD: 1.2 K/UL (ref 1–5.1)
LYMPHOCYTES NFR BLD: 12.6 %
MCH RBC QN AUTO: 26.4 PG (ref 26–34)
MCHC RBC AUTO-ENTMCNC: 33.4 G/DL (ref 31–36)
MCV RBC AUTO: 79 FL (ref 80–100)
MONOCYTES # BLD: 0.8 K/UL (ref 0–1.3)
MONOCYTES NFR BLD: 8.2 %
NEUTROPHILS # BLD: 7.3 K/UL (ref 1.7–7.7)
NEUTROPHILS NFR BLD: 75.9 %
PERFORMED ON: ABNORMAL
PERFORMED ON: NORMAL
PHOSPHATE SERPL-MCNC: 3.4 MG/DL (ref 2.5–4.9)
PLATELET # BLD AUTO: 147 K/UL (ref 135–450)
PMV BLD AUTO: 10.9 FL (ref 5–10.5)
POTASSIUM SERPL-SCNC: 3.3 MMOL/L (ref 3.5–5.1)
RBC # BLD AUTO: 3.33 M/UL (ref 4.2–5.9)
SODIUM SERPL-SCNC: 138 MMOL/L (ref 136–145)
WBC # BLD AUTO: 9.7 K/UL (ref 4–11)

## 2024-04-18 PROCEDURE — 6370000000 HC RX 637 (ALT 250 FOR IP): Performed by: STUDENT IN AN ORGANIZED HEALTH CARE EDUCATION/TRAINING PROGRAM

## 2024-04-18 PROCEDURE — 6360000002 HC RX W HCPCS: Performed by: STUDENT IN AN ORGANIZED HEALTH CARE EDUCATION/TRAINING PROGRAM

## 2024-04-18 PROCEDURE — 94760 N-INVAS EAR/PLS OXIMETRY 1: CPT

## 2024-04-18 PROCEDURE — 85025 COMPLETE CBC W/AUTO DIFF WBC: CPT

## 2024-04-18 PROCEDURE — 97530 THERAPEUTIC ACTIVITIES: CPT

## 2024-04-18 PROCEDURE — 97535 SELF CARE MNGMENT TRAINING: CPT

## 2024-04-18 PROCEDURE — 36415 COLL VENOUS BLD VENIPUNCTURE: CPT

## 2024-04-18 PROCEDURE — 2580000003 HC RX 258: Performed by: STUDENT IN AN ORGANIZED HEALTH CARE EDUCATION/TRAINING PROGRAM

## 2024-04-18 PROCEDURE — 80069 RENAL FUNCTION PANEL: CPT

## 2024-04-18 PROCEDURE — 97110 THERAPEUTIC EXERCISES: CPT

## 2024-04-18 PROCEDURE — 2580000003 HC RX 258: Performed by: INTERNAL MEDICINE

## 2024-04-18 PROCEDURE — 97116 GAIT TRAINING THERAPY: CPT

## 2024-04-18 RX ORDER — POTASSIUM CHLORIDE 20 MEQ/1
40 TABLET, EXTENDED RELEASE ORAL ONCE
Status: DISCONTINUED | OUTPATIENT
Start: 2024-04-18 | End: 2024-04-18 | Stop reason: HOSPADM

## 2024-04-18 RX ADMIN — HEPARIN SODIUM 5000 UNITS: 5000 INJECTION INTRAVENOUS; SUBCUTANEOUS at 05:34

## 2024-04-18 RX ADMIN — CEFTRIAXONE SODIUM 2000 MG: 2 INJECTION, POWDER, FOR SOLUTION INTRAMUSCULAR; INTRAVENOUS at 09:18

## 2024-04-18 RX ADMIN — DEXTROSE MONOHYDRATE: 50 INJECTION, SOLUTION INTRAVENOUS at 06:44

## 2024-04-18 RX ADMIN — METOPROLOL TARTRATE 25 MG: 25 TABLET, FILM COATED ORAL at 09:12

## 2024-04-18 RX ADMIN — Medication 16 G: at 12:05

## 2024-04-18 RX ADMIN — FINASTERIDE 5 MG: 5 TABLET, FILM COATED ORAL at 09:12

## 2024-04-18 NOTE — PROGRESS NOTES
Nephrology Progress Note   [email or web address]      Chief Complaint: confusion    History of Present Illness: Mr Kaufman is an 84 yo pleasantly confused male with a past medical history significant for HTN, SVT s/p ablation, hearing loss, dementia, tobacco abuse and a new diagnosis of metastatic prostate cancer (hospitalized on 24 at  with EVELINA secondary to obstruction) that presents to the ED at [facility] with confusion. Blood work done in the ED revealed worsening kidney function associated with Hyperkalemia (although blood sample was hemolyzed) - Nephrology consulted for recurring EVELINA  Rose placed in the ED and 1 L of urine immediately came out. Urine is cloudy. Patient remians confused/mumbling unable to provide history so history was obtained from ED physician and EPIC chart    Subjective:    In bed; NAD;    ROS: UOP noted .  No acute complaints .     Scheduled Meds:   potassium chloride  40 mEq Oral Once    insulin lispro  0-4 Units SubCUTAneous TID WC    insulin lispro  0-4 Units SubCUTAneous Nightly    metoprolol tartrate  25 mg Oral BID    finasteride  5 mg Oral Daily    sodium chloride flush  5-40 mL IntraVENous 2 times per day    heparin (porcine)  5,000 Units SubCUTAneous 3 times per day        dextrose Stopped (24 0930)    dextrose      sodium chloride         PRN Meds:.cyclobenzaprine, glucose, dextrose bolus **OR** dextrose bolus, glucagon (rDNA), dextrose, lidocaine, sodium chloride flush, sodium chloride, ondansetron **OR** ondansetron, polyethylene glycol, acetaminophen **OR** acetaminophen    Physical Exam:    TEMPERATURE:  Current - Temp: 97.6 °F (36.4 °C); Max - Temp  Av.5 °F (36.9 °C)  Min: 97.6 °F (36.4 °C)  Max: 99.4 °F (37.4 °C)  RESPIRATIONS RANGE: Resp  Av  Min: 16  Max: 20  PULSE RANGE: Pulse  Av.3  Min: 61  Max: 77  BLOOD PRESSURE RANGE:  Systolic (24hrs), Av , Min:138 , Max:165   ; Diastolic (24hrs), Av, Min:64, Max:72    24HR INTAKE/OUTPUT:     Intake/Output Summary (Last 24 hours) at 4/18/2024 1422  Last data filed at 4/18/2024 1342  Gross per 24 hour   Intake 5631.91 ml   Output 2250 ml   Net 3381.91 ml             Patient Vitals for the past 96 hrs (Last 3 readings):   Weight   04/18/24 0402 84.7 kg (186 lb 11.7 oz)   04/17/24 0405 85.6 kg (188 lb 11.4 oz)   04/16/24 0600 85.1 kg (187 lb 9.8 oz)         General: Awake, answering appropriately  HEENT: Normocephalic, atraumatic  Eyes: EOMI, CHINYERE  Chest: clear to auscultation, no intercostal retractions  CVS: RRR, no murmur, no rub  Abdomen: soft, non tender, no organomegaly  Extremities: no edema, no cyanosis.  Skin: normal texture, normal skin turgor, no rash  : corado in place    Allergies:  No Known Allergies   LAB DATA:    CBC:   Lab Results   Component Value Date/Time    WBC 9.7 04/18/2024 06:05 AM    RBC 3.33 04/18/2024 06:05 AM    HGB 8.8 04/18/2024 06:05 AM    HCT 26.3 04/18/2024 06:05 AM    MCV 79.0 04/18/2024 06:05 AM    MCH 26.4 04/18/2024 06:05 AM    MCHC 33.4 04/18/2024 06:05 AM    RDW 14.9 04/18/2024 06:05 AM     04/18/2024 06:05 AM    MPV 10.9 04/18/2024 06:05 AM     BMP:    Lab Results   Component Value Date/Time     04/18/2024 06:05 AM    K 3.3 04/18/2024 06:05 AM    K 5.8 04/11/2024 08:55 AM     04/18/2024 06:05 AM    CO2 27 04/18/2024 06:05 AM    BUN 16 04/18/2024 06:05 AM    CREATININE 1.0 04/18/2024 06:05 AM    CALCIUM 7.8 04/18/2024 06:05 AM    LABGLOM 74 04/18/2024 06:05 AM    GLUCOSE 96 04/18/2024 06:05 AM     Ionized Calcium:  No results found for: \"IONCA\"  Magnesium:  No results found for: \"MG\"  Phosphorus:    Lab Results   Component Value Date/Time    PHOS 3.4 04/18/2024 06:05 AM     U/A:    Lab Results   Component Value Date/Time    COLORU Yellow 04/11/2024 08:55 AM    PHUR 6.0 04/11/2024 08:55 AM    WBCUA >100 04/11/2024 08:55 AM    RBCUA 11-20 04/11/2024 08:55 AM    BACTERIA 4+ 04/11/2024 08:55 AM    CLARITYU CLOUDY 04/11/2024 08:55 AM    SPECGRAV 1.015

## 2024-04-18 NOTE — PLAN OF CARE
Problem: Safety - Adult  Goal: Free from fall injury  4/18/2024 1434 by Vijaya Fournier RN  Outcome: Adequate for Discharge  4/18/2024 0208 by Evangelina Lew RN  Outcome: Progressing  Flowsheets (Taken 4/17/2024 0001 by Chante Clark RN)  Free From Fall Injury: Instruct family/caregiver on patient safety     Problem: Discharge Planning  Goal: Discharge to home or other facility with appropriate resources  4/18/2024 1434 by Vijaya Fournier RN  Outcome: Adequate for Discharge  4/18/2024 0208 by Evangelina Lew RN  Outcome: Progressing  Flowsheets (Taken 4/17/2024 2046)  Discharge to home or other facility with appropriate resources: Identify barriers to discharge with patient and caregiver     Problem: Pain  Goal: Verbalizes/displays adequate comfort level or baseline comfort level  4/18/2024 1434 by Vijaya Fournier RN  Outcome: Adequate for Discharge  4/18/2024 0208 by Evangelina Lew RN  Outcome: Progressing  Flowsheets (Taken 4/18/2024 0208)  Verbalizes/displays adequate comfort level or baseline comfort level:   Encourage patient to monitor pain and request assistance   Assess pain using appropriate pain scale   Administer analgesics based on type and severity of pain and evaluate response   Implement non-pharmacological measures as appropriate and evaluate response     Problem: Skin/Tissue Integrity  Goal: Absence of new skin breakdown  Description: 1.  Monitor for areas of redness and/or skin breakdown  2.  Assess vascular access sites hourly  3.  Every 4-6 hours minimum:  Change oxygen saturation probe site  4.  Every 4-6 hours:  If on nasal continuous positive airway pressure, respiratory therapy assess nares and determine need for appliance change or resting period.  Outcome: Adequate for Discharge     Problem: ABCDS Injury Assessment  Goal: Absence of physical injury  Outcome: Adequate for Discharge     Problem: Confusion  Goal: Confusion, delirium, dementia, or psychosis is improved or at baseline  Description:

## 2024-04-18 NOTE — CARE COORDINATION
04/18/24 1323   IMM Letter   IMM Letter given to Patient/Family/Significant other/Guardian/POA/by: Provided to patient at bedside by Freddy Vines RN CM. Education provided to patient, patient reported no questions and verbalized understanding. Patient aware of 4 hours allotted time to determine if they choose to pursue Medicare appeal process. Patient verbalizes readiness for discharge.   IMM Letter date given: 04/18/24   IMM Letter time given: 1315     Electronically signed by FREDDY VINES RN on 4/18/2024 at 1:24 PM

## 2024-04-18 NOTE — DISCHARGE SUMMARY
nightly      tamsulosin (FLOMAX) 0.4 MG capsule Take 1 capsule by mouth daily      acetaminophen (TYLENOL) 325 MG tablet Take 2 tablets by mouth every 6 hours as needed for Pain      vitamin B-12 (CYANOCOBALAMIN) 500 MCG tablet Take 1 tablet by mouth daily      dextrose 5 % and 0.45 % NaCl 5-0.45 % SOLN 500 mL infusion Infuse 60 mL/m2/hr intravenously continuous For 4 liters      metoprolol tartrate (LOPRESSOR) 25 MG tablet Take 1 tablet by mouth 2 times daily      fluticasone (FLONASE) 50 MCG/ACT nasal spray 1 spray by Each Nostril route daily             Time Spent on discharge: 45 in the examination, evaluation, counseling and review of medications and discharge plan.      Signed:    Veronica Tracy MD   4/18/2024      Thank you Luis Felipe Jackson MD for the opportunity to be involved in this patient's care. If you have any questions or concerns, please feel free to contact me at [phone].    Comment: Please note this report has been produced using speech recognition software and may contain errors related to that system including errors in grammar, punctuation, and spelling, as well as words and phrases that may be inappropriate. If there are any questions or concerns, please feel free to contact the dictating provider for clarification.

## 2024-04-18 NOTE — PROGRESS NOTES
Report given to [employer], patient sent with belongings, wallet, glasses, and cell phone. Son updated. No complications. Report called to wili to given update on patient returning. Spoke with Miranda and then transferred to nursing floor. Report given, all questions answered. Call back number provided if anything new arises. No complications.     Electronically signed by Vijaya Fournier RN on 4/18/2024 at 5:54 PM

## 2024-04-18 NOTE — PROGRESS NOTES
Patient BG 40 after check, PCA instructed to get juice with sugar packets and RN pulled glucose tabs from [employer], see eMAR. Patient does not have IV access currently but charge RN notified for possible US guided placement. Patient currently eating tabs and has drank 120 mL of apple juice. MD notified. Patient asymptomatic, alert and orientated. RN to remain at bedside until sugar increased and patient eats glucose tabs. Plan of care ongoing.     Electronically signed by Vijaya Fournier RN on 4/18/2024 at 12:15 PM

## 2024-04-18 NOTE — PROGRESS NOTES
Occupational Therapy  Facility/Department: [address] PROGRESSIVE CARE  Occupational Therapy Daily Treatment    Name: Reji Kaufman  : 1940  MRN: [id number]  Date of Service: 2024    Discharge Recommendations:  Long Term Care with OT, Long Term Care without OT  Reji Kaufman scored a 16/24 on the AM-PAC ADL Inpatient form. Current research shows that an AM-PAC score of 17 or less is typically not associated with a discharge to the patient's home setting. Based on the patient's AM-PAC score and their current ADL deficits, it is recommended that the patient have 3-5 sessions per week of Occupational Therapy at d/c to increase the patient's independence.  Please see assessment section for further patient specific details.    If patient discharges prior to next session this note will serve as a discharge summary.  Please see below for the latest assessment towards goals.          Patient Diagnosis(es): The primary encounter diagnosis was Septicemia (HCC). Diagnoses of Altered mental status, unspecified altered mental status type, Acute UTI, Acute renal failure, unspecified acute renal failure type (HCC), Hyperkalemia, Hypoglycemia, Sepsis with acute renal failure, due to unspecified organism, unspecified acute renal failure type, unspecified whether septic shock present (HCC), and Goals of care, counseling/discussion were also pertinent to this visit.  Past Medical History:  has a past medical history of Alcohol abuse, AV block, 1st degree, and Hypertension.  Past Surgical History:  has no past surgical history on file.           Assessment   Performance deficits / Impairments: Decreased functional mobility ;Decreased balance;Decreased ADL status;Decreased endurance;Decreased sensation;Decreased strength;Decreased cognition;Decreased high-level IADLs  Assessment: Pt tolerated session well. Today- pt completed all fxl transfers/mob w/ RW and CGA, standing grooming tasks w/ CGA, and CGA-Min A for LB dressing. Pt  History  Social/Functional History  Type of Home: Facility ([facility]  Home Layout: One level  ADL Assistance: Needs assistance  Homemaking Assistance:  (Facility takes care of homemaking needs.)  Ambulation Assistance: Needs assistance (With Walker.)  Transfer Assistance: Independent  Additional Comments: Limited information obtained; pt is poor historian.       Objective   Safety Devices  Type of Devices: All fall risk precautions in place;Call light within reach;Chair alarm in place;Gait belt;Left in chair  Restraints  Restraints Initially in Place: No     Toilet Transfers  Toilet - Technique: Ambulating (RW)  Equipment Used: Grab bars  Toilet Transfer: Contact guard assistance  Toilet Transfers Comments: cues to keep RW in front of him throughout the transfer     ADL  Grooming: Contact guard assistance  Grooming Skilled Clinical Factors: pt completed hand and facial hygiene while standing at the sink  LE Dressing: Contact guard assistance;Minimal assistance  LE Dressing Skilled Clinical Factors: CGA-Min A for doffing/donning non slip socks while seated in recliner, leaning over to complete task  Toileting: Dependent/Total  Toileting Skilled Clinical Factors: Rose. Pt attempting to void BM at the toilet, but unsuccessful  Functional Mobility: Contact guard assistance  Functional Mobility Skilled Clinical Factors: RW. Pt completed fxl mob in room for ~25ft+5ft+20ft w/ CGA, no unsteadiness  Skin Care: Soap and water     Activity Tolerance  Activity Tolerance Comments: Pt fatigues easily and required seated rest breaks.  Bed mobility  Supine to Sit: Unable to assess  Sit to Supine: Unable to assess  Bed Mobility Comments: Pt began and ended session in recliner  Transfers  Sit to stand: Contact guard assistance  Stand to sit: Contact guard assistance  Transfer Comments: to/from RW  Vision  Vision: Within Functional Limits  Hearing  Hearing: Within functional limits  Cognition  Overall Cognitive Status:

## 2024-04-18 NOTE — PROGRESS NOTES
Physical Therapy  Facility/Department: [address] PROGRESSIVE CARE  Physical Therapy Daily Treatment Session    Name: Reji Kaufman  : 1940  MRN: [id number]  Date of Service: 2024    Discharge Recommendations:  Long Term Care with PT, Long Term Care without PT (At discretion of the facility)   PT Equipment Recommendations  Equipment Needed: No  Other: Defer to next level of care.      Patient Diagnosis(es): The primary encounter diagnosis was Septicemia (HCC). Diagnoses of Altered mental status, unspecified altered mental status type, Acute UTI, Acute renal failure, unspecified acute renal failure type (HCC), Hyperkalemia, Hypoglycemia, Sepsis with acute renal failure, due to unspecified organism, unspecified acute renal failure type, unspecified whether septic shock present (HCC), and Goals of care, counseling/discussion were also pertinent to this visit.  Past Medical History:  has a past medical history of Alcohol abuse, AV block, 1st degree, and Hypertension.  Past Surgical History:  has no past surgical history on file.    Assessment   Body Structures, Functions, Activity Limitations Requiring Skilled Therapeutic Intervention: Decreased functional mobility ;Decreased strength;Decreased safe awareness;Decreased cognition;Decreased endurance;Decreased balance;Increased pain  Assessment: 82 y/o male admit 2024 from nursing facility with EVELINA, UTI, and Altered Mental Status. PMH as noted including Prostate Ca, A-Fib, AV Block, Dementia. Pt admit from LTC setting; appears as if assist for daily care and functional mobility (with Walker) although limited info obtained (pt pleasantly confused). Today, pt was able to ambulate short distances within the hospital room with use of RW. Pt fatigues easily and requires frequent seated rest breaks. Pt will benefit from continued skilled PT to facilitate return to PLOF and to promote independence.  Treatment Diagnosis: Impaired functional mobility  History: 82 y/o

## 2024-04-18 NOTE — PLAN OF CARE
Problem: Safety - Adult  Goal: Free from fall injury  4/18/2024 0208 by Evangelina Lew, RN  Outcome: Progressing  Flowsheets (Taken 4/17/2024 0001 by Chante Clark RN)  Free From Fall Injury: Instruct family/caregiver on patient safety     Problem: Pain  Goal: Verbalizes/displays adequate comfort level or baseline comfort level  4/18/2024 0208 by Evangelina Lew, RN  Outcome: Progressing  Flowsheets (Taken 4/18/2024 0208)  Verbalizes/displays adequate comfort level or baseline comfort level:   Encourage patient to monitor pain and request assistance   Assess pain using appropriate pain scale   Administer analgesics based on type and severity of pain and evaluate response   Implement non-pharmacological measures as appropriate and evaluate response    Problem: Confusion  Goal: Confusion, delirium, dementia, or psychosis is improved or at baseline  Description: INTERVENTIONS:  1. Assess for possible contributors to thought disturbance, including medications, impaired vision or hearing, underlying metabolic abnormalities, dehydration, psychiatric diagnoses, and notify attending LIP  2. [place] high risk fall precautions, as indicated  3. Provide frequent short contacts to provide reality reorientation, refocusing and direction  4. Decrease environmental stimuli, including noise as appropriate  5. Monitor and intervene to maintain adequate nutrition, hydration, elimination, sleep and activity  6. If unable to ensure safety without constant attention obtain sitter and review sitter guidelines with assigned personnel  7. Initiate Psychosocial CNS and Spiritual Care consult, as indicated  4/18/2024 0208 by Evangelina Lew, RN  Outcome: Progressing

## 2024-05-14 ENCOUNTER — APPOINTMENT (OUTPATIENT)
Dept: CT IMAGING | Age: 84
DRG: 698 | End: 2024-05-14
Payer: MEDICARE

## 2024-05-14 ENCOUNTER — HOSPITAL ENCOUNTER (INPATIENT)
Age: 84
LOS: 7 days | Discharge: NURSING, REHAB OR LONG-TERM CARE | DRG: 698 | End: 2024-05-22
Attending: STUDENT IN AN ORGANIZED HEALTH CARE EDUCATION/TRAINING PROGRAM | Admitting: STUDENT IN AN ORGANIZED HEALTH CARE EDUCATION/TRAINING PROGRAM
Payer: MEDICARE

## 2024-05-14 ENCOUNTER — APPOINTMENT (OUTPATIENT)
Dept: GENERAL RADIOLOGY | Age: 84
DRG: 698 | End: 2024-05-14
Payer: MEDICARE

## 2024-05-14 DIAGNOSIS — J18.9 PNEUMONIA DUE TO INFECTIOUS ORGANISM, UNSPECIFIED LATERALITY, UNSPECIFIED PART OF LUNG: ICD-10-CM

## 2024-05-14 DIAGNOSIS — R41.82 ALTERED MENTAL STATUS, UNSPECIFIED ALTERED MENTAL STATUS TYPE: Primary | ICD-10-CM

## 2024-05-14 DIAGNOSIS — E87.0 HYPERNATREMIA: ICD-10-CM

## 2024-05-14 DIAGNOSIS — T83.511A URINARY TRACT INFECTION ASSOCIATED WITH INDWELLING URETHRAL CATHETER, INITIAL ENCOUNTER (HCC): ICD-10-CM

## 2024-05-14 DIAGNOSIS — N39.0 URINARY TRACT INFECTION ASSOCIATED WITH INDWELLING URETHRAL CATHETER, INITIAL ENCOUNTER (HCC): ICD-10-CM

## 2024-05-14 DIAGNOSIS — A41.9 SEPTICEMIA (HCC): ICD-10-CM

## 2024-05-14 DIAGNOSIS — N17.9 AKI (ACUTE KIDNEY INJURY) (HCC): ICD-10-CM

## 2024-05-14 PROCEDURE — 85025 COMPLETE CBC W/AUTO DIFF WBC: CPT

## 2024-05-14 PROCEDURE — 83605 ASSAY OF LACTIC ACID: CPT

## 2024-05-14 PROCEDURE — 71045 X-RAY EXAM CHEST 1 VIEW: CPT

## 2024-05-14 PROCEDURE — 99285 EMERGENCY DEPT VISIT HI MDM: CPT

## 2024-05-14 PROCEDURE — 70450 CT HEAD/BRAIN W/O DYE: CPT

## 2024-05-14 PROCEDURE — 87186 SC STD MICRODIL/AGAR DIL: CPT

## 2024-05-14 PROCEDURE — 87150 DNA/RNA AMPLIFIED PROBE: CPT

## 2024-05-14 PROCEDURE — 87077 CULTURE AEROBIC IDENTIFY: CPT

## 2024-05-14 PROCEDURE — 87040 BLOOD CULTURE FOR BACTERIA: CPT

## 2024-05-14 RX ORDER — 0.9 % SODIUM CHLORIDE 0.9 %
500 INTRAVENOUS SOLUTION INTRAVENOUS ONCE
Status: COMPLETED | OUTPATIENT
Start: 2024-05-14 | End: 2024-05-15

## 2024-05-14 RX ORDER — ACETAMINOPHEN 325 MG/1
650 TABLET ORAL ONCE
Status: COMPLETED | OUTPATIENT
Start: 2024-05-14 | End: 2024-05-15

## 2024-05-14 ASSESSMENT — PAIN - FUNCTIONAL ASSESSMENT: PAIN_FUNCTIONAL_ASSESSMENT: NONE - DENIES PAIN

## 2024-05-15 ENCOUNTER — APPOINTMENT (OUTPATIENT)
Dept: CT IMAGING | Age: 84
DRG: 698 | End: 2024-05-15
Payer: MEDICARE

## 2024-05-15 PROBLEM — N39.0 UTI (URINARY TRACT INFECTION): Status: ACTIVE | Noted: 2024-05-15

## 2024-05-15 LAB
ALBUMIN SERPL-MCNC: 2.3 G/DL (ref 3.4–5)
ALBUMIN/GLOB SERPL: 0.5 {RATIO} (ref 1.1–2.2)
ALP SERPL-CCNC: 116 U/L (ref 40–129)
ALT SERPL-CCNC: 18 U/L (ref 10–40)
ANION GAP SERPL CALCULATED.3IONS-SCNC: 12 MMOL/L (ref 3–16)
ANION GAP SERPL CALCULATED.3IONS-SCNC: 13 MMOL/L (ref 3–16)
ANION GAP SERPL CALCULATED.3IONS-SCNC: 16 MMOL/L (ref 3–16)
ANION GAP SERPL CALCULATED.3IONS-SCNC: 9 MMOL/L (ref 3–16)
AST SERPL-CCNC: 28 U/L (ref 15–37)
BACTERIA URNS QL MICRO: ABNORMAL /HPF
BASOPHILS # BLD: 0.2 K/UL (ref 0–0.2)
BASOPHILS NFR BLD: 0.9 %
BILIRUB SERPL-MCNC: 0.7 MG/DL (ref 0–1)
BILIRUB UR QL STRIP.AUTO: NEGATIVE
BUN SERPL-MCNC: 70 MG/DL (ref 7–20)
BUN SERPL-MCNC: 74 MG/DL (ref 7–20)
BUN SERPL-MCNC: 76 MG/DL (ref 7–20)
BUN SERPL-MCNC: 77 MG/DL (ref 7–20)
CALCIUM SERPL-MCNC: 8.2 MG/DL (ref 8.3–10.6)
CALCIUM SERPL-MCNC: 8.4 MG/DL (ref 8.3–10.6)
CALCIUM SERPL-MCNC: 8.5 MG/DL (ref 8.3–10.6)
CALCIUM SERPL-MCNC: 8.6 MG/DL (ref 8.3–10.6)
CHLORIDE SERPL-SCNC: 128 MMOL/L (ref 99–110)
CHLORIDE SERPL-SCNC: 130 MMOL/L (ref 99–110)
CHLORIDE SERPL-SCNC: 131 MMOL/L (ref 99–110)
CHLORIDE SERPL-SCNC: 132 MMOL/L (ref 99–110)
CHLORIDE UR-SCNC: 43 MMOL/L
CLARITY UR: ABNORMAL
CO2 SERPL-SCNC: 21 MMOL/L (ref 21–32)
CO2 SERPL-SCNC: 25 MMOL/L (ref 21–32)
CO2 SERPL-SCNC: 25 MMOL/L (ref 21–32)
CO2 SERPL-SCNC: 26 MMOL/L (ref 21–32)
COARSE GRAN CASTS #/AREA URNS LPF: ABNORMAL /LPF (ref 0–2)
COLOR UR: YELLOW
CREAT SERPL-MCNC: 3.8 MG/DL (ref 0.8–1.3)
CREAT SERPL-MCNC: 4 MG/DL (ref 0.8–1.3)
CREAT SERPL-MCNC: 4.1 MG/DL (ref 0.8–1.3)
CREAT SERPL-MCNC: 4.1 MG/DL (ref 0.8–1.3)
CREAT UR-MCNC: 106.4 MG/DL (ref 39–259)
CREAT UR-MCNC: 55.5 MG/DL (ref 39–259)
CRYSTALS URNS MICRO: ABNORMAL /HPF
DEPRECATED RDW RBC AUTO: 16.4 % (ref 12.4–15.4)
DEPRECATED RDW RBC AUTO: 16.4 % (ref 12.4–15.4)
EOSINOPHIL # BLD: 0.3 K/UL (ref 0–0.6)
EOSINOPHIL NFR BLD: 1.6 %
EPI CELLS #/AREA URNS AUTO: 12 /HPF (ref 0–5)
FLUAV RNA UPPER RESP QL NAA+PROBE: NEGATIVE
FLUBV AG NPH QL: NEGATIVE
GFR SERPLBLD CREATININE-BSD FMLA CKD-EPI: 14 ML/MIN/{1.73_M2}
GFR SERPLBLD CREATININE-BSD FMLA CKD-EPI: 15 ML/MIN/{1.73_M2}
GLUCOSE SERPL-MCNC: 107 MG/DL (ref 70–99)
GLUCOSE SERPL-MCNC: 117 MG/DL (ref 70–99)
GLUCOSE SERPL-MCNC: 145 MG/DL (ref 70–99)
GLUCOSE SERPL-MCNC: 154 MG/DL (ref 70–99)
GLUCOSE UR STRIP.AUTO-MCNC: NEGATIVE MG/DL
HCT VFR BLD AUTO: 26.1 % (ref 40.5–52.5)
HCT VFR BLD AUTO: 27.1 % (ref 40.5–52.5)
HGB BLD-MCNC: 8 G/DL (ref 13.5–17.5)
HGB BLD-MCNC: 8.1 G/DL (ref 13.5–17.5)
HGB UR QL STRIP.AUTO: ABNORMAL
KETONES UR STRIP.AUTO-MCNC: NEGATIVE MG/DL
LACTATE BLDV-SCNC: 1.8 MMOL/L (ref 0.4–2)
LACTATE BLDV-SCNC: 2.1 MMOL/L (ref 0.4–1.9)
LACTATE BLDV-SCNC: 2.1 MMOL/L (ref 0.4–1.9)
LEUKOCYTE ESTERASE UR QL STRIP.AUTO: ABNORMAL
LYMPHOCYTES # BLD: 1.6 K/UL (ref 1–5.1)
LYMPHOCYTES NFR BLD: 8.6 %
MAGNESIUM SERPL-MCNC: 2.3 MG/DL (ref 1.8–2.4)
MCH RBC QN AUTO: 24.3 PG (ref 26–34)
MCH RBC QN AUTO: 24.8 PG (ref 26–34)
MCHC RBC AUTO-ENTMCNC: 29.7 G/DL (ref 31–36)
MCHC RBC AUTO-ENTMCNC: 30.9 G/DL (ref 31–36)
MCV RBC AUTO: 80.3 FL (ref 80–100)
MCV RBC AUTO: 81.9 FL (ref 80–100)
MONOCYTES # BLD: 0.8 K/UL (ref 0–1.3)
MONOCYTES NFR BLD: 4.2 %
MRSA DNA SPEC QL NAA+PROBE: NORMAL
MUCOUS THREADS #/AREA URNS LPF: ABNORMAL /LPF
NEUTROPHILS # BLD: 15.7 K/UL (ref 1.7–7.7)
NEUTROPHILS NFR BLD: 84.7 %
NITRITE UR QL STRIP.AUTO: NEGATIVE
OSMOLALITY SERPL: 385 MOSM/KG (ref 280–301)
OSMOLALITY UR: 374 MOSM/KG (ref 390–1070)
PH UR STRIP.AUTO: 6 [PH] (ref 5–8)
PHOSPHATE SERPL-MCNC: 4.5 MG/DL (ref 2.5–4.9)
PHOSPHATE SERPL-MCNC: 4.6 MG/DL (ref 2.5–4.9)
PLATELET # BLD AUTO: 210 K/UL (ref 135–450)
PLATELET # BLD AUTO: 238 K/UL (ref 135–450)
PMV BLD AUTO: 10.3 FL (ref 5–10.5)
PMV BLD AUTO: 10.6 FL (ref 5–10.5)
POTASSIUM SERPL-SCNC: 3.7 MMOL/L (ref 3.5–5.1)
POTASSIUM SERPL-SCNC: 4 MMOL/L (ref 3.5–5.1)
POTASSIUM SERPL-SCNC: 4.3 MMOL/L (ref 3.5–5.1)
POTASSIUM SERPL-SCNC: 4.3 MMOL/L (ref 3.5–5.1)
PROCALCITONIN SERPL IA-MCNC: 9.1 NG/ML (ref 0–0.15)
PROT SERPL-MCNC: 7 G/DL (ref 6.4–8.2)
PROT UR STRIP.AUTO-MCNC: 300 MG/DL
PROT UR-MCNC: 318 MG/DL
PROT/CREAT UR-RTO: 3 MG/DL
RBC # BLD AUTO: 3.25 M/UL (ref 4.2–5.9)
RBC # BLD AUTO: 3.31 M/UL (ref 4.2–5.9)
RBC CLUMPS #/AREA URNS AUTO: 24 /HPF (ref 0–4)
REASON FOR REJECTION: NORMAL
REJECTED TEST: NORMAL
REPORT: NORMAL
SARS-COV-2 RDRP RESP QL NAA+PROBE: NOT DETECTED
SODIUM SERPL-SCNC: 158 MMOL/L (ref 136–145)
SODIUM SERPL-SCNC: 161 MMOL/L (ref 136–145)
SODIUM SERPL-SCNC: 165 MMOL/L (ref 136–145)
SODIUM SERPL-SCNC: 167 MMOL/L (ref 136–145)
SODIUM SERPL-SCNC: 167 MMOL/L (ref 136–145)
SODIUM SERPL-SCNC: 169 MMOL/L (ref 136–145)
SODIUM UR-SCNC: 70 MMOL/L
SP GR UR STRIP.AUTO: 1.01 (ref 1–1.03)
UA COMPLETE W REFLEX CULTURE PNL UR: YES
UA DIPSTICK W REFLEX MICRO PNL UR: YES
URN SPEC COLLECT METH UR: ABNORMAL
UROBILINOGEN UR STRIP-ACNC: 1 E.U./DL
VANCOMYCIN SERPL-MCNC: 19.5 UG/ML
WBC # BLD AUTO: 18.5 K/UL (ref 4–11)
WBC # BLD AUTO: 19.6 K/UL (ref 4–11)
WBC #/AREA URNS AUTO: 3892 /HPF (ref 0–5)

## 2024-05-15 PROCEDURE — 2060000000 HC ICU INTERMEDIATE R&B

## 2024-05-15 PROCEDURE — 87641 MR-STAPH DNA AMP PROBE: CPT

## 2024-05-15 PROCEDURE — 85027 COMPLETE CBC AUTOMATED: CPT

## 2024-05-15 PROCEDURE — 87804 INFLUENZA ASSAY W/OPTIC: CPT

## 2024-05-15 PROCEDURE — 87086 URINE CULTURE/COLONY COUNT: CPT

## 2024-05-15 PROCEDURE — 83605 ASSAY OF LACTIC ACID: CPT

## 2024-05-15 PROCEDURE — 83930 ASSAY OF BLOOD OSMOLALITY: CPT

## 2024-05-15 PROCEDURE — 87077 CULTURE AEROBIC IDENTIFY: CPT

## 2024-05-15 PROCEDURE — 87635 SARS-COV-2 COVID-19 AMP PRB: CPT

## 2024-05-15 PROCEDURE — 74176 CT ABD & PELVIS W/O CONTRAST: CPT

## 2024-05-15 PROCEDURE — 87040 BLOOD CULTURE FOR BACTERIA: CPT

## 2024-05-15 PROCEDURE — 96360 HYDRATION IV INFUSION INIT: CPT

## 2024-05-15 PROCEDURE — 6360000002 HC RX W HCPCS: Performed by: PHYSICIAN ASSISTANT

## 2024-05-15 PROCEDURE — 84295 ASSAY OF SERUM SODIUM: CPT

## 2024-05-15 PROCEDURE — 83735 ASSAY OF MAGNESIUM: CPT

## 2024-05-15 PROCEDURE — 6370000000 HC RX 637 (ALT 250 FOR IP): Performed by: PHYSICIAN ASSISTANT

## 2024-05-15 PROCEDURE — 96365 THER/PROPH/DIAG IV INF INIT: CPT

## 2024-05-15 PROCEDURE — 84100 ASSAY OF PHOSPHORUS: CPT

## 2024-05-15 PROCEDURE — 84300 ASSAY OF URINE SODIUM: CPT

## 2024-05-15 PROCEDURE — 6370000000 HC RX 637 (ALT 250 FOR IP): Performed by: STUDENT IN AN ORGANIZED HEALTH CARE EDUCATION/TRAINING PROGRAM

## 2024-05-15 PROCEDURE — 94760 N-INVAS EAR/PLS OXIMETRY 1: CPT

## 2024-05-15 PROCEDURE — 82436 ASSAY OF URINE CHLORIDE: CPT

## 2024-05-15 PROCEDURE — 84145 PROCALCITONIN (PCT): CPT

## 2024-05-15 PROCEDURE — 80202 ASSAY OF VANCOMYCIN: CPT

## 2024-05-15 PROCEDURE — 80053 COMPREHEN METABOLIC PANEL: CPT

## 2024-05-15 PROCEDURE — 2580000003 HC RX 258: Performed by: HOSPITALIST

## 2024-05-15 PROCEDURE — 6360000002 HC RX W HCPCS: Performed by: STUDENT IN AN ORGANIZED HEALTH CARE EDUCATION/TRAINING PROGRAM

## 2024-05-15 PROCEDURE — 96361 HYDRATE IV INFUSION ADD-ON: CPT

## 2024-05-15 PROCEDURE — 36415 COLL VENOUS BLD VENIPUNCTURE: CPT

## 2024-05-15 PROCEDURE — 81001 URINALYSIS AUTO W/SCOPE: CPT

## 2024-05-15 PROCEDURE — 2580000003 HC RX 258: Performed by: STUDENT IN AN ORGANIZED HEALTH CARE EDUCATION/TRAINING PROGRAM

## 2024-05-15 PROCEDURE — 6360000002 HC RX W HCPCS: Performed by: HOSPITALIST

## 2024-05-15 PROCEDURE — 2580000003 HC RX 258: Performed by: PHYSICIAN ASSISTANT

## 2024-05-15 PROCEDURE — 83935 ASSAY OF URINE OSMOLALITY: CPT

## 2024-05-15 PROCEDURE — 82570 ASSAY OF URINE CREATININE: CPT

## 2024-05-15 PROCEDURE — 84156 ASSAY OF PROTEIN URINE: CPT

## 2024-05-15 PROCEDURE — 87186 SC STD MICRODIL/AGAR DIL: CPT

## 2024-05-15 RX ORDER — SODIUM CHLORIDE 0.9 % (FLUSH) 0.9 %
5-40 SYRINGE (ML) INJECTION PRN
Status: DISCONTINUED | OUTPATIENT
Start: 2024-05-15 | End: 2024-05-19 | Stop reason: SDUPTHER

## 2024-05-15 RX ORDER — DEXTROSE MONOHYDRATE 50 MG/ML
INJECTION, SOLUTION INTRAVENOUS CONTINUOUS
Status: DISCONTINUED | OUTPATIENT
Start: 2024-05-15 | End: 2024-05-21

## 2024-05-15 RX ORDER — NIFEDIPINE 30 MG/1
30 TABLET, EXTENDED RELEASE ORAL DAILY
Status: DISCONTINUED | OUTPATIENT
Start: 2024-05-15 | End: 2024-05-22 | Stop reason: HOSPADM

## 2024-05-15 RX ORDER — ENOXAPARIN SODIUM 100 MG/ML
30 INJECTION SUBCUTANEOUS DAILY
Status: DISCONTINUED | OUTPATIENT
Start: 2024-05-15 | End: 2024-05-22 | Stop reason: HOSPADM

## 2024-05-15 RX ORDER — SENNOSIDES A AND B 8.6 MG/1
1 TABLET, FILM COATED ORAL
Status: DISCONTINUED | OUTPATIENT
Start: 2024-05-15 | End: 2024-05-22 | Stop reason: HOSPADM

## 2024-05-15 RX ORDER — SODIUM CHLORIDE 9 MG/ML
INJECTION, SOLUTION INTRAVENOUS PRN
Status: DISCONTINUED | OUTPATIENT
Start: 2024-05-15 | End: 2024-05-22 | Stop reason: HOSPADM

## 2024-05-15 RX ORDER — ACETAMINOPHEN 325 MG/1
650 TABLET ORAL EVERY 6 HOURS PRN
Status: DISCONTINUED | OUTPATIENT
Start: 2024-05-15 | End: 2024-05-22 | Stop reason: HOSPADM

## 2024-05-15 RX ORDER — ACETAMINOPHEN 650 MG/1
650 SUPPOSITORY RECTAL EVERY 6 HOURS PRN
Status: DISCONTINUED | OUTPATIENT
Start: 2024-05-15 | End: 2024-05-22 | Stop reason: HOSPADM

## 2024-05-15 RX ORDER — POLYETHYLENE GLYCOL 3350 17 G/17G
17 POWDER, FOR SOLUTION ORAL DAILY PRN
Status: DISCONTINUED | OUTPATIENT
Start: 2024-05-15 | End: 2024-05-15

## 2024-05-15 RX ORDER — ONDANSETRON 4 MG/1
4 TABLET, ORALLY DISINTEGRATING ORAL EVERY 8 HOURS PRN
Status: DISCONTINUED | OUTPATIENT
Start: 2024-05-15 | End: 2024-05-22 | Stop reason: HOSPADM

## 2024-05-15 RX ORDER — TAMSULOSIN HYDROCHLORIDE 0.4 MG/1
0.4 CAPSULE ORAL DAILY
Status: DISCONTINUED | OUTPATIENT
Start: 2024-05-15 | End: 2024-05-22 | Stop reason: HOSPADM

## 2024-05-15 RX ORDER — FINASTERIDE 5 MG/1
5 TABLET, FILM COATED ORAL DAILY
Status: DISCONTINUED | OUTPATIENT
Start: 2024-05-15 | End: 2024-05-22 | Stop reason: HOSPADM

## 2024-05-15 RX ORDER — SODIUM CHLORIDE 0.9 % (FLUSH) 0.9 %
5-40 SYRINGE (ML) INJECTION EVERY 12 HOURS SCHEDULED
Status: DISCONTINUED | OUTPATIENT
Start: 2024-05-15 | End: 2024-05-19 | Stop reason: SDUPTHER

## 2024-05-15 RX ORDER — HYDRALAZINE HYDROCHLORIDE 10 MG/1
10 TABLET, FILM COATED ORAL 2 TIMES DAILY
Status: DISCONTINUED | OUTPATIENT
Start: 2024-05-15 | End: 2024-05-22 | Stop reason: HOSPADM

## 2024-05-15 RX ORDER — SODIUM CHLORIDE, SODIUM LACTATE, POTASSIUM CHLORIDE, CALCIUM CHLORIDE 600; 310; 30; 20 MG/100ML; MG/100ML; MG/100ML; MG/100ML
INJECTION, SOLUTION INTRAVENOUS CONTINUOUS
Status: DISCONTINUED | OUTPATIENT
Start: 2024-05-15 | End: 2024-05-15

## 2024-05-15 RX ORDER — ONDANSETRON 2 MG/ML
4 INJECTION INTRAMUSCULAR; INTRAVENOUS EVERY 6 HOURS PRN
Status: DISCONTINUED | OUTPATIENT
Start: 2024-05-15 | End: 2024-05-22 | Stop reason: HOSPADM

## 2024-05-15 RX ORDER — POLYETHYLENE GLYCOL 3350 17 G/17G
17 POWDER, FOR SOLUTION ORAL DAILY PRN
Status: DISCONTINUED | OUTPATIENT
Start: 2024-05-15 | End: 2024-05-22 | Stop reason: HOSPADM

## 2024-05-15 RX ORDER — SODIUM CHLORIDE 450 MG/100ML
INJECTION, SOLUTION INTRAVENOUS CONTINUOUS
Status: DISCONTINUED | OUTPATIENT
Start: 2024-05-15 | End: 2024-05-15

## 2024-05-15 RX ADMIN — METOPROLOL TARTRATE 25 MG: 25 TABLET, FILM COATED ORAL at 08:19

## 2024-05-15 RX ADMIN — TAMSULOSIN HYDROCHLORIDE 0.4 MG: 0.4 CAPSULE ORAL at 08:19

## 2024-05-15 RX ADMIN — DEXTROSE MONOHYDRATE: 50 INJECTION, SOLUTION INTRAVENOUS at 08:39

## 2024-05-15 RX ADMIN — SENNOSIDES 8.6 MG: 8.6 TABLET, FILM COATED ORAL at 21:35

## 2024-05-15 RX ADMIN — MEROPENEM 1000 MG: 1 INJECTION, POWDER, FOR SOLUTION INTRAVENOUS at 20:49

## 2024-05-15 RX ADMIN — HYDRALAZINE HYDROCHLORIDE 10 MG: 10 TABLET, FILM COATED ORAL at 21:35

## 2024-05-15 RX ADMIN — HYDRALAZINE HYDROCHLORIDE 10 MG: 10 TABLET, FILM COATED ORAL at 08:19

## 2024-05-15 RX ADMIN — SODIUM CHLORIDE 1000 ML: 4.5 INJECTION, SOLUTION INTRAVENOUS at 02:32

## 2024-05-15 RX ADMIN — VANCOMYCIN HYDROCHLORIDE 1500 MG: 1.5 INJECTION, POWDER, LYOPHILIZED, FOR SOLUTION INTRAVENOUS at 03:20

## 2024-05-15 RX ADMIN — CEFEPIME 2000 MG: 2 INJECTION, POWDER, FOR SOLUTION INTRAVENOUS at 02:36

## 2024-05-15 RX ADMIN — SODIUM CHLORIDE 500 ML: 9 INJECTION, SOLUTION INTRAVENOUS at 00:02

## 2024-05-15 RX ADMIN — CEFEPIME 1000 MG: 1 INJECTION, POWDER, FOR SOLUTION INTRAMUSCULAR; INTRAVENOUS at 08:32

## 2024-05-15 RX ADMIN — SODIUM CHLORIDE, PRESERVATIVE FREE 10 ML: 5 INJECTION INTRAVENOUS at 21:36

## 2024-05-15 RX ADMIN — FINASTERIDE 5 MG: 5 TABLET, FILM COATED ORAL at 08:20

## 2024-05-15 RX ADMIN — METOPROLOL TARTRATE 25 MG: 25 TABLET, FILM COATED ORAL at 21:36

## 2024-05-15 RX ADMIN — ACETAMINOPHEN 650 MG: 325 TABLET ORAL at 00:06

## 2024-05-15 RX ADMIN — ENOXAPARIN SODIUM 30 MG: 100 INJECTION SUBCUTANEOUS at 08:20

## 2024-05-15 RX ADMIN — DEXTROSE MONOHYDRATE: 50 INJECTION, SOLUTION INTRAVENOUS at 23:52

## 2024-05-15 RX ADMIN — NIFEDIPINE 30 MG: 30 TABLET, EXTENDED RELEASE ORAL at 08:19

## 2024-05-15 ASSESSMENT — LIFESTYLE VARIABLES
HOW OFTEN DO YOU HAVE A DRINK CONTAINING ALCOHOL: PATIENT UNABLE TO ANSWER
HOW MANY STANDARD DRINKS CONTAINING ALCOHOL DO YOU HAVE ON A TYPICAL DAY: PATIENT UNABLE TO ANSWER

## 2024-05-15 ASSESSMENT — PAIN SCALES - GENERAL: PAINLEVEL_OUTOF10: 0

## 2024-05-15 NOTE — ED NOTES
Pt repositioned by this RN. Leonora, and brief changed. Pillow placed under bilat heels to prevent pressure sores. Pt tolerated well, and compliant with repositioning

## 2024-05-15 NOTE — ED NOTES
Pt placed into gown. Cath care, and derick care completed. Pt with dime size wound to coccyx that was present on arrival. Barrier cream applied, pillow placed under L hip to assist in pressure relief. Pt tolerated well. Remains alert to self at this time

## 2024-05-15 NOTE — ED PROVIDER NOTES
[facility] EMERGENCY DEPARTMENT  EMERGENCY DEPARTMENT ENCOUNTER        Pt Name: Reji Kaufman  MRN: [id number]  Birthdate 1940  Date of evaluation: 5/14/2024  Provider: KAREN Kearney  PCP: Luis Felipe Jackson MD  Note Started: 2:44 AM EDT 5/15/24      MELISSA. I have evaluated this patient.        CHIEF COMPLAINT       Chief Complaint   Patient presents with    Altered Mental Status     Patient presents from [facility] for altered mental status. Patient has a history of vascular dementia, but nursing home states he has been acting \"not like himself since lunch time.\" Patent oriented x2 in triage. Disoriented to place and time. Patient denies any pain. Patient arrives with catheter in place. Fever and tachycardia present in triage.        HISTORY OF PRESENT ILLNESS: 1 or more Elements     History from : Patient and nursing home/EMS staff    Limitations to history : Altered Mental Status in setting of baseline dementia    Reji Kaufman is a 83 y.o. male who presents via EMS from [facility].  They report some increased confusion today.  He has vascular dementia at baseline.  They have done some lab work and noted his sodium to be elevated so sent him to the ED.  He was admitted to our hospital last month with an EVELINA and sepsis with some increased confusion at that time.  He has a known history of prostate cancer and has an indwelling Rose at this time.  The patient denies complaints.  He is oriented to person and place but not time.  He denies abdominal pain or chest pain.    Nursing Notes were all reviewed and agreed with or any disagreements were addressed in the HPI.    REVIEW OF SYSTEMS :      Review of Systems   Constitutional:  Positive for activity change, appetite change and fever.   Respiratory:  Negative for cough.    Gastrointestinal:  Negative for vomiting.   Skin:  Positive for wound (sacral wound).   Psychiatric/Behavioral:  Positive for confusion.

## 2024-05-15 NOTE — CARE COORDINATION
05/15/24 1128   Readmission Assessment   Number of Days since last admission? 8-30 days   Previous Disposition SNF  ([facility])   Who is being Interviewed Caregiver   What was the patient's/caregiver's perception as to why they think they needed to return back to the hospital? Other (Comment)  (altered mental status)   Did you visit your Primary Care Physician after you left the hospital, before you returned this time? Yes  (MD/NP at SNF)   Did you see a specialist, such as Cardiac, Pulmonary, Orthopedic Physician, etc. after you left the hospital? Yes  (urology)   Who advised the patient to return to the hospital? Skilled Unit   Does the patient report anything that got in the way of taking their medications? No   In our efforts to provide the best possible care to you and others like you, can you think of anything that we could have done to help you after you left the hospital the first time, so that you might not have needed to return so soon? Other (Comment)  (no)     #[phone]  Electronically signed by Karina Purvis RN on 5/15/2024 at 11:30 AM

## 2024-05-15 NOTE — ACP (ADVANCE CARE PLANNING)
Advance Care Planning     Advance Care Planning Activator (Inpatient)  Conversation Note      Date of ACP Conversation: 5/15/2024     Conversation Conducted with: Legal next of kin    ACP Activator: Karina Purvis RN    Health Care Decision Maker:     Current Designated Health Care Decision Maker:     Primary Decision Maker: Sanjay Kaufman - Child - [phone]    Secondary Decision Maker: Kenn Kaufman - Child - [phone]    Today we documented Decision Maker(s) consistent with Legal Next of Kin hierarchy.    Care Preferences    Ventilation:  \"If you were in your present state of health and suddenly became very ill and were unable to breathe on your own, what would your preference be about the use of a ventilator (breathing machine) if it were available to you?\"      Would the patient desire the use of ventilator (breathing machine)?: yes    \"If your health worsens and it becomes clear that your chance of recovery is unlikely, what would your preference be about the use of a ventilator (breathing machine) if it were available to you?\"     Would the patient desire the use of ventilator (breathing machine)?: Yes      Resuscitation  \"CPR works best to restart the heart when there is a sudden event, like a heart attack, in someone who is otherwise healthy. Unfortunately, CPR does not typically restart the heart for people who have serious health conditions or who are very sick.\"    \"In the event your heart stopped as a result of an underlying serious health condition, would you want attempts to be made to restart your heart (answer \"yes\" for attempt to resuscitate) or would you prefer a natural death (answer \"no\" for do not attempt to resuscitate)?\" yes       [] Yes   [] No   Educated Patient / Decision Maker regarding differences between Advance Directives and portable DNR orders.    Length of ACP Conversation in minutes:      Conversation Outcomes:  ACP discussion completed    Follow-up plan:    [] Schedule

## 2024-05-15 NOTE — CARE COORDINATION
Case Management Assessment  Initial Evaluation    Date/Time of Evaluation: 5/15/2024 11:38 AM  Assessment Completed by: Karina Purvis RN    If patient is discharged prior to next notation, then this note serves as note for discharge by case management.    Patient Name: Reji Kaufman                   YOB: 1940  Diagnosis: Hypernatremia [E87.0]  UTI (urinary tract infection) [N39.0]  Septicemia (HCC) [A41.9]  EVELINA (acute kidney injury) ([facility]) [N17.9]  Altered mental status, unspecified altered mental status type [R41.82]  Pneumonia due to infectious organism, unspecified laterality, unspecified part of lung [J18.9]  Urinary tract infection associated with indwelling urethral catheter, initial encounter ([facility]) [T83.511A, N39.0]                   Date / Time: 5/14/2024 10:38 PM    Patient Admission Status: Inpatient   Readmission Risk (Low < 19, Mod (19-27), High > 27): Readmission Risk Score: 21.8    Current PCP: Luis Felipe Jackson MD  PCP verified by CM? Yes    Chart Reviewed: Yes      History Provided by: Other (see comment), Medical Record, Child/Family ([place])  Patient Orientation: Alert and Oriented    Patient Cognition: Dementia / Early Alzheimer's    Hospitalization in the last 30 days (Readmission):  Yes    If yes, Readmission Assessment in  Navigator will be completed.    Advance Directives:      Code Status: Full Code   Patient's Primary Decision Maker is: Legal Next of Kin    Primary Decision Maker: MandeepSanjay - Child - [phone]    Secondary Decision Maker: Kenn Kaufman - Child - [phone]    Discharge Planning:    Patient lives with: Other (Comment) ([facility]) Type of Home: Long-Term Care  Primary Care Giver: Other (Comment) (ECF)  Patient Support Systems include: Children, Family Members   Current Financial resources: Medicare, Medicaid  Current community resources: ECF/Home Care  Current services prior to admission: Extended Care Facility            Current DME:

## 2024-05-16 PROBLEM — C79.82 METASTATIC ADENOCARCINOMA TO PROSTATE (HCC): Status: ACTIVE | Noted: 2024-05-16

## 2024-05-16 PROBLEM — R41.82 ALTERED MENTAL STATUS: Status: ACTIVE | Noted: 2024-05-16

## 2024-05-16 PROBLEM — E87.20 LACTIC ACID ACIDOSIS: Status: ACTIVE | Noted: 2024-05-16

## 2024-05-16 PROBLEM — R65.20 SEPSIS WITH ACUTE RENAL FAILURE AND TUBULAR NECROSIS WITHOUT SEPTIC SHOCK (HCC): Status: ACTIVE | Noted: 2024-05-16

## 2024-05-16 PROBLEM — A49.9 ESBL (EXTENDED SPECTRUM BETA-LACTAMASE) PRODUCING BACTERIA INFECTION: Status: ACTIVE | Noted: 2024-05-16

## 2024-05-16 PROBLEM — A41.9 SEPSIS WITH ACUTE RENAL FAILURE AND TUBULAR NECROSIS WITHOUT SEPTIC SHOCK (HCC): Status: ACTIVE | Noted: 2024-05-16

## 2024-05-16 PROBLEM — E87.0 HYPERNATREMIA: Status: ACTIVE | Noted: 2024-05-16

## 2024-05-16 PROBLEM — Z16.12 ESBL (EXTENDED SPECTRUM BETA-LACTAMASE) PRODUCING BACTERIA INFECTION: Status: ACTIVE | Noted: 2024-05-16

## 2024-05-16 PROBLEM — A41.9 SEPTICEMIA (HCC): Status: ACTIVE | Noted: 2024-05-16

## 2024-05-16 PROBLEM — N17.0 SEPSIS WITH ACUTE RENAL FAILURE AND TUBULAR NECROSIS WITHOUT SEPTIC SHOCK (HCC): Status: ACTIVE | Noted: 2024-05-16

## 2024-05-16 LAB
ABO + RH BLD: NORMAL
ALBUMIN SERPL-MCNC: 2.3 G/DL (ref 3.4–5)
ANION GAP SERPL CALCULATED.3IONS-SCNC: 11 MMOL/L (ref 3–16)
BACTERIA UR CULT: NORMAL
BASOPHILS # BLD: 0.1 K/UL (ref 0–0.2)
BASOPHILS NFR BLD: 0.4 %
BLD GP AB SCN SERPL QL: NORMAL
BLOOD BANK DISPENSE STATUS: NORMAL
BLOOD BANK PRODUCT CODE: NORMAL
BPU ID: NORMAL
BUN SERPL-MCNC: 59 MG/DL (ref 7–20)
CALCIUM SERPL-MCNC: 8.1 MG/DL (ref 8.3–10.6)
CHLORIDE SERPL-SCNC: 121 MMOL/L (ref 99–110)
CO2 SERPL-SCNC: 24 MMOL/L (ref 21–32)
CREAT SERPL-MCNC: 3.4 MG/DL (ref 0.8–1.3)
DEPRECATED RDW RBC AUTO: 16.3 % (ref 12.4–15.4)
DEPRECATED RDW RBC AUTO: 16.3 % (ref 12.4–15.4)
DESCRIPTION BLOOD BANK: NORMAL
EOSINOPHIL # BLD: 0.4 K/UL (ref 0–0.6)
EOSINOPHIL NFR BLD: 1.9 %
FERRITIN SERPL IA-MCNC: 677.1 NG/ML (ref 30–400)
FOLATE SERPL-MCNC: 5.23 NG/ML (ref 4.78–24.2)
GFR SERPLBLD CREATININE-BSD FMLA CKD-EPI: 17 ML/MIN/{1.73_M2}
GLUCOSE SERPL-MCNC: 123 MG/DL (ref 70–99)
HCT VFR BLD AUTO: 21.6 % (ref 40.5–52.5)
HCT VFR BLD AUTO: 22.5 % (ref 40.5–52.5)
HCT VFR BLD AUTO: 27.2 % (ref 40.5–52.5)
HGB BLD-MCNC: 6.7 G/DL (ref 13.5–17.5)
HGB BLD-MCNC: 6.9 G/DL (ref 13.5–17.5)
HGB BLD-MCNC: 8.2 G/DL (ref 13.5–17.5)
IRON SATN MFR SERPL: 24 % (ref 20–50)
IRON SERPL-MCNC: 30 UG/DL (ref 59–158)
LYMPHOCYTES # BLD: 1.7 K/UL (ref 1–5.1)
LYMPHOCYTES NFR BLD: 8.5 %
MAGNESIUM SERPL-MCNC: 2 MG/DL (ref 1.8–2.4)
MCH RBC QN AUTO: 24.3 PG (ref 26–34)
MCH RBC QN AUTO: 24.3 PG (ref 26–34)
MCHC RBC AUTO-ENTMCNC: 30.4 G/DL (ref 31–36)
MCHC RBC AUTO-ENTMCNC: 30.9 G/DL (ref 31–36)
MCV RBC AUTO: 78.6 FL (ref 80–100)
MCV RBC AUTO: 79.8 FL (ref 80–100)
MONOCYTES # BLD: 0.8 K/UL (ref 0–1.3)
MONOCYTES NFR BLD: 4.1 %
NEUTROPHILS # BLD: 16.6 K/UL (ref 1.7–7.7)
NEUTROPHILS NFR BLD: 85.1 %
PHOSPHATE SERPL-MCNC: 2.5 MG/DL (ref 2.5–4.9)
PLATELET # BLD AUTO: 215 K/UL (ref 135–450)
PLATELET # BLD AUTO: 220 K/UL (ref 135–450)
PMV BLD AUTO: 10.7 FL (ref 5–10.5)
PMV BLD AUTO: 10.9 FL (ref 5–10.5)
POTASSIUM SERPL-SCNC: 3.3 MMOL/L (ref 3.5–5.1)
RBC # BLD AUTO: 2.75 M/UL (ref 4.2–5.9)
RBC # BLD AUTO: 2.83 M/UL (ref 4.2–5.9)
SODIUM SERPL-SCNC: 146 MMOL/L (ref 136–145)
SODIUM SERPL-SCNC: 147 MMOL/L (ref 136–145)
SODIUM SERPL-SCNC: 155 MMOL/L (ref 136–145)
SODIUM SERPL-SCNC: 155 MMOL/L (ref 136–145)
SODIUM SERPL-SCNC: 156 MMOL/L (ref 136–145)
SODIUM SERPL-SCNC: 156 MMOL/L (ref 136–145)
TIBC SERPL-MCNC: 127 UG/DL (ref 260–445)
TRANSFERRIN SERPL-MCNC: 106 MG/DL (ref 200–360)
VANCOMYCIN SERPL-MCNC: 14.9 UG/ML
VIT B12 SERPL-MCNC: >2000 PG/ML (ref 211–911)
WBC # BLD AUTO: 18.5 K/UL (ref 4–11)
WBC # BLD AUTO: 19.5 K/UL (ref 4–11)

## 2024-05-16 PROCEDURE — 30233N1 TRANSFUSION OF NONAUTOLOGOUS RED BLOOD CELLS INTO PERIPHERAL VEIN, PERCUTANEOUS APPROACH: ICD-10-PCS | Performed by: STUDENT IN AN ORGANIZED HEALTH CARE EDUCATION/TRAINING PROGRAM

## 2024-05-16 PROCEDURE — 82728 ASSAY OF FERRITIN: CPT

## 2024-05-16 PROCEDURE — 36415 COLL VENOUS BLD VENIPUNCTURE: CPT

## 2024-05-16 PROCEDURE — 6370000000 HC RX 637 (ALT 250 FOR IP)

## 2024-05-16 PROCEDURE — 6360000002 HC RX W HCPCS: Performed by: HOSPITALIST

## 2024-05-16 PROCEDURE — 83540 ASSAY OF IRON: CPT

## 2024-05-16 PROCEDURE — 36430 TRANSFUSION BLD/BLD COMPNT: CPT

## 2024-05-16 PROCEDURE — 86850 RBC ANTIBODY SCREEN: CPT

## 2024-05-16 PROCEDURE — 86923 COMPATIBILITY TEST ELECTRIC: CPT

## 2024-05-16 PROCEDURE — 86901 BLOOD TYPING SEROLOGIC RH(D): CPT

## 2024-05-16 PROCEDURE — 6370000000 HC RX 637 (ALT 250 FOR IP): Performed by: INTERNAL MEDICINE

## 2024-05-16 PROCEDURE — 2060000000 HC ICU INTERMEDIATE R&B

## 2024-05-16 PROCEDURE — 9990000010 HC NO CHARGE VISIT: Performed by: PHYSICAL THERAPIST

## 2024-05-16 PROCEDURE — 85025 COMPLETE CBC W/AUTO DIFF WBC: CPT

## 2024-05-16 PROCEDURE — 9990000010 HC NO CHARGE VISIT

## 2024-05-16 PROCEDURE — 99223 1ST HOSP IP/OBS HIGH 75: CPT | Performed by: INTERNAL MEDICINE

## 2024-05-16 PROCEDURE — 2580000003 HC RX 258: Performed by: HOSPITALIST

## 2024-05-16 PROCEDURE — 84295 ASSAY OF SERUM SODIUM: CPT

## 2024-05-16 PROCEDURE — 6370000000 HC RX 637 (ALT 250 FOR IP): Performed by: STUDENT IN AN ORGANIZED HEALTH CARE EDUCATION/TRAINING PROGRAM

## 2024-05-16 PROCEDURE — 6360000002 HC RX W HCPCS: Performed by: STUDENT IN AN ORGANIZED HEALTH CARE EDUCATION/TRAINING PROGRAM

## 2024-05-16 PROCEDURE — 80069 RENAL FUNCTION PANEL: CPT

## 2024-05-16 PROCEDURE — 84466 ASSAY OF TRANSFERRIN: CPT

## 2024-05-16 PROCEDURE — P9016 RBC LEUKOCYTES REDUCED: HCPCS

## 2024-05-16 PROCEDURE — 85018 HEMOGLOBIN: CPT

## 2024-05-16 PROCEDURE — 80202 ASSAY OF VANCOMYCIN: CPT

## 2024-05-16 PROCEDURE — 85027 COMPLETE CBC AUTOMATED: CPT

## 2024-05-16 PROCEDURE — 82607 VITAMIN B-12: CPT

## 2024-05-16 PROCEDURE — 82746 ASSAY OF FOLIC ACID SERUM: CPT

## 2024-05-16 PROCEDURE — 87040 BLOOD CULTURE FOR BACTERIA: CPT

## 2024-05-16 PROCEDURE — 85014 HEMATOCRIT: CPT

## 2024-05-16 PROCEDURE — 2580000003 HC RX 258: Performed by: STUDENT IN AN ORGANIZED HEALTH CARE EDUCATION/TRAINING PROGRAM

## 2024-05-16 PROCEDURE — 94760 N-INVAS EAR/PLS OXIMETRY 1: CPT

## 2024-05-16 PROCEDURE — 83735 ASSAY OF MAGNESIUM: CPT

## 2024-05-16 PROCEDURE — 86900 BLOOD TYPING SEROLOGIC ABO: CPT

## 2024-05-16 RX ORDER — SODIUM CHLORIDE 9 MG/ML
INJECTION, SOLUTION INTRAVENOUS PRN
Status: DISCONTINUED | OUTPATIENT
Start: 2024-05-16 | End: 2024-05-22 | Stop reason: HOSPADM

## 2024-05-16 RX ORDER — POTASSIUM CHLORIDE 20 MEQ/1
20 TABLET, EXTENDED RELEASE ORAL ONCE
Status: COMPLETED | OUTPATIENT
Start: 2024-05-16 | End: 2024-05-16

## 2024-05-16 RX ADMIN — HYDRALAZINE HYDROCHLORIDE 10 MG: 10 TABLET, FILM COATED ORAL at 10:33

## 2024-05-16 RX ADMIN — ENOXAPARIN SODIUM 30 MG: 100 INJECTION SUBCUTANEOUS at 11:03

## 2024-05-16 RX ADMIN — SODIUM CHLORIDE, PRESERVATIVE FREE 10 ML: 5 INJECTION INTRAVENOUS at 11:21

## 2024-05-16 RX ADMIN — NIFEDIPINE 30 MG: 30 TABLET, EXTENDED RELEASE ORAL at 10:33

## 2024-05-16 RX ADMIN — DEXTROSE MONOHYDRATE: 50 INJECTION, SOLUTION INTRAVENOUS at 07:47

## 2024-05-16 RX ADMIN — POTASSIUM CHLORIDE 20 MEQ: 1500 TABLET, EXTENDED RELEASE ORAL at 15:56

## 2024-05-16 RX ADMIN — SENNOSIDES 8.6 MG: 8.6 TABLET, FILM COATED ORAL at 21:53

## 2024-05-16 RX ADMIN — MEROPENEM 500 MG: 500 INJECTION, POWDER, FOR SOLUTION INTRAVENOUS at 10:43

## 2024-05-16 RX ADMIN — METOPROLOL TARTRATE 25 MG: 25 TABLET, FILM COATED ORAL at 10:33

## 2024-05-16 RX ADMIN — HYDRALAZINE HYDROCHLORIDE 10 MG: 10 TABLET, FILM COATED ORAL at 21:53

## 2024-05-16 RX ADMIN — TAMSULOSIN HYDROCHLORIDE 0.4 MG: 0.4 CAPSULE ORAL at 10:33

## 2024-05-16 RX ADMIN — DEXTROSE MONOHYDRATE: 50 INJECTION, SOLUTION INTRAVENOUS at 16:54

## 2024-05-16 RX ADMIN — MEROPENEM 500 MG: 500 INJECTION, POWDER, FOR SOLUTION INTRAVENOUS at 21:54

## 2024-05-16 RX ADMIN — SODIUM CHLORIDE: 9 INJECTION, SOLUTION INTRAVENOUS at 10:42

## 2024-05-16 RX ADMIN — FINASTERIDE 5 MG: 5 TABLET, FILM COATED ORAL at 10:33

## 2024-05-16 ASSESSMENT — PAIN SCALES - PAIN ASSESSMENT IN ADVANCED DEMENTIA (PAINAD)
TOTALSCORE: 0
BREATHING: NORMAL
FACIALEXPRESSION: SMILING OR INEXPRESSIVE
BODYLANGUAGE: RELAXED
CONSOLABILITY: NO NEED TO CONSOLE

## 2024-05-17 ENCOUNTER — APPOINTMENT (OUTPATIENT)
Dept: GENERAL RADIOLOGY | Age: 84
DRG: 698 | End: 2024-05-17
Payer: MEDICARE

## 2024-05-17 LAB
ALBUMIN SERPL-MCNC: 2 G/DL (ref 3.4–5)
ANION GAP SERPL CALCULATED.3IONS-SCNC: 12 MMOL/L (ref 3–16)
BACTERIA BLD CULT ORG #2: ABNORMAL
BACTERIA BLD CULT: ABNORMAL
BACTERIA BLD CULT: ABNORMAL
BUN SERPL-MCNC: 57 MG/DL (ref 7–20)
CALCIUM SERPL-MCNC: 7.9 MG/DL (ref 8.3–10.6)
CHLORIDE SERPL-SCNC: 114 MMOL/L (ref 99–110)
CO2 SERPL-SCNC: 21 MMOL/L (ref 21–32)
CREAT SERPL-MCNC: 2.9 MG/DL (ref 0.8–1.3)
DEPRECATED RDW RBC AUTO: 16.3 % (ref 12.4–15.4)
GFR SERPLBLD CREATININE-BSD FMLA CKD-EPI: 21 ML/MIN/{1.73_M2}
GLUCOSE SERPL-MCNC: 118 MG/DL (ref 70–99)
HCT VFR BLD AUTO: 23.1 % (ref 40.5–52.5)
HGB BLD-MCNC: 7.4 G/DL (ref 13.5–17.5)
MAGNESIUM SERPL-MCNC: 1.8 MG/DL (ref 1.8–2.4)
MCH RBC QN AUTO: 25.1 PG (ref 26–34)
MCHC RBC AUTO-ENTMCNC: 31.9 G/DL (ref 31–36)
MCV RBC AUTO: 78.5 FL (ref 80–100)
ORGANISM: ABNORMAL
PHOSPHATE SERPL-MCNC: 2.6 MG/DL (ref 2.5–4.9)
PLATELET # BLD AUTO: 190 K/UL (ref 135–450)
PMV BLD AUTO: 10.1 FL (ref 5–10.5)
POTASSIUM SERPL-SCNC: 3.4 MMOL/L (ref 3.5–5.1)
RBC # BLD AUTO: 2.94 M/UL (ref 4.2–5.9)
SODIUM SERPL-SCNC: 143 MMOL/L (ref 136–145)
SODIUM SERPL-SCNC: 144 MMOL/L (ref 136–145)
SODIUM SERPL-SCNC: 145 MMOL/L (ref 136–145)
SODIUM SERPL-SCNC: 147 MMOL/L (ref 136–145)
SODIUM SERPL-SCNC: 147 MMOL/L (ref 136–145)
WBC # BLD AUTO: 13 K/UL (ref 4–11)

## 2024-05-17 PROCEDURE — 2060000000 HC ICU INTERMEDIATE R&B

## 2024-05-17 PROCEDURE — 85027 COMPLETE CBC AUTOMATED: CPT

## 2024-05-17 PROCEDURE — 99233 SBSQ HOSP IP/OBS HIGH 50: CPT | Performed by: INTERNAL MEDICINE

## 2024-05-17 PROCEDURE — 36415 COLL VENOUS BLD VENIPUNCTURE: CPT

## 2024-05-17 PROCEDURE — 84295 ASSAY OF SERUM SODIUM: CPT

## 2024-05-17 PROCEDURE — 80069 RENAL FUNCTION PANEL: CPT

## 2024-05-17 PROCEDURE — 6370000000 HC RX 637 (ALT 250 FOR IP)

## 2024-05-17 PROCEDURE — 6360000002 HC RX W HCPCS: Performed by: HOSPITALIST

## 2024-05-17 PROCEDURE — 83735 ASSAY OF MAGNESIUM: CPT

## 2024-05-17 PROCEDURE — 97166 OT EVAL MOD COMPLEX 45 MIN: CPT

## 2024-05-17 PROCEDURE — 94760 N-INVAS EAR/PLS OXIMETRY 1: CPT

## 2024-05-17 PROCEDURE — 73502 X-RAY EXAM HIP UNI 2-3 VIEWS: CPT

## 2024-05-17 PROCEDURE — 6360000002 HC RX W HCPCS: Performed by: STUDENT IN AN ORGANIZED HEALTH CARE EDUCATION/TRAINING PROGRAM

## 2024-05-17 PROCEDURE — 97162 PT EVAL MOD COMPLEX 30 MIN: CPT | Performed by: PHYSICAL THERAPIST

## 2024-05-17 PROCEDURE — 2580000003 HC RX 258: Performed by: HOSPITALIST

## 2024-05-17 PROCEDURE — 6370000000 HC RX 637 (ALT 250 FOR IP): Performed by: STUDENT IN AN ORGANIZED HEALTH CARE EDUCATION/TRAINING PROGRAM

## 2024-05-17 PROCEDURE — 97530 THERAPEUTIC ACTIVITIES: CPT | Performed by: PHYSICAL THERAPIST

## 2024-05-17 PROCEDURE — 97530 THERAPEUTIC ACTIVITIES: CPT

## 2024-05-17 RX ORDER — POTASSIUM CHLORIDE 750 MG/1
20 TABLET, FILM COATED, EXTENDED RELEASE ORAL ONCE
Status: COMPLETED | OUTPATIENT
Start: 2024-05-17 | End: 2024-05-17

## 2024-05-17 RX ORDER — MORPHINE SULFATE 2 MG/ML
2 INJECTION, SOLUTION INTRAMUSCULAR; INTRAVENOUS EVERY 4 HOURS PRN
Status: DISCONTINUED | OUTPATIENT
Start: 2024-05-17 | End: 2024-05-22 | Stop reason: HOSPADM

## 2024-05-17 RX ADMIN — HYDRALAZINE HYDROCHLORIDE 10 MG: 10 TABLET, FILM COATED ORAL at 11:35

## 2024-05-17 RX ADMIN — SENNOSIDES 8.6 MG: 8.6 TABLET, FILM COATED ORAL at 20:57

## 2024-05-17 RX ADMIN — MEROPENEM 500 MG: 500 INJECTION, POWDER, FOR SOLUTION INTRAVENOUS at 10:10

## 2024-05-17 RX ADMIN — MORPHINE SULFATE 2 MG: 2 INJECTION, SOLUTION INTRAMUSCULAR; INTRAVENOUS at 15:48

## 2024-05-17 RX ADMIN — NIFEDIPINE 30 MG: 30 TABLET, EXTENDED RELEASE ORAL at 11:35

## 2024-05-17 RX ADMIN — POTASSIUM CHLORIDE 20 MEQ: 750 TABLET, FILM COATED, EXTENDED RELEASE ORAL at 11:35

## 2024-05-17 RX ADMIN — ENOXAPARIN SODIUM 30 MG: 100 INJECTION SUBCUTANEOUS at 11:35

## 2024-05-17 RX ADMIN — MEROPENEM 500 MG: 500 INJECTION, POWDER, FOR SOLUTION INTRAVENOUS at 21:01

## 2024-05-17 RX ADMIN — TAMSULOSIN HYDROCHLORIDE 0.4 MG: 0.4 CAPSULE ORAL at 11:35

## 2024-05-17 RX ADMIN — POLYETHYLENE GLYCOL 3350 17 G: 17 POWDER, FOR SOLUTION ORAL at 15:43

## 2024-05-17 RX ADMIN — FINASTERIDE 5 MG: 5 TABLET, FILM COATED ORAL at 11:35

## 2024-05-17 RX ADMIN — HYDRALAZINE HYDROCHLORIDE 10 MG: 10 TABLET, FILM COATED ORAL at 20:57

## 2024-05-17 RX ADMIN — METOPROLOL TARTRATE 25 MG: 25 TABLET, FILM COATED ORAL at 11:35

## 2024-05-17 RX ADMIN — DEXTROSE MONOHYDRATE: 50 INJECTION, SOLUTION INTRAVENOUS at 08:58

## 2024-05-17 RX ADMIN — METOPROLOL TARTRATE 25 MG: 25 TABLET, FILM COATED ORAL at 20:57

## 2024-05-17 ASSESSMENT — PAIN SCALES - GENERAL: PAINLEVEL_OUTOF10: 7

## 2024-05-17 ASSESSMENT — PAIN DESCRIPTION - LOCATION: LOCATION: HIP

## 2024-05-17 ASSESSMENT — PAIN SCALES - PAIN ASSESSMENT IN ADVANCED DEMENTIA (PAINAD)
FACIALEXPRESSION: SMILING OR INEXPRESSIVE
CONSOLABILITY: NO NEED TO CONSOLE
BREATHING: NORMAL
BODYLANGUAGE: RELAXED
TOTALSCORE: 0

## 2024-05-17 ASSESSMENT — PAIN DESCRIPTION - ORIENTATION: ORIENTATION: RIGHT

## 2024-05-17 ASSESSMENT — PAIN DESCRIPTION - DESCRIPTORS: DESCRIPTORS: ACHING

## 2024-05-18 ENCOUNTER — APPOINTMENT (OUTPATIENT)
Dept: CT IMAGING | Age: 84
DRG: 698 | End: 2024-05-18
Payer: MEDICARE

## 2024-05-18 LAB
ALBUMIN SERPL-MCNC: 2.1 G/DL (ref 3.4–5)
ANION GAP SERPL CALCULATED.3IONS-SCNC: 13 MMOL/L (ref 3–16)
BACTERIA BLD CULT: ABNORMAL
BACTERIA UR CULT: ABNORMAL
BACTERIA UR CULT: ABNORMAL
BUN SERPL-MCNC: 52 MG/DL (ref 7–20)
CALCIUM SERPL-MCNC: 8.3 MG/DL (ref 8.3–10.6)
CHLORIDE SERPL-SCNC: 109 MMOL/L (ref 99–110)
CO2 SERPL-SCNC: 21 MMOL/L (ref 21–32)
CREAT SERPL-MCNC: 2.8 MG/DL (ref 0.8–1.3)
DEPRECATED RDW RBC AUTO: 16.8 % (ref 12.4–15.4)
GFR SERPLBLD CREATININE-BSD FMLA CKD-EPI: 22 ML/MIN/{1.73_M2}
GLUCOSE SERPL-MCNC: 117 MG/DL (ref 70–99)
HCT VFR BLD AUTO: 23.2 % (ref 40.5–52.5)
HGB BLD-MCNC: 7.3 G/DL (ref 13.5–17.5)
MAGNESIUM SERPL-MCNC: 1.8 MG/DL (ref 1.8–2.4)
MCH RBC QN AUTO: 24.9 PG (ref 26–34)
MCHC RBC AUTO-ENTMCNC: 31.3 G/DL (ref 31–36)
MCV RBC AUTO: 79.6 FL (ref 80–100)
ORGANISM: ABNORMAL
PHOSPHATE SERPL-MCNC: 3.2 MG/DL (ref 2.5–4.9)
PLATELET # BLD AUTO: 223 K/UL (ref 135–450)
PMV BLD AUTO: 10.8 FL (ref 5–10.5)
POTASSIUM SERPL-SCNC: 4.1 MMOL/L (ref 3.5–5.1)
RBC # BLD AUTO: 2.91 M/UL (ref 4.2–5.9)
SODIUM SERPL-SCNC: 141 MMOL/L (ref 136–145)
SODIUM SERPL-SCNC: 142 MMOL/L (ref 136–145)
SODIUM SERPL-SCNC: 142 MMOL/L (ref 136–145)
SODIUM SERPL-SCNC: 143 MMOL/L (ref 136–145)
WBC # BLD AUTO: 14 K/UL (ref 4–11)

## 2024-05-18 PROCEDURE — 85027 COMPLETE CBC AUTOMATED: CPT

## 2024-05-18 PROCEDURE — 2060000000 HC ICU INTERMEDIATE R&B

## 2024-05-18 PROCEDURE — 6370000000 HC RX 637 (ALT 250 FOR IP)

## 2024-05-18 PROCEDURE — 6360000002 HC RX W HCPCS: Performed by: HOSPITALIST

## 2024-05-18 PROCEDURE — 84295 ASSAY OF SERUM SODIUM: CPT

## 2024-05-18 PROCEDURE — 99222 1ST HOSP IP/OBS MODERATE 55: CPT | Performed by: ORTHOPAEDIC SURGERY

## 2024-05-18 PROCEDURE — 36415 COLL VENOUS BLD VENIPUNCTURE: CPT

## 2024-05-18 PROCEDURE — C1751 CATH, INF, PER/CENT/MIDLINE: HCPCS

## 2024-05-18 PROCEDURE — 83735 ASSAY OF MAGNESIUM: CPT

## 2024-05-18 PROCEDURE — 73700 CT LOWER EXTREMITY W/O DYE: CPT

## 2024-05-18 PROCEDURE — 6360000002 HC RX W HCPCS: Performed by: STUDENT IN AN ORGANIZED HEALTH CARE EDUCATION/TRAINING PROGRAM

## 2024-05-18 PROCEDURE — 6370000000 HC RX 637 (ALT 250 FOR IP): Performed by: STUDENT IN AN ORGANIZED HEALTH CARE EDUCATION/TRAINING PROGRAM

## 2024-05-18 PROCEDURE — 94760 N-INVAS EAR/PLS OXIMETRY 1: CPT

## 2024-05-18 PROCEDURE — 80069 RENAL FUNCTION PANEL: CPT

## 2024-05-18 PROCEDURE — 2580000003 HC RX 258: Performed by: HOSPITALIST

## 2024-05-18 PROCEDURE — 36569 INSJ PICC 5 YR+ W/O IMAGING: CPT

## 2024-05-18 PROCEDURE — 99233 SBSQ HOSP IP/OBS HIGH 50: CPT | Performed by: INTERNAL MEDICINE

## 2024-05-18 RX ORDER — SODIUM CHLORIDE 0.9 % (FLUSH) 0.9 %
5-40 SYRINGE (ML) INJECTION EVERY 12 HOURS SCHEDULED
Status: DISCONTINUED | OUTPATIENT
Start: 2024-05-18 | End: 2024-05-19 | Stop reason: SDUPTHER

## 2024-05-18 RX ORDER — SODIUM CHLORIDE 9 MG/ML
25 INJECTION, SOLUTION INTRAVENOUS PRN
Status: DISCONTINUED | OUTPATIENT
Start: 2024-05-18 | End: 2024-05-22 | Stop reason: HOSPADM

## 2024-05-18 RX ORDER — LIDOCAINE HYDROCHLORIDE 10 MG/ML
5 INJECTION, SOLUTION EPIDURAL; INFILTRATION; INTRACAUDAL; PERINEURAL ONCE
Status: DISCONTINUED | OUTPATIENT
Start: 2024-05-18 | End: 2024-05-22 | Stop reason: HOSPADM

## 2024-05-18 RX ORDER — SODIUM CHLORIDE 0.9 % (FLUSH) 0.9 %
5-40 SYRINGE (ML) INJECTION PRN
Status: DISCONTINUED | OUTPATIENT
Start: 2024-05-18 | End: 2024-05-19 | Stop reason: SDUPTHER

## 2024-05-18 RX ADMIN — HYDRALAZINE HYDROCHLORIDE 10 MG: 10 TABLET, FILM COATED ORAL at 11:37

## 2024-05-18 RX ADMIN — MORPHINE SULFATE 2 MG: 2 INJECTION, SOLUTION INTRAMUSCULAR; INTRAVENOUS at 11:11

## 2024-05-18 RX ADMIN — POLYETHYLENE GLYCOL 3350 17 G: 17 POWDER, FOR SOLUTION ORAL at 11:16

## 2024-05-18 RX ADMIN — FINASTERIDE 5 MG: 5 TABLET, FILM COATED ORAL at 11:37

## 2024-05-18 RX ADMIN — SENNOSIDES 8.6 MG: 8.6 TABLET, FILM COATED ORAL at 20:24

## 2024-05-18 RX ADMIN — HYDRALAZINE HYDROCHLORIDE 10 MG: 10 TABLET, FILM COATED ORAL at 20:24

## 2024-05-18 RX ADMIN — TAMSULOSIN HYDROCHLORIDE 0.4 MG: 0.4 CAPSULE ORAL at 11:37

## 2024-05-18 RX ADMIN — METOPROLOL TARTRATE 25 MG: 25 TABLET, FILM COATED ORAL at 11:36

## 2024-05-18 RX ADMIN — NIFEDIPINE 30 MG: 30 TABLET, EXTENDED RELEASE ORAL at 11:36

## 2024-05-18 RX ADMIN — MEROPENEM 500 MG: 500 INJECTION, POWDER, FOR SOLUTION INTRAVENOUS at 12:32

## 2024-05-18 RX ADMIN — METOPROLOL TARTRATE 25 MG: 25 TABLET, FILM COATED ORAL at 20:24

## 2024-05-18 RX ADMIN — MEROPENEM 500 MG: 500 INJECTION, POWDER, FOR SOLUTION INTRAVENOUS at 20:26

## 2024-05-18 RX ADMIN — ENOXAPARIN SODIUM 30 MG: 100 INJECTION SUBCUTANEOUS at 11:37

## 2024-05-18 ASSESSMENT — PAIN DESCRIPTION - DESCRIPTORS: DESCRIPTORS: ACHING;DISCOMFORT

## 2024-05-18 ASSESSMENT — PAIN DESCRIPTION - LOCATION: LOCATION: HIP

## 2024-05-18 ASSESSMENT — PAIN DESCRIPTION - ORIENTATION: ORIENTATION: RIGHT

## 2024-05-18 ASSESSMENT — PAIN SCALES - GENERAL: PAINLEVEL_OUTOF10: 10

## 2024-05-19 LAB
ALBUMIN SERPL-MCNC: 2.3 G/DL (ref 3.4–5)
ANION GAP SERPL CALCULATED.3IONS-SCNC: 11 MMOL/L (ref 3–16)
BUN SERPL-MCNC: 45 MG/DL (ref 7–20)
CALCIUM SERPL-MCNC: 8.4 MG/DL (ref 8.3–10.6)
CHLORIDE SERPL-SCNC: 113 MMOL/L (ref 99–110)
CO2 SERPL-SCNC: 21 MMOL/L (ref 21–32)
CREAT SERPL-MCNC: 2.6 MG/DL (ref 0.8–1.3)
DEPRECATED RDW RBC AUTO: 16.5 % (ref 12.4–15.4)
GFR SERPLBLD CREATININE-BSD FMLA CKD-EPI: 24 ML/MIN/{1.73_M2}
GLUCOSE SERPL-MCNC: 110 MG/DL (ref 70–99)
HCT VFR BLD AUTO: 23.1 % (ref 40.5–52.5)
HGB BLD-MCNC: 7.4 G/DL (ref 13.5–17.5)
MAGNESIUM SERPL-MCNC: 1.9 MG/DL (ref 1.8–2.4)
MCH RBC QN AUTO: 25.4 PG (ref 26–34)
MCHC RBC AUTO-ENTMCNC: 32.1 G/DL (ref 31–36)
MCV RBC AUTO: 79.1 FL (ref 80–100)
PHOSPHATE SERPL-MCNC: 3.8 MG/DL (ref 2.5–4.9)
PLATELET # BLD AUTO: 259 K/UL (ref 135–450)
PMV BLD AUTO: 10.7 FL (ref 5–10.5)
POTASSIUM SERPL-SCNC: 4 MMOL/L (ref 3.5–5.1)
RBC # BLD AUTO: 2.92 M/UL (ref 4.2–5.9)
SODIUM SERPL-SCNC: 145 MMOL/L (ref 136–145)
SODIUM SERPL-SCNC: 145 MMOL/L (ref 136–145)
SODIUM SERPL-SCNC: 146 MMOL/L (ref 136–145)
SODIUM SERPL-SCNC: 148 MMOL/L (ref 136–145)
WBC # BLD AUTO: 12.3 K/UL (ref 4–11)

## 2024-05-19 PROCEDURE — 85027 COMPLETE CBC AUTOMATED: CPT

## 2024-05-19 PROCEDURE — 6370000000 HC RX 637 (ALT 250 FOR IP)

## 2024-05-19 PROCEDURE — 84295 ASSAY OF SERUM SODIUM: CPT

## 2024-05-19 PROCEDURE — 6360000002 HC RX W HCPCS: Performed by: HOSPITALIST

## 2024-05-19 PROCEDURE — 80069 RENAL FUNCTION PANEL: CPT

## 2024-05-19 PROCEDURE — 2580000003 HC RX 258: Performed by: INTERNAL MEDICINE

## 2024-05-19 PROCEDURE — 2580000003 HC RX 258: Performed by: HOSPITALIST

## 2024-05-19 PROCEDURE — 83735 ASSAY OF MAGNESIUM: CPT

## 2024-05-19 PROCEDURE — 6370000000 HC RX 637 (ALT 250 FOR IP): Performed by: STUDENT IN AN ORGANIZED HEALTH CARE EDUCATION/TRAINING PROGRAM

## 2024-05-19 PROCEDURE — 6370000000 HC RX 637 (ALT 250 FOR IP): Performed by: HOSPITALIST

## 2024-05-19 PROCEDURE — 6360000002 HC RX W HCPCS: Performed by: INTERNAL MEDICINE

## 2024-05-19 PROCEDURE — 36415 COLL VENOUS BLD VENIPUNCTURE: CPT

## 2024-05-19 PROCEDURE — 2580000003 HC RX 258: Performed by: STUDENT IN AN ORGANIZED HEALTH CARE EDUCATION/TRAINING PROGRAM

## 2024-05-19 PROCEDURE — 2060000000 HC ICU INTERMEDIATE R&B

## 2024-05-19 PROCEDURE — 6360000002 HC RX W HCPCS: Performed by: STUDENT IN AN ORGANIZED HEALTH CARE EDUCATION/TRAINING PROGRAM

## 2024-05-19 RX ORDER — SODIUM CHLORIDE 9 MG/ML
25 INJECTION, SOLUTION INTRAVENOUS PRN
Status: DISCONTINUED | OUTPATIENT
Start: 2024-05-19 | End: 2024-05-22 | Stop reason: HOSPADM

## 2024-05-19 RX ORDER — BISACODYL 5 MG/1
10 TABLET, DELAYED RELEASE ORAL ONCE
Status: COMPLETED | OUTPATIENT
Start: 2024-05-19 | End: 2024-05-19

## 2024-05-19 RX ORDER — SODIUM CHLORIDE 0.9 % (FLUSH) 0.9 %
5-40 SYRINGE (ML) INJECTION EVERY 12 HOURS SCHEDULED
Status: DISCONTINUED | OUTPATIENT
Start: 2024-05-19 | End: 2024-05-22 | Stop reason: HOSPADM

## 2024-05-19 RX ORDER — BISACODYL 10 MG
10 SUPPOSITORY, RECTAL RECTAL ONCE
Status: DISCONTINUED | OUTPATIENT
Start: 2024-05-19 | End: 2024-05-19

## 2024-05-19 RX ORDER — SODIUM CHLORIDE 0.9 % (FLUSH) 0.9 %
5-40 SYRINGE (ML) INJECTION PRN
Status: DISCONTINUED | OUTPATIENT
Start: 2024-05-19 | End: 2024-05-22 | Stop reason: HOSPADM

## 2024-05-19 RX ORDER — LIDOCAINE HYDROCHLORIDE 10 MG/ML
5 INJECTION, SOLUTION EPIDURAL; INFILTRATION; INTRACAUDAL; PERINEURAL ONCE
Status: DISCONTINUED | OUTPATIENT
Start: 2024-05-19 | End: 2024-05-22 | Stop reason: HOSPADM

## 2024-05-19 RX ADMIN — TAMSULOSIN HYDROCHLORIDE 0.4 MG: 0.4 CAPSULE ORAL at 09:24

## 2024-05-19 RX ADMIN — HYDRALAZINE HYDROCHLORIDE 10 MG: 10 TABLET, FILM COATED ORAL at 22:05

## 2024-05-19 RX ADMIN — HYDRALAZINE HYDROCHLORIDE 10 MG: 10 TABLET, FILM COATED ORAL at 09:24

## 2024-05-19 RX ADMIN — SODIUM CHLORIDE: 9 INJECTION, SOLUTION INTRAVENOUS at 09:17

## 2024-05-19 RX ADMIN — ERTAPENEM SODIUM 500 MG: 1 INJECTION INTRAMUSCULAR; INTRAVENOUS at 12:30

## 2024-05-19 RX ADMIN — POLYETHYLENE GLYCOL 3350 17 G: 17 POWDER, FOR SOLUTION ORAL at 10:08

## 2024-05-19 RX ADMIN — ENOXAPARIN SODIUM 30 MG: 100 INJECTION SUBCUTANEOUS at 09:24

## 2024-05-19 RX ADMIN — METOPROLOL TARTRATE 25 MG: 25 TABLET, FILM COATED ORAL at 09:24

## 2024-05-19 RX ADMIN — MEROPENEM 500 MG: 500 INJECTION, POWDER, FOR SOLUTION INTRAVENOUS at 09:18

## 2024-05-19 RX ADMIN — SODIUM CHLORIDE, PRESERVATIVE FREE 10 ML: 5 INJECTION INTRAVENOUS at 09:25

## 2024-05-19 RX ADMIN — METOPROLOL TARTRATE 25 MG: 25 TABLET, FILM COATED ORAL at 22:05

## 2024-05-19 RX ADMIN — BISACODYL 10 MG: 5 TABLET, COATED ORAL at 12:30

## 2024-05-19 RX ADMIN — SENNOSIDES 8.6 MG: 8.6 TABLET, FILM COATED ORAL at 22:05

## 2024-05-19 RX ADMIN — NIFEDIPINE 30 MG: 30 TABLET, EXTENDED RELEASE ORAL at 09:24

## 2024-05-19 RX ADMIN — FINASTERIDE 5 MG: 5 TABLET, FILM COATED ORAL at 09:25

## 2024-05-19 ASSESSMENT — PAIN SCALES - GENERAL: PAINLEVEL_OUTOF10: 0

## 2024-05-19 NOTE — DISCHARGE INSTR - COC
Status/Restrictions: No weight bearing restrictions  Other Medical Equipment (for information only, NOT a DME order):  wheelchair, walker, and hospital bed  Other Treatments: none    Patient's personal belongings (please select all that are sent with patient):  Dentures upper and lower    RN SIGNATURE:  Electronically signed by Linda Adame RN on 5/22/24 at 3:39 PM EDT    CASE MANAGEMENT/SOCIAL WORK SECTION    Inpatient Status Date: 5/15/24    Readmission Risk Assessment Score:  Readmission Risk              Risk of Unplanned Readmission:  25         Discharging to Facility/ Agency   Name: [facility]  Address:  [address] [place] [address]   Phone:  [phone]    / signature: Electronically signed by Karina Purvis RN on 5/22/24 at 2:58 PM EDT    PHYSICIAN SECTION    Prognosis: Good    Condition at Discharge: Stable    Rehab Potential (if transferring to Rehab): Good    Recommended Labs or Other Treatments After Discharge:   IV Ertapenem x 500 mg  q 24 hr x stop date x 5/30  CBC with diff, CMP weekly  Fax results to   MID line in place  No ID follow up  First dose given in Hospital      .rv    Physician Certification: I certify the above information and transfer of Reji Kaufman  is necessary for the continuing treatment of the diagnosis listed and that he requires Skilled Nursing Facility for greater 30 days.     Update Admission H&P: No change in H&P    PHYSICIAN SIGNATURE:  Electronically signed by Martha Chu MD on 5/22/24 at 12:50 PM EDT      Followup Dr Fournier in office in 2 weeks  [facility], [address] [place] [address], [address]   [id number],   [phone]

## 2024-05-20 LAB
ABO + RH BLD: NORMAL
ALBUMIN SERPL-MCNC: 2.1 G/DL (ref 3.4–5)
ANION GAP SERPL CALCULATED.3IONS-SCNC: 9 MMOL/L (ref 3–16)
BACTERIA BLD CULT ORG #2: NORMAL
BACTERIA BLD CULT: NORMAL
BLD GP AB SCN SERPL QL: NORMAL
BLOOD BANK DISPENSE STATUS: NORMAL
BLOOD BANK PRODUCT CODE: NORMAL
BPU ID: NORMAL
BUN SERPL-MCNC: 44 MG/DL (ref 7–20)
CALCIUM SERPL-MCNC: 8.7 MG/DL (ref 8.3–10.6)
CHLORIDE SERPL-SCNC: 110 MMOL/L (ref 99–110)
CO2 SERPL-SCNC: 24 MMOL/L (ref 21–32)
CREAT SERPL-MCNC: 2.3 MG/DL (ref 0.8–1.3)
DEPRECATED RDW RBC AUTO: 16.9 % (ref 12.4–15.4)
DESCRIPTION BLOOD BANK: NORMAL
GFR SERPLBLD CREATININE-BSD FMLA CKD-EPI: 27 ML/MIN/{1.73_M2}
GLUCOSE SERPL-MCNC: 106 MG/DL (ref 70–99)
HCT VFR BLD AUTO: 20.7 % (ref 40.5–52.5)
HCT VFR BLD AUTO: 23.7 % (ref 40.5–52.5)
HGB BLD-MCNC: 6.5 G/DL (ref 13.5–17.5)
HGB BLD-MCNC: 7.7 G/DL (ref 13.5–17.5)
MAGNESIUM SERPL-MCNC: 1.9 MG/DL (ref 1.8–2.4)
MCH RBC QN AUTO: 24.9 PG (ref 26–34)
MCHC RBC AUTO-ENTMCNC: 31.3 G/DL (ref 31–36)
MCV RBC AUTO: 79.3 FL (ref 80–100)
PHOSPHATE SERPL-MCNC: 3.6 MG/DL (ref 2.5–4.9)
PLATELET # BLD AUTO: 240 K/UL (ref 135–450)
PMV BLD AUTO: 10.9 FL (ref 5–10.5)
POTASSIUM SERPL-SCNC: 4.1 MMOL/L (ref 3.5–5.1)
RBC # BLD AUTO: 2.61 M/UL (ref 4.2–5.9)
SODIUM SERPL-SCNC: 143 MMOL/L (ref 136–145)
WBC # BLD AUTO: 10.1 K/UL (ref 4–11)

## 2024-05-20 PROCEDURE — 84295 ASSAY OF SERUM SODIUM: CPT

## 2024-05-20 PROCEDURE — 36415 COLL VENOUS BLD VENIPUNCTURE: CPT

## 2024-05-20 PROCEDURE — 6370000000 HC RX 637 (ALT 250 FOR IP): Performed by: STUDENT IN AN ORGANIZED HEALTH CARE EDUCATION/TRAINING PROGRAM

## 2024-05-20 PROCEDURE — 80069 RENAL FUNCTION PANEL: CPT

## 2024-05-20 PROCEDURE — 86850 RBC ANTIBODY SCREEN: CPT

## 2024-05-20 PROCEDURE — 6370000000 HC RX 637 (ALT 250 FOR IP): Performed by: PHYSICIAN ASSISTANT

## 2024-05-20 PROCEDURE — 2060000000 HC ICU INTERMEDIATE R&B

## 2024-05-20 PROCEDURE — 85018 HEMOGLOBIN: CPT

## 2024-05-20 PROCEDURE — 6370000000 HC RX 637 (ALT 250 FOR IP)

## 2024-05-20 PROCEDURE — 85027 COMPLETE CBC AUTOMATED: CPT

## 2024-05-20 PROCEDURE — 85014 HEMATOCRIT: CPT

## 2024-05-20 PROCEDURE — 86900 BLOOD TYPING SEROLOGIC ABO: CPT

## 2024-05-20 PROCEDURE — 94760 N-INVAS EAR/PLS OXIMETRY 1: CPT

## 2024-05-20 PROCEDURE — 2580000003 HC RX 258: Performed by: INTERNAL MEDICINE

## 2024-05-20 PROCEDURE — 83735 ASSAY OF MAGNESIUM: CPT

## 2024-05-20 PROCEDURE — 99232 SBSQ HOSP IP/OBS MODERATE 35: CPT | Performed by: INTERNAL MEDICINE

## 2024-05-20 PROCEDURE — P9016 RBC LEUKOCYTES REDUCED: HCPCS

## 2024-05-20 PROCEDURE — 6370000000 HC RX 637 (ALT 250 FOR IP): Performed by: HOSPITALIST

## 2024-05-20 PROCEDURE — 86923 COMPATIBILITY TEST ELECTRIC: CPT

## 2024-05-20 PROCEDURE — 36430 TRANSFUSION BLD/BLD COMPNT: CPT

## 2024-05-20 PROCEDURE — 93005 ELECTROCARDIOGRAM TRACING: CPT | Performed by: HOSPITALIST

## 2024-05-20 PROCEDURE — 6360000002 HC RX W HCPCS: Performed by: INTERNAL MEDICINE

## 2024-05-20 PROCEDURE — 86901 BLOOD TYPING SEROLOGIC RH(D): CPT

## 2024-05-20 RX ORDER — BISACODYL 10 MG
10 SUPPOSITORY, RECTAL RECTAL ONCE
Status: COMPLETED | OUTPATIENT
Start: 2024-05-20 | End: 2024-05-20

## 2024-05-20 RX ORDER — SODIUM CHLORIDE 9 MG/ML
INJECTION, SOLUTION INTRAVENOUS PRN
Status: DISCONTINUED | OUTPATIENT
Start: 2024-05-20 | End: 2024-05-22 | Stop reason: HOSPADM

## 2024-05-20 RX ADMIN — TAMSULOSIN HYDROCHLORIDE 0.4 MG: 0.4 CAPSULE ORAL at 08:00

## 2024-05-20 RX ADMIN — HYDRALAZINE HYDROCHLORIDE 10 MG: 10 TABLET, FILM COATED ORAL at 22:42

## 2024-05-20 RX ADMIN — ERTAPENEM SODIUM 500 MG: 1 INJECTION INTRAMUSCULAR; INTRAVENOUS at 12:51

## 2024-05-20 RX ADMIN — SODIUM CHLORIDE, PRESERVATIVE FREE 10 ML: 5 INJECTION INTRAVENOUS at 08:01

## 2024-05-20 RX ADMIN — NIFEDIPINE 30 MG: 30 TABLET, EXTENDED RELEASE ORAL at 08:00

## 2024-05-20 RX ADMIN — BISACODYL 10 MG: 10 SUPPOSITORY RECTAL at 09:42

## 2024-05-20 RX ADMIN — METOPROLOL TARTRATE 25 MG: 25 TABLET, FILM COATED ORAL at 22:43

## 2024-05-20 RX ADMIN — SENNOSIDES 8.6 MG: 8.6 TABLET, FILM COATED ORAL at 22:42

## 2024-05-20 RX ADMIN — DEXTROSE MONOHYDRATE: 50 INJECTION, SOLUTION INTRAVENOUS at 12:38

## 2024-05-20 RX ADMIN — POLYETHYLENE GLYCOL 3350, SODIUM SULFATE ANHYDROUS, SODIUM BICARBONATE, SODIUM CHLORIDE, POTASSIUM CHLORIDE 4000 ML: 236; 22.74; 6.74; 5.86; 2.97 POWDER, FOR SOLUTION ORAL at 17:16

## 2024-05-20 RX ADMIN — SODIUM CHLORIDE, PRESERVATIVE FREE 10 ML: 5 INJECTION INTRAVENOUS at 22:42

## 2024-05-20 RX ADMIN — HYDRALAZINE HYDROCHLORIDE 10 MG: 10 TABLET, FILM COATED ORAL at 08:00

## 2024-05-20 RX ADMIN — METOPROLOL TARTRATE 25 MG: 25 TABLET, FILM COATED ORAL at 08:01

## 2024-05-20 RX ADMIN — FINASTERIDE 5 MG: 5 TABLET, FILM COATED ORAL at 08:01

## 2024-05-20 ASSESSMENT — PAIN SCALES - GENERAL
PAINLEVEL_OUTOF10: 0

## 2024-05-20 ASSESSMENT — PAIN SCALES - WONG BAKER
WONGBAKER_NUMERICALRESPONSE: NO HURT
WONGBAKER_NUMERICALRESPONSE: NO HURT

## 2024-05-20 NOTE — CONSULTS
[facility]          [address] [place] [address]                              CONSULTATION      PATIENT NAME: REINA COOK                 : 1940  MED REC NO: [id number]                      ROOM: [place] [address]  ACCOUNT NO: [id number]                       ADMIT DATE: 2024  PROVIDER: Angie Rodriguez MD      REASON FOR CONSULTATION:  Acute kidney injury with hyponatremia.    HISTORY OF PRESENTING ILLNESS:  83-year-old  male with past medical history significant for hypertension, vascular dementia, chronic alcohol abuse, coronary artery disease, congestive heart failure, metastatic prostate cancer, on Lupron shots, follows up with  Urology, recently treated for acute kidney injury secondary to urinary retention and urinary tract infection, sent back to the [facility] ER because of worsening mentation.  Routine labs showed acute kidney injury with hypernatremia.  The patient was also found to have possible urinary tract infection and pneumonia as a possible source of his fever.  Urology has been consulted for concerning obstructive uropathy and metastatic prostate cancer.  Overnight, the patient being started on broad-spectrum antibiotic and hypotonic IV fluids.  Urology exchanged the Rose catheter that seems to be draining well.  At the time of consultation, the patient is confused, disoriented.    PAST MEDICAL HISTORY:    1. Chronic alcohol abuse.  2. Chronic kidney disease stage 3.  3. Hypertension.  4. Vascular dementia.  5. Metastatic prostatic cancer.  6. History of urinary tract infection.    PAST SURGICAL HISTORY:  None.    SOCIAL HISTORY:  Lives in a nursing home, apparently does not smoke or drink.    FAMILY HISTORY:  Unobtainable.    REVIEW OF SYSTEMS:  Limited due to the patient factor.    MEDICATIONS:  Include Proscar, hydralazine, Lopressor, Procardia, Flomax, Zofran, Tylenol.    PHYSICAL EXAMINATION:  GENERAL:  Lying in bed, looks 
      Infectious Diseases Inpatient Consult Note      Reason for Consult:   Sepsis, fevers Klebsiella ESBL UTI and Bacteremia     Requesting Physician:        Primary Care Physician:  Luis Felipe Jackson MD    History Obtained From:  Epic     CHIEF COMPLAINT:     Chief Complaint   Patient presents with    Altered Mental Status     Patient presents from [facility] for altered mental status. Patient has a history of vascular dementia, but nursing home states he has been acting \"not like himself since lunch time.\" Patent oriented x2 in triage. Disoriented to place and time. Patient denies any pain. Patient arrives with catheter in place. Fever and tachycardia present in triage.          HISTORY OF PRESENT ILLNESS:  83 y.o. old man with history of dementia admitted from nursing facility secondary to not feeling well increasing confusion.  Patient has a chronic Rose catheter in place for obstructive symptoms secondary to prostate issues.  He was diagnosed to have urinary retention when he was admitted recently to [facility] requiring  Rose  catheter placement as well as a diagnosed metastatic prostate cancer.  He is following with oncology [facility] was recently started on Lupron injection.  Due to recurrent bladder outlet obstruction he had a chronic Rose catheter placed.  He is now admitted to the hospital with change in mental status fever tachycardia found to be in sepsis.  Labs indicated sodium 167 creatinine 4.1 lactic acid 2.1 procalcitonin 9.10 WBC 19.6 hemoglobin 8.  Blood culture now positive for Klebsiella pneumonia ESBL urine culture positive for Klebsiella pneumonia, Tmax 100.9.  We are consulted for antibiotic recommendations        Past Medical History:    Past Medical History:   Diagnosis Date    Alcohol abuse     AV block, 1st degree     Hypertension        Past Surgical History:    No past surgical history on file.    Current Medications:    No outpatient medications have 
     Reason for Consult: Kidney stone concerns?  UA positive for oxalate WBCs and RBCs.  History of prostate cancer metastatic     History of Present Illness: Reji Kaufman is a 83 y.o. male PMH ETOH, HTN, dementia, CHF. Recently saw by Dr. Lynne at [facility] in 3/2024 for urinary retention w/ EVELINA and newly diagnosed metastatic prostate cancer. Patient Cr was as high as 18, resolved with rose catheter placement and Cr down to 1.28 prior to DC. He was advised to keep rose catheter at discharge. He follows / [facility] who started him on s0asuza Lupron and follows /  urology. Failed voiding trial per  urology notes and had recent admission last month for urosepsis and retention.    Patient presented to ER yesterday from nursing home for AMS, fever, tachycardia. Patient admitted for management of pneumonia, possible UTI, hypernatremia. CTAP reveals mild right pelvicaliectasis, fat stranding around urinary bladder, increased metastatic adenopathy in abd/pelvis, no evidence of hydro or stones. UA reveals possible infection vs contamination from chronic rose, turbid, +pyuria >3000 WBC/hpf, 4+ bacteria, 1+ CaOx. Urine and blood cultures pending. Patient w/ EVELINA Cr 4.0-->4.1 today, baseline ~1. WBC up to 19.6 today. Tachycardic on admission, however improved today. Rose catheter in place.     Patient resting in bed today, he denies pain. Rose catheter draining yellow clear UOP.     Past Medical History:   Past Medical History:   Diagnosis Date    Alcohol abuse     AV block, 1st degree     Hypertension        Past Surgical History:  No past surgical history on file.    Social History:  Social History     Socioeconomic History    Marital status:      Spouse name: Not on file    Number of children: Not on file    Years of education: Not on file    Highest education level: Not on file   Occupational History    Not on file   Tobacco Use    Smoking status: Some Days     Types: Cigarettes    Smokeless tobacco: 
  Nephrology Consult Note   [email or web address].Seen      Reason for consultation: Hypernatremia / EVELINA -- last known Cr 1.0 mg/dL on 4/18/2024. Plans for follow with Dr. Thomas outpatient    History of Present Illness: Reji Kaufman is an 84 yo male with a PMHx of recurrent EVELINA (most severe being 2/2 urinary retention with pk Cr 18.5 mg/dL at [facility] in 2/2024 -- did not require RRT), dementia, HTN, metastatic prostate cancer. Patient presents to  ED on 5/14/2024 from [facility] for AMS. Patient is currently Ox1 and somewhat confused, but is able to provide some history. CT head is negative for acute findings. CXR suggestive of PNA. CTAP reveals trace pelvicaliectasis on the right with urothelial thickening, and fat stranding surrounding the bladder. Chronic corado cathter is in place. UA is suggestive of UTI. CTAP also reveals an increase in metastatic adenopathy. No IV contrast was used. WBC 18.5>19.6. Lactic acid 2.1. Procal 9.1. Started on cefepime and vancomycin.     We have been consulted for hypernatremia and EVELINA management. Upon admission, Na+ 169 mmol/L, Cl 131, BUN 77, Cr 4.0 mg/dL. Patient endorses drinking little throughout the day. Unclear whether or not he is eating. Endorses some loose stools. Denies emesis. BP is stable. No ACE/ARBs or diuretics in PTA med list. UA reveals 11-20 coarse casts, 300 protein, and large blood. Received a 500 mL NS bolus, started on 0.45% NS @ 75 mL/hr, and then transitioned to D5W @ 125 mL/hr. Repeat labs reveal Na+ 167 mmol/L, Cl 130, BUN 74, Cr 4.1 mg/dL.     Subjective:      Patient seen and examined. Labs and chart reviewed. Resting in bed on RA.     There were not complications last night.    Patient review of systems: Denies SOB. See HPI for other pertinent ROS.     Past Medical History:   Diagnosis Date    Alcohol abuse     AV block, 1st degree     Hypertension        No past surgical history on file.    Allergies:  No Known Allergies     Social Determinants of Health with 
Clinical Pharmacy Note  Vancomycin Consult    Pharmacy consult received for one-time dose of vancomycin in the Emergency Department per KAREN Harrison.    Ht Readings from Last 1 Encounters:   05/14/24 1.829 m (6')        Wt Readings from Last 1 Encounters:   05/14/24 76.8 kg (169 lb 5 oz)         Assessment/Plan:  Vancomycin 1500mg x 1 in ED.  If vancomycin is to continue on admission and pharmacy is to manage dosing, please re-consult with admission orders.    Katie Fonseca, ZairaD        
GASTROENTEROLOGY INPATIENT CONSULTATION        IDENTIFYING DATA/REASON FOR CONSULTATION   PATIENT:  Reji Kaufman  MRN:  [id number]  ADMIT DATE: 5/14/2024  TIME OF EVALUATION: 5/20/2024 10:02 AM  HOSPITAL STAY:   LOS: 5 days     REASON FOR CONSULTATION:  Anemia    HISTORY OF PRESENT ILLNESS   Reji Kaufman is a 83 y.o. male with a PMH of hypertension, vascular dementia, chronic alcohol abuse, coronary artery disease, congestive heart failure, metastatic prostate cancer, on Lupron shots  who presented on 5/14/2024 with increasing confusion. We have been consulted regarding anemia. He has received 2U PRBCs thus far. He is not a great historian. I am unable to find prior endoscopies for the patient. He denies any overt signs of bleeding. RN reports the patient has not had a BM since admission. He denies abdominal pain, nausea, vomiting. Not on anticoagulation.      PAST MEDICAL, SURGICAL, FAMILY, and SOCIAL HISTORY     Past Medical History:   Diagnosis Date    Alcohol abuse     AV block, 1st degree     Hypertension      No past surgical history on file.  No family history on file.  Social History     Socioeconomic History    Marital status:    Tobacco Use    Smoking status: Some Days     Types: Cigarettes    Smokeless tobacco: Former   Substance and Sexual Activity    Alcohol use: Not Currently    Drug use: Never     Social Determinants of Health     Food Insecurity: Patient Unable To Answer (5/15/2024)    Hunger Vital Sign     Worried About Running Out of Food in the Last Year: Patient unable to answer     Ran Out of Food in the Last Year: Patient unable to answer   Transportation Needs: Patient Unable To Answer (5/15/2024)    PRAPARE - Transportation     Lack of Transportation (Medical): Patient unable to answer     Lack of Transportation (Non-Medical): Patient unable to answer   Housing Stability: Patient Unable To Answer (5/15/2024)    Housing Stability Vital Sign     Unable to Pay for Housing in the Last 
Examination:  GENERAL: No apparent distress, lying in hospital bed  SKIN:  Warm and dry  HEAD: Normocephalic, atraumatic  RESPIRATORY: Resp easy and unlabored  PSYCHIATRIC: Appropriate affect; not agitated  MUSCULOSKELETAL:    RLE: Minimal pain with active and passive right hip range of motion.  motor function and sensation intact distally.    Labs reviewed:  Recent Labs     05/16/24  0942 05/16/24  1624 05/17/24  0527 05/18/24  0521   WBC 19.5*  --  13.0* 14.0*   HGB 6.7* 8.2* 7.4* 7.3*   HCT 21.6* 27.2* 23.1* 23.2*     --  190 223     Recent Labs     05/16/24  0658 05/16/24  0932 05/17/24  0527 05/17/24  0933 05/18/24  0521 05/18/24  0958 05/18/24  1552   *  156*   < > 147*   < > 143 141 142   K 3.3*  --  3.4*  --  4.1  --   --    *  --  114*  --  109  --   --    CO2 24  --  21  --  21  --   --    BUN 59*  --  57*  --  52*  --   --    CREATININE 3.4*  --  2.9*  --  2.8*  --   --    GLUCOSE 123*  --  118*  --  117*  --   --    CALCIUM 8.1*  --  7.9*  --  8.3  --   --    MG 2.00  --  1.80  --  1.80  --   --    PHOS 2.5  --  2.6  --  3.2  --   --     < > = values in this interval not displayed.     Imaging:  Right hip radiographs performed yesterday were interpreted by myself.  He has osteoarthritis of both hip joints associate with joint space narrowing and cam deformity of the femoral head bilaterally.  There are no signs of fracture.  However, the radiologist report does note a fracture.  I do not see any signs of fracture on the x-ray.  I therefore ordered a CT of the right hip which was performed earlier today.  There is no fracture of the hip joint.  He does have osteoarthritis of the right hip.  There is a bone island versus metastatic lesion in the left femoral neck.    IMPRESSION:  Right hip osteoarthritis    RECOMMENDATIONS:  We discussed the diagnosis and treatment options for his right hip arthritis.  He may take NSAIDs as needed for pain control if able.  We may arrange an

## 2024-05-20 NOTE — CARE COORDINATION
DISCHARGE PLANNING:    DC plan to return to [facility].  No precert needed to return.  Will need transport.  Patient currently getting blood d/t hemoglobin of 6.5.      #[phone]  Electronically signed by Karina Purvis RN on 5/20/2024 at 12:15 PM

## 2024-05-21 ENCOUNTER — ANESTHESIA EVENT (OUTPATIENT)
Dept: ENDOSCOPY | Age: 84
DRG: 698 | End: 2024-05-21
Payer: MEDICARE

## 2024-05-21 ENCOUNTER — ANESTHESIA (OUTPATIENT)
Dept: ENDOSCOPY | Age: 84
DRG: 698 | End: 2024-05-21
Payer: MEDICARE

## 2024-05-21 LAB
ALBUMIN SERPL-MCNC: 2.5 G/DL (ref 3.4–5)
ANION GAP SERPL CALCULATED.3IONS-SCNC: 10 MMOL/L (ref 3–16)
BUN SERPL-MCNC: 40 MG/DL (ref 7–20)
CALCIUM SERPL-MCNC: 8.4 MG/DL (ref 8.3–10.6)
CHLORIDE SERPL-SCNC: 106 MMOL/L (ref 99–110)
CO2 SERPL-SCNC: 25 MMOL/L (ref 21–32)
CREAT SERPL-MCNC: 2 MG/DL (ref 0.8–1.3)
DEPRECATED RDW RBC AUTO: 16.6 % (ref 12.4–15.4)
EKG ATRIAL RATE: 91 BPM
EKG DIAGNOSIS: NORMAL
EKG P AXIS: 70 DEGREES
EKG P-R INTERVAL: 304 MS
EKG Q-T INTERVAL: 350 MS
EKG QRS DURATION: 78 MS
EKG QTC CALCULATION (BAZETT): 430 MS
EKG R AXIS: 15 DEGREES
EKG T AXIS: 31 DEGREES
EKG VENTRICULAR RATE: 91 BPM
GFR SERPLBLD CREATININE-BSD FMLA CKD-EPI: 32 ML/MIN/{1.73_M2}
GLUCOSE SERPL-MCNC: 96 MG/DL (ref 70–99)
HCT VFR BLD AUTO: 24.9 % (ref 40.5–52.5)
HEMOCCULT SP1 STL QL: NORMAL
HGB BLD-MCNC: 8.1 G/DL (ref 13.5–17.5)
MCH RBC QN AUTO: 25.8 PG (ref 26–34)
MCHC RBC AUTO-ENTMCNC: 32.7 G/DL (ref 31–36)
MCV RBC AUTO: 78.7 FL (ref 80–100)
PHOSPHATE SERPL-MCNC: 3.6 MG/DL (ref 2.5–4.9)
PLATELET # BLD AUTO: 185 K/UL (ref 135–450)
PMV BLD AUTO: 10.3 FL (ref 5–10.5)
POTASSIUM SERPL-SCNC: 4.5 MMOL/L (ref 3.5–5.1)
RBC # BLD AUTO: 3.16 M/UL (ref 4.2–5.9)
SODIUM SERPL-SCNC: 141 MMOL/L (ref 136–145)
WBC # BLD AUTO: 13.2 K/UL (ref 4–11)

## 2024-05-21 PROCEDURE — 85027 COMPLETE CBC AUTOMATED: CPT

## 2024-05-21 PROCEDURE — 6360000002 HC RX W HCPCS: Performed by: NURSE ANESTHETIST, CERTIFIED REGISTERED

## 2024-05-21 PROCEDURE — 7100000000 HC PACU RECOVERY - FIRST 15 MIN: Performed by: INTERNAL MEDICINE

## 2024-05-21 PROCEDURE — 3700000001 HC ADD 15 MINUTES (ANESTHESIA): Performed by: INTERNAL MEDICINE

## 2024-05-21 PROCEDURE — 0DJ08ZZ INSPECTION OF UPPER INTESTINAL TRACT, VIA NATURAL OR ARTIFICIAL OPENING ENDOSCOPIC: ICD-10-PCS | Performed by: INTERNAL MEDICINE

## 2024-05-21 PROCEDURE — 6370000000 HC RX 637 (ALT 250 FOR IP): Performed by: STUDENT IN AN ORGANIZED HEALTH CARE EDUCATION/TRAINING PROGRAM

## 2024-05-21 PROCEDURE — 0DJD8ZZ INSPECTION OF LOWER INTESTINAL TRACT, VIA NATURAL OR ARTIFICIAL OPENING ENDOSCOPIC: ICD-10-PCS | Performed by: INTERNAL MEDICINE

## 2024-05-21 PROCEDURE — 2709999900 HC NON-CHARGEABLE SUPPLY: Performed by: INTERNAL MEDICINE

## 2024-05-21 PROCEDURE — 05HY33Z INSERTION OF INFUSION DEVICE INTO UPPER VEIN, PERCUTANEOUS APPROACH: ICD-10-PCS | Performed by: STUDENT IN AN ORGANIZED HEALTH CARE EDUCATION/TRAINING PROGRAM

## 2024-05-21 PROCEDURE — 93010 ELECTROCARDIOGRAM REPORT: CPT | Performed by: INTERNAL MEDICINE

## 2024-05-21 PROCEDURE — 3609017100 HC EGD: Performed by: INTERNAL MEDICINE

## 2024-05-21 PROCEDURE — 3700000000 HC ANESTHESIA ATTENDED CARE: Performed by: INTERNAL MEDICINE

## 2024-05-21 PROCEDURE — 99232 SBSQ HOSP IP/OBS MODERATE 35: CPT | Performed by: INTERNAL MEDICINE

## 2024-05-21 PROCEDURE — 80069 RENAL FUNCTION PANEL: CPT

## 2024-05-21 PROCEDURE — 6370000000 HC RX 637 (ALT 250 FOR IP): Performed by: INTERNAL MEDICINE

## 2024-05-21 PROCEDURE — 3609027000 HC COLONOSCOPY: Performed by: INTERNAL MEDICINE

## 2024-05-21 PROCEDURE — 2500000003 HC RX 250 WO HCPCS: Performed by: NURSE ANESTHETIST, CERTIFIED REGISTERED

## 2024-05-21 PROCEDURE — 7100000001 HC PACU RECOVERY - ADDTL 15 MIN: Performed by: INTERNAL MEDICINE

## 2024-05-21 PROCEDURE — 97530 THERAPEUTIC ACTIVITIES: CPT

## 2024-05-21 PROCEDURE — 6370000000 HC RX 637 (ALT 250 FOR IP)

## 2024-05-21 PROCEDURE — 6360000002 HC RX W HCPCS: Performed by: STUDENT IN AN ORGANIZED HEALTH CARE EDUCATION/TRAINING PROGRAM

## 2024-05-21 PROCEDURE — 2580000003 HC RX 258: Performed by: INTERNAL MEDICINE

## 2024-05-21 PROCEDURE — 82270 OCCULT BLOOD FECES: CPT

## 2024-05-21 PROCEDURE — 6360000002 HC RX W HCPCS: Performed by: INTERNAL MEDICINE

## 2024-05-21 PROCEDURE — 2060000000 HC ICU INTERMEDIATE R&B

## 2024-05-21 RX ORDER — PROPOFOL 10 MG/ML
INJECTION, EMULSION INTRAVENOUS PRN
Status: DISCONTINUED | OUTPATIENT
Start: 2024-05-21 | End: 2024-05-21 | Stop reason: SDUPTHER

## 2024-05-21 RX ORDER — NALOXONE HYDROCHLORIDE 0.4 MG/ML
INJECTION, SOLUTION INTRAMUSCULAR; INTRAVENOUS; SUBCUTANEOUS PRN
Status: DISCONTINUED | OUTPATIENT
Start: 2024-05-21 | End: 2024-05-21

## 2024-05-21 RX ORDER — ONDANSETRON 2 MG/ML
4 INJECTION INTRAMUSCULAR; INTRAVENOUS
Status: DISCONTINUED | OUTPATIENT
Start: 2024-05-21 | End: 2024-05-21

## 2024-05-21 RX ORDER — PHENYLEPHRINE HCL IN 0.9% NACL 1 MG/10 ML
SYRINGE (ML) INTRAVENOUS PRN
Status: DISCONTINUED | OUTPATIENT
Start: 2024-05-21 | End: 2024-05-21 | Stop reason: SDUPTHER

## 2024-05-21 RX ORDER — LIDOCAINE HYDROCHLORIDE 10 MG/ML
5 INJECTION, SOLUTION EPIDURAL; INFILTRATION; INTRACAUDAL; PERINEURAL ONCE
Status: DISCONTINUED | OUTPATIENT
Start: 2024-05-21 | End: 2024-05-22 | Stop reason: HOSPADM

## 2024-05-21 RX ORDER — SODIUM CHLORIDE 0.9 % (FLUSH) 0.9 %
5-40 SYRINGE (ML) INJECTION PRN
Status: DISCONTINUED | OUTPATIENT
Start: 2024-05-21 | End: 2024-05-21

## 2024-05-21 RX ORDER — SODIUM CHLORIDE 0.9 % (FLUSH) 0.9 %
5-40 SYRINGE (ML) INJECTION EVERY 12 HOURS SCHEDULED
Status: DISCONTINUED | OUTPATIENT
Start: 2024-05-21 | End: 2024-05-21

## 2024-05-21 RX ORDER — LIDOCAINE HYDROCHLORIDE 20 MG/ML
INJECTION, SOLUTION EPIDURAL; INFILTRATION; INTRACAUDAL; PERINEURAL PRN
Status: DISCONTINUED | OUTPATIENT
Start: 2024-05-21 | End: 2024-05-21 | Stop reason: SDUPTHER

## 2024-05-21 RX ORDER — SODIUM CHLORIDE 0.9 % (FLUSH) 0.9 %
5-40 SYRINGE (ML) INJECTION PRN
Status: DISCONTINUED | OUTPATIENT
Start: 2024-05-21 | End: 2024-05-22 | Stop reason: HOSPADM

## 2024-05-21 RX ORDER — SODIUM CHLORIDE 9 MG/ML
25 INJECTION, SOLUTION INTRAVENOUS PRN
Status: DISCONTINUED | OUTPATIENT
Start: 2024-05-21 | End: 2024-05-22 | Stop reason: HOSPADM

## 2024-05-21 RX ORDER — PROPOFOL 10 MG/ML
INJECTION, EMULSION INTRAVENOUS CONTINUOUS PRN
Status: DISCONTINUED | OUTPATIENT
Start: 2024-05-21 | End: 2024-05-21 | Stop reason: SDUPTHER

## 2024-05-21 RX ORDER — SODIUM CHLORIDE 9 MG/ML
INJECTION, SOLUTION INTRAVENOUS PRN
Status: DISCONTINUED | OUTPATIENT
Start: 2024-05-21 | End: 2024-05-21

## 2024-05-21 RX ORDER — SODIUM CHLORIDE 0.9 % (FLUSH) 0.9 %
5-40 SYRINGE (ML) INJECTION EVERY 12 HOURS SCHEDULED
Status: DISCONTINUED | OUTPATIENT
Start: 2024-05-21 | End: 2024-05-22 | Stop reason: HOSPADM

## 2024-05-21 RX ADMIN — FINASTERIDE 5 MG: 5 TABLET, FILM COATED ORAL at 08:05

## 2024-05-21 RX ADMIN — METOPROLOL TARTRATE 25 MG: 25 TABLET, FILM COATED ORAL at 08:04

## 2024-05-21 RX ADMIN — Medication 100 MCG: at 15:40

## 2024-05-21 RX ADMIN — SODIUM CHLORIDE, PRESERVATIVE FREE 10 ML: 5 INJECTION INTRAVENOUS at 21:01

## 2024-05-21 RX ADMIN — PROPOFOL 150 MCG/KG/MIN: 10 INJECTION, EMULSION INTRAVENOUS at 15:24

## 2024-05-21 RX ADMIN — Medication 200 MCG: at 15:33

## 2024-05-21 RX ADMIN — Medication 200 MCG: at 15:35

## 2024-05-21 RX ADMIN — SODIUM CHLORIDE, PRESERVATIVE FREE 10 ML: 5 INJECTION INTRAVENOUS at 20:57

## 2024-05-21 RX ADMIN — PROPOFOL 50 MG: 10 INJECTION, EMULSION INTRAVENOUS at 15:25

## 2024-05-21 RX ADMIN — LIDOCAINE HYDROCHLORIDE 100 MG: 20 INJECTION, SOLUTION EPIDURAL; INFILTRATION; INTRACAUDAL; PERINEURAL at 15:24

## 2024-05-21 RX ADMIN — ERTAPENEM SODIUM 500 MG: 1 INJECTION INTRAMUSCULAR; INTRAVENOUS at 13:24

## 2024-05-21 RX ADMIN — DEXTROSE MONOHYDRATE: 50 INJECTION, SOLUTION INTRAVENOUS at 05:05

## 2024-05-21 RX ADMIN — ENOXAPARIN SODIUM 30 MG: 100 INJECTION SUBCUTANEOUS at 08:04

## 2024-05-21 RX ADMIN — HYDRALAZINE HYDROCHLORIDE 10 MG: 10 TABLET, FILM COATED ORAL at 20:57

## 2024-05-21 RX ADMIN — NIFEDIPINE 30 MG: 30 TABLET, EXTENDED RELEASE ORAL at 08:04

## 2024-05-21 RX ADMIN — METOPROLOL TARTRATE 25 MG: 25 TABLET, FILM COATED ORAL at 21:03

## 2024-05-21 RX ADMIN — TAMSULOSIN HYDROCHLORIDE 0.4 MG: 0.4 CAPSULE ORAL at 08:04

## 2024-05-21 RX ADMIN — HYDRALAZINE HYDROCHLORIDE 10 MG: 10 TABLET, FILM COATED ORAL at 08:04

## 2024-05-21 ASSESSMENT — PAIN SCALES - PAIN ASSESSMENT IN ADVANCED DEMENTIA (PAINAD)
TOTALSCORE: 0
BREATHING: NORMAL
TOTALSCORE: 0
BREATHING: NORMAL
BODYLANGUAGE: RELAXED
FACIALEXPRESSION: SMILING OR INEXPRESSIVE
BODYLANGUAGE: RELAXED
CONSOLABILITY: NO NEED TO CONSOLE
FACIALEXPRESSION: SMILING OR INEXPRESSIVE
BREATHING: NORMAL
CONSOLABILITY: NO NEED TO CONSOLE
BREATHING: NORMAL
BODYLANGUAGE: RELAXED
CONSOLABILITY: NO NEED TO CONSOLE
CONSOLABILITY: NO NEED TO CONSOLE
TOTALSCORE: 0
FACIALEXPRESSION: SMILING OR INEXPRESSIVE
FACIALEXPRESSION: SMILING OR INEXPRESSIVE
TOTALSCORE: 0

## 2024-05-21 ASSESSMENT — LIFESTYLE VARIABLES: SMOKING_STATUS: 1

## 2024-05-21 ASSESSMENT — PAIN - FUNCTIONAL ASSESSMENT
PAIN_FUNCTIONAL_ASSESSMENT: 0-10
PAIN_FUNCTIONAL_ASSESSMENT: NONE - DENIES PAIN

## 2024-05-21 ASSESSMENT — PAIN SCALES - GENERAL
PAINLEVEL_OUTOF10: 0
PAINLEVEL_OUTOF10: 0

## 2024-05-21 NOTE — OP NOTE
Endoscopy Note    Patient: Reji Kaufman  : 1940  Acct#:     Procedure: Esophagogastroduodenoscopy   Colonoscopy    Date:  2024     Surgeon:  Jose Radford MD    Referring Physician:  Dr. Chu    Indications: This is a 83 y.o. year old male who presents today with severe anemia concerning for blood loss anemia for EGD and colonoscopy.    Anesthesia:  TIVA    Description of Procedure:  Informed consent was obtained from the patient after explanation of indications, benefits and possible risks and complications of the procedure.  The patient was then taken to the endoscopy suite, placed in the left lateral decubitus position and the above IV sedation was administrered.    The Olympus videoendoscope was placed in the patient's mouth and under direct visualization passed into the esophagus. The scope was then advanced to the 2nd portion of the duodenum without difficulty. Views were good, patient toleration was good.  Retroflexion was performed in the stomach.      Findings:  1.  The esophagus was tortuous and otherwise appeared normal without evidence of Silva's esophagus or reflux esophagitis.  2.  Normal stomach and duodenum.    The scope was then withdrawn back into the stomach, it was decompressed, and the scope was completely withdrawn.    A digital rectal examination was performed and revealed negative without mass, lesions or tenderness.      The Olympus pediatric video colonoscope was placed in the patient's rectum under digital direction and advanced to the cecum. The cecum was identified by characteristic anatomy and ballottment.  The prep was poor.  With aggressive lavage, fair views were obtained. Polyps less than 7mm may have been missed.  The ileocecal valve was identified.  I was able to retroflex and look into it but not able to intubate it because of a tight angle The scope was then withdrawn back through the cecum, ascending, transverse, descending and sigmoid colons.  Careful

## 2024-05-21 NOTE — H&P
Pre-operative History and Physical    Patient: Reji Kaufman  : 1940  Acct#:     HISTORY OF PRESENT ILLNESS:    The patient is a 83 y.o. male who presents with severe anemia concerning for blood loss anemia for EGD and colonoscopy.    Past Medical History:        Diagnosis Date    Alcohol abuse     AV block, 1st degree     Hypertension       Past Surgical History:    History reviewed. No pertinent surgical history.   Medications Prior to Admission:   No current facility-administered medications on file prior to encounter.     Current Outpatient Medications on File Prior to Encounter   Medication Sig Dispense Refill    bisacodyl (DULCOLAX) 10 MG suppository Place 1 suppository rectally daily as needed for Constipation      calcium carbonate (OS-PRISCILLA) 1250 (500 Ca) MG chewable tablet Take 1 tablet by mouth every 4 hours as needed for Heartburn      Cranberry 500 MG CAPS Take 1 capsule by mouth daily      ergocalciferol (ERGOCALCIFEROL) 1.25 MG (00059 UT) capsule Take 1 capsule by mouth once a week On Friday      finasteride (PROSCAR) 5 MG tablet Take 1 tablet by mouth daily      hydrALAZINE (APRESOLINE) 10 MG tablet Take 1 tablet by mouth in the morning and at bedtime      lidocaine 4 % GEL jelly Apply topically daily as needed for Pain (lower back)      melatonin 3 MG TABS tablet Take 2 tablets by mouth nightly      mirtazapine (REMERON) 7.5 MG tablet Take 1 tablet by mouth nightly      NIFEdipine (ADALAT CC) 30 MG extended release tablet Take 1 tablet by mouth daily      polyethylene glycol (GLYCOLAX) 17 g packet Take 1 packet by mouth daily as needed for Constipation      senna (SENOKOT) 8.6 MG tablet Take 1 tablet by mouth nightly      tamsulosin (FLOMAX) 0.4 MG capsule Take 1 capsule by mouth daily      acetaminophen (TYLENOL) 325 MG tablet Take 2 tablets by mouth every 6 hours as needed for Pain      vitamin B-12 (CYANOCOBALAMIN) 500 MCG tablet Take 1 tablet by mouth daily      dextrose 5 % and 0.45 % 
appears increased along with increased nodularity in the region of the obturator internus musculature on the right. An enlarged iliac chain node on the right on image number 130 measures 2.7 cm by 2.1 cm, previously measuring 1.9 cm by 1.2 cm Peritoneum/Retroperitoneum: Atherosclerotic change seen in aorta.  Tiny retroperitoneal nodes are seen. Bones/Soft Tissues: Spurring is seen in the spine and hips.  Scattered sclerotic metastasis are seen     Trace pelvicaliectasis on the right.  Urothelial thickening is seen on the right.  Fat stranding surrounds the bladder.  Correlate with urinalysis to exclude underlying infection. Increased metastatic adenopathy in the abdomen and pelvis. Osseous metastatic disease, similar to prior     CT HEAD WO CONTRAST    Result Date: 5/14/2024  EXAMINATION: CT OF THE HEAD WITHOUT CONTRAST  5/14/2024 11:04 pm TECHNIQUE: CT of the head was performed without the administration of intravenous contrast. Automated exposure control, iterative reconstruction, and/or weight based adjustment of the mA/kV was utilized to reduce the radiation dose to as low as reasonably achievable. COMPARISON: 04/11/2024 HISTORY: ORDERING SYSTEM PROVIDED HISTORY: ams TECHNOLOGIST PROVIDED HISTORY: Has a \"code stroke\" or \"stroke alert\" been called?->No Reason for exam:->ams Reason for Exam: ams FINDINGS: BRAIN/VENTRICLES: Ventricles are midline in position.  No intracerebral masses are identified.  No mass effect.  No midline shift.  No acute intracranial hemorrhage is seen.  There is intracranial atherosclerosis. There is prominence of the sulci, compatible with atrophy.  There is moderate periventricular hypodensity seen.  Scattered additional areas of hypodensity are seen throughout the frontal and parietal white matter. ORBITS: The visualized portion of the orbits demonstrate no acute abnormality. SINUSES: No significant mastoid opacification noted.  Trace mucosal thickening seen the left maxillary sinus.

## 2024-05-21 NOTE — ANESTHESIA PRE PROCEDURE
Abdominal:             Vascular:          Other Findings:             Anesthesia Plan      MAC     ASA 3     (Consent obtained via phone from son/POA Sanjay Kaufman )  Induction: intravenous.      Anesthetic plan and risks discussed with patient, healthcare power of  and child/children.      Plan discussed with CRNA.                    Valerie Howard MD   5/21/2024

## 2024-05-21 NOTE — ANESTHESIA POSTPROCEDURE EVALUATION
Department of Anesthesiology  Postprocedure Note    Patient: Reji Kaufman  MRN: [id number]  YOB: 1940  Date of evaluation: 5/21/2024    Procedure Summary       Date: 05/21/24 Room / Location: [place] [address] / [facility]    Anesthesia Start: 1517 Anesthesia Stop: 1615    Procedures:       ESOPHAGOGASTRODUODENOSCOPY      COLONOSCOPY Diagnosis:       Anemia, unspecified type      (Anemia, unspecified type [D64.9])    Surgeons: Jose Radford MD Responsible Provider: Valerie Howard MD    Anesthesia Type: MAC ASA Status: 3            Anesthesia Type: No value filed.    Sohail Phase I: Sohail Score: 10    Sohail Phase II:      Anesthesia Post Evaluation    Patient location during evaluation: PACU  Patient participation: complete - patient participated  Level of consciousness: awake  Airway patency: patent  Nausea & Vomiting: no nausea and no vomiting  Cardiovascular status: hemodynamically stable  Respiratory status: acceptable  Hydration status: stable  Pain management: adequate        No notable events documented.

## 2024-05-21 NOTE — ADDENDUM NOTE
Addendum  created 05/21/24 1710 by Valerie Howard MD    Intraprocedure Event edited, Intraprocedure Meds edited

## 2024-05-22 ENCOUNTER — APPOINTMENT (OUTPATIENT)
Dept: GENERAL RADIOLOGY | Age: 84
DRG: 698 | End: 2024-05-22
Payer: MEDICARE

## 2024-05-22 VITALS
OXYGEN SATURATION: 99 % | SYSTOLIC BLOOD PRESSURE: 148 MMHG | RESPIRATION RATE: 16 BRPM | HEART RATE: 98 BPM | TEMPERATURE: 98.5 F | WEIGHT: 186.73 LBS | HEIGHT: 72 IN | BODY MASS INDEX: 25.29 KG/M2 | DIASTOLIC BLOOD PRESSURE: 62 MMHG

## 2024-05-22 LAB
ALBUMIN SERPL-MCNC: 2.2 G/DL (ref 3.4–5)
ANION GAP SERPL CALCULATED.3IONS-SCNC: 13 MMOL/L (ref 3–16)
BUN SERPL-MCNC: 32 MG/DL (ref 7–20)
CALCIUM SERPL-MCNC: 8.6 MG/DL (ref 8.3–10.6)
CHLORIDE SERPL-SCNC: 108 MMOL/L (ref 99–110)
CO2 SERPL-SCNC: 22 MMOL/L (ref 21–32)
CREAT SERPL-MCNC: 1.8 MG/DL (ref 0.8–1.3)
DEPRECATED RDW RBC AUTO: 16.9 % (ref 12.4–15.4)
GFR SERPLBLD CREATININE-BSD FMLA CKD-EPI: 37 ML/MIN/{1.73_M2}
GLUCOSE SERPL-MCNC: 87 MG/DL (ref 70–99)
HCT VFR BLD AUTO: 25.7 % (ref 40.5–52.5)
HGB BLD-MCNC: 8.4 G/DL (ref 13.5–17.5)
MCH RBC QN AUTO: 25.9 PG (ref 26–34)
MCHC RBC AUTO-ENTMCNC: 32.8 G/DL (ref 31–36)
MCV RBC AUTO: 79.2 FL (ref 80–100)
PHOSPHATE SERPL-MCNC: 4.6 MG/DL (ref 2.5–4.9)
PLATELET # BLD AUTO: 296 K/UL (ref 135–450)
PMV BLD AUTO: 10.4 FL (ref 5–10.5)
POTASSIUM SERPL-SCNC: 3.9 MMOL/L (ref 3.5–5.1)
RBC # BLD AUTO: 3.25 M/UL (ref 4.2–5.9)
SODIUM SERPL-SCNC: 143 MMOL/L (ref 136–145)
WBC # BLD AUTO: 8.5 K/UL (ref 4–11)

## 2024-05-22 PROCEDURE — 94760 N-INVAS EAR/PLS OXIMETRY 1: CPT

## 2024-05-22 PROCEDURE — 71045 X-RAY EXAM CHEST 1 VIEW: CPT

## 2024-05-22 PROCEDURE — 36415 COLL VENOUS BLD VENIPUNCTURE: CPT

## 2024-05-22 PROCEDURE — 6360000002 HC RX W HCPCS: Performed by: INTERNAL MEDICINE

## 2024-05-22 PROCEDURE — 2580000003 HC RX 258: Performed by: INTERNAL MEDICINE

## 2024-05-22 PROCEDURE — 85027 COMPLETE CBC AUTOMATED: CPT

## 2024-05-22 PROCEDURE — 80069 RENAL FUNCTION PANEL: CPT

## 2024-05-22 PROCEDURE — 05HY33Z INSERTION OF INFUSION DEVICE INTO UPPER VEIN, PERCUTANEOUS APPROACH: ICD-10-PCS | Performed by: STUDENT IN AN ORGANIZED HEALTH CARE EDUCATION/TRAINING PROGRAM

## 2024-05-22 PROCEDURE — C1751 CATH, INF, PER/CENT/MIDLINE: HCPCS

## 2024-05-22 PROCEDURE — 36569 INSJ PICC 5 YR+ W/O IMAGING: CPT

## 2024-05-22 PROCEDURE — 76937 US GUIDE VASCULAR ACCESS: CPT

## 2024-05-22 PROCEDURE — 6370000000 HC RX 637 (ALT 250 FOR IP): Performed by: INTERNAL MEDICINE

## 2024-05-22 RX ADMIN — ERTAPENEM SODIUM 500 MG: 1 INJECTION INTRAMUSCULAR; INTRAVENOUS at 12:57

## 2024-05-22 RX ADMIN — ENOXAPARIN SODIUM 30 MG: 100 INJECTION SUBCUTANEOUS at 09:43

## 2024-05-22 RX ADMIN — SODIUM CHLORIDE, PRESERVATIVE FREE 10 ML: 5 INJECTION INTRAVENOUS at 09:46

## 2024-05-22 RX ADMIN — TAMSULOSIN HYDROCHLORIDE 0.4 MG: 0.4 CAPSULE ORAL at 09:43

## 2024-05-22 RX ADMIN — FINASTERIDE 5 MG: 5 TABLET, FILM COATED ORAL at 09:45

## 2024-05-22 ASSESSMENT — PAIN SCALES - PAIN ASSESSMENT IN ADVANCED DEMENTIA (PAINAD)
TOTALSCORE: 0
BREATHING: NORMAL
CONSOLABILITY: NO NEED TO CONSOLE
BODYLANGUAGE: RELAXED
BREATHING: NORMAL
CONSOLABILITY: NO NEED TO CONSOLE
FACIALEXPRESSION: SMILING OR INEXPRESSIVE
BODYLANGUAGE: RELAXED
BREATHING: NORMAL
BODYLANGUAGE: RELAXED
FACIALEXPRESSION: SMILING OR INEXPRESSIVE
FACIALEXPRESSION: SMILING OR INEXPRESSIVE
BREATHING: NORMAL
CONSOLABILITY: NO NEED TO CONSOLE
CONSOLABILITY: NO NEED TO CONSOLE
TOTALSCORE: 0
TOTALSCORE: 0
BREATHING: NORMAL
TOTALSCORE: 0
FACIALEXPRESSION: SMILING OR INEXPRESSIVE
CONSOLABILITY: NO NEED TO CONSOLE
BODYLANGUAGE: RELAXED
TOTALSCORE: 0
FACIALEXPRESSION: SMILING OR INEXPRESSIVE
FACIALEXPRESSION: SMILING OR INEXPRESSIVE
TOTALSCORE: 0
BODYLANGUAGE: RELAXED
BREATHING: NORMAL
BODYLANGUAGE: RELAXED
CONSOLABILITY: NO NEED TO CONSOLE

## 2024-05-22 ASSESSMENT — PAIN SCALES - GENERAL
PAINLEVEL_OUTOF10: 0

## 2024-05-22 ASSESSMENT — PAIN SCALES - WONG BAKER
WONGBAKER_NUMERICALRESPONSE: NO HURT

## 2024-05-22 NOTE — PLAN OF CARE
Problem: Safety - Adult  Goal: Free from fall injury  5/16/2024 1150 by Radha Avila, RN  Outcome: Progressing     Problem: Discharge Planning  Goal: Discharge to home or other facility with appropriate resources  5/16/2024 1150 by Radha Avila, RN  Outcome: Progressing     Problem: Skin/Tissue Integrity  Goal: Absence of new skin breakdown  5/16/2024 1150 by Radha Avila, RN  Outcome: Progressing     Problem: Nutrition Deficit:  Goal: Optimize nutritional status  Outcome: Progressing     
  Problem: Safety - Adult  Goal: Free from fall injury  5/16/2024 2202 by Cong Whitaker, RN  Outcome: Progressing     Problem: Discharge Planning  Goal: Discharge to home or other facility with appropriate resources  5/16/2024 2202 by Cong Whitaker, RN  Outcome: Progressing  Flowsheets (Taken 5/16/2024 2202)  Discharge to home or other facility with appropriate resources: Identify discharge learning needs (meds, wound care, etc)     Problem: Nutrition Deficit:  Goal: Optimize nutritional status  5/16/2024 2202 by Cong Whitaker, RN  Outcome: Progressing     
  Problem: Safety - Adult  Goal: Free from fall injury  5/17/2024 2104 by Shell Vizcaino RN  Outcome: Progressing  5/17/2024 1838 by Vijaya Fournier RN  Outcome: Progressing     Problem: Discharge Planning  Goal: Discharge to home or other facility with appropriate resources  5/17/2024 2104 by Shell Vizcaino RN  Outcome: Progressing  5/17/2024 1838 by Vijaya Fournier RN  Outcome: Progressing     Problem: Skin/Tissue Integrity  Goal: Absence of new skin breakdown  Description: 1.  Monitor for areas of redness and/or skin breakdown  2.  Assess vascular access sites hourly  3.  Every 4-6 hours minimum:  Change oxygen saturation probe site  4.  Every 4-6 hours:  If on nasal continuous positive airway pressure, respiratory therapy assess nares and determine need for appliance change or resting period.  5/17/2024 2104 by Shell Vizcaino RN  Outcome: Progressing  5/17/2024 1838 by Vijaya Fournier RN  Outcome: Progressing     Problem: Nutrition Deficit:  Goal: Optimize nutritional status  5/17/2024 2104 by Shell Vizcaino RN  Outcome: Progressing  5/17/2024 1838 by Vijaya Fournier RN  Outcome: Progressing  Flowsheets (Taken 5/17/2024 1120 by Ellis Hester RD)  Nutrient intake appropriate for improving, restoring, or maintaining nutritional needs: Recommend appropriate diets, oral nutritional supplements, and vitamin/mineral supplements     Problem: Pain  Goal: Verbalizes/displays adequate comfort level or baseline comfort level  5/17/2024 2104 by Shell Vizcaino RN  Outcome: Progressing  5/17/2024 1838 by Vijaya Fournier RN  Outcome: Progressing     
  Problem: Safety - Adult  Goal: Free from fall injury  5/18/2024 2101 by Shell Vizcaino RN  Outcome: Progressing  5/18/2024 1605 by Vijaya Fournier RN  Outcome: Progressing     Problem: Discharge Planning  Goal: Discharge to home or other facility with appropriate resources  5/18/2024 2101 by Shell Vizcaino RN  Outcome: Progressing  5/18/2024 1605 by Vijaya Fournier RN  Outcome: Progressing     Problem: Skin/Tissue Integrity  Goal: Absence of new skin breakdown  Description: 1.  Monitor for areas of redness and/or skin breakdown  2.  Assess vascular access sites hourly  3.  Every 4-6 hours minimum:  Change oxygen saturation probe site  4.  Every 4-6 hours:  If on nasal continuous positive airway pressure, respiratory therapy assess nares and determine need for appliance change or resting period.  5/18/2024 2101 by Shell Vizcaino RN  Outcome: Progressing  5/18/2024 1605 by Vijaya Fournier RN  Outcome: Progressing     Problem: Nutrition Deficit:  Goal: Optimize nutritional status  5/18/2024 2101 by Shell Vizcaino RN  Outcome: Progressing  5/18/2024 1605 by Vijaya Fournier RN  Outcome: Progressing     Problem: Pain  Goal: Verbalizes/displays adequate comfort level or baseline comfort level  5/18/2024 2101 by Shell Vizcaino RN  Outcome: Progressing  5/18/2024 1605 by Vijaya Fournier RN  Outcome: Progressing     
  Problem: Safety - Adult  Goal: Free from fall injury  5/20/2024 1103 by Gem Lennon RN  Outcome: Progressing  5/20/2024 0057 by Shell Vizcaino RN  Outcome: Progressing  Flowsheets (Taken 5/19/2024 1325 by Lamar Gaspar, RN)  Free From Fall Injury: Instruct family/caregiver on patient safety     Problem: Skin/Tissue Integrity  Goal: Absence of new skin breakdown  Description: 1.  Monitor for areas of redness and/or skin breakdown  2.  Assess vascular access sites hourly  3.  Every 4-6 hours minimum:  Change oxygen saturation probe site  4.  Every 4-6 hours:  If on nasal continuous positive airway pressure, respiratory therapy assess nares and determine need for appliance change or resting period.  5/20/2024 1103 by Gem Lennon RN  Outcome: Progressing  5/20/2024 0057 by Shell Vizcaino RN  Outcome: Progressing     Problem: Nutrition Deficit:  Goal: Optimize nutritional status  5/20/2024 1103 by Gem Lennon RN  Outcome: Progressing  5/20/2024 0057 by Shell Vizcaino RN  Outcome: Progressing     Problem: Neurosensory - Adult  Goal: Achieves stable or improved neurological status  5/20/2024 1103 by Gem Lennon RN  Outcome: Progressing  5/20/2024 0057 by Shell Vizcaino RN  Outcome: Progressing  Flowsheets (Taken 5/19/2024 1325 by Lamar Gaspar, RN)  Achieves stable or improved neurological status: Assess for and report changes in neurological status     Problem: Respiratory - Adult  Goal: Achieves optimal ventilation and oxygenation  5/20/2024 1103 by Gem Lennon RN  Outcome: Progressing  5/20/2024 0057 by Shell Vizcaino RN  Outcome: Progressing  Flowsheets (Taken 5/19/2024 1325 by Lamar Gaspar, RN)  Achieves optimal ventilation and oxygenation:   Assess for changes in respiratory status   Assess for changes in mentation and behavior   Position to facilitate oxygenation and minimize respiratory effort   Oxygen supplementation based on oxygen saturation or arterial blood gases     Problem: 
  Problem: Safety - Adult  Goal: Free from fall injury  Outcome: Progressing     
  Problem: Safety - Adult  Goal: Free from fall injury  Outcome: Progressing     Problem: Discharge Planning  Goal: Discharge to home or other facility with appropriate resources  Outcome: Progressing     Problem: Skin/Tissue Integrity  Goal: Absence of new skin breakdown  Description: 1.  Monitor for areas of redness and/or skin breakdown  2.  Assess vascular access sites hourly  3.  Every 4-6 hours minimum:  Change oxygen saturation probe site  4.  Every 4-6 hours:  If on nasal continuous positive airway pressure, respiratory therapy assess nares and determine need for appliance change or resting period.  Outcome: Progressing     Problem: Nutrition Deficit:  Goal: Optimize nutritional status  Outcome: Progressing     Problem: Pain  Goal: Verbalizes/displays adequate comfort level or baseline comfort level  Outcome: Progressing     
  Problem: Safety - Adult  Goal: Free from fall injury  Outcome: Progressing     Problem: Discharge Planning  Goal: Discharge to home or other facility with appropriate resources  Outcome: Progressing     Problem: Skin/Tissue Integrity  Goal: Absence of new skin breakdown  Description: 1.  Monitor for areas of redness and/or skin breakdown  2.  Assess vascular access sites hourly  3.  Every 4-6 hours minimum:  Change oxygen saturation probe site  4.  Every 4-6 hours:  If on nasal continuous positive airway pressure, respiratory therapy assess nares and determine need for appliance change or resting period.  Outcome: Progressing     Problem: Nutrition Deficit:  Goal: Optimize nutritional status  Outcome: Progressing  Nutrient intake appropriate for improving, restoring, or maintaining nutritional needs: Recommend appropriate diets, oral nutritional supplements, and vitamin/mineral supplements     Problem: Pain  Goal: Verbalizes/displays adequate comfort level or baseline comfort level  Outcome: Progressing     
  Problem: Safety - Adult  Goal: Free from fall injury  Outcome: Progressing  Flowsheets (Taken 5/19/2024 1325)  Free From Fall Injury: Instruct family/caregiver on patient safety     Problem: Discharge Planning  Goal: Discharge to home or other facility with appropriate resources  Outcome: Progressing  Flowsheets (Taken 5/19/2024 0919)  Discharge to home or other facility with appropriate resources:   Identify barriers to discharge with patient and caregiver   Arrange for needed discharge resources and transportation as appropriate   Identify discharge learning needs (meds, wound care, etc)     Problem: Skin/Tissue Integrity  Goal: Absence of new skin breakdown  Description: 1.  Monitor for areas of redness and/or skin breakdown  2.  Assess vascular access sites hourly  3.  Every 4-6 hours minimum:  Change oxygen saturation probe site  4.  Every 4-6 hours:  If on nasal continuous positive airway pressure, respiratory therapy assess nares and determine need for appliance change or resting period.  Outcome: Progressing     Problem: Nutrition Deficit:  Goal: Optimize nutritional status  Outcome: Progressing  Flowsheets (Taken 5/17/2024 1120 by Ellis Hester RD)  Nutrient intake appropriate for improving, restoring, or maintaining nutritional needs: Recommend appropriate diets, oral nutritional supplements, and vitamin/mineral supplements     Problem: Pain  Goal: Verbalizes/displays adequate comfort level or baseline comfort level  Outcome: Progressing  Flowsheets (Taken 5/19/2024 1325)  Verbalizes/displays adequate comfort level or baseline comfort level:   Encourage patient to monitor pain and request assistance   Assess pain using appropriate pain scale   Administer analgesics based on type and severity of pain and evaluate response     Problem: Neurosensory - Adult  Goal: Achieves stable or improved neurological status  Outcome: Progressing  Flowsheets (Taken 5/19/2024 1325)  Achieves stable or improved 
  Problem: Skin/Tissue Integrity  Goal: Absence of new skin breakdown  Description: 1.  Monitor for areas of redness and/or skin breakdown  2.  Assess vascular access sites hourly  3.  Every 4-6 hours minimum:  Change oxygen saturation probe site  4.  Every 4-6 hours:  If on nasal continuous positive airway pressure, respiratory therapy assess nares and determine need for appliance change or resting period.  5/16/2024 2203 by Cong Whitaker RN  Outcome: Progressing     
reality reorientation, refocusing and direction   Decrease environmental stimuli, including noise as appropriate   Monitor and intervene to maintain adequate nutrition, hydration, elimination, sleep and activity   If unable to ensure safety without constant attention obtain sitter and review sitter guidelines with assigned personnel   Initiate Psychosocial Clinical Nurse Specialist and Spiritual Care consult, as indicated     Problem: Behavior  Goal: Pt/Family maintain appropriate behavior and adhere to behavioral management agreement, if implemented  Description: INTERVENTIONS:  1. Assess patient/family's coping skills and  non-compliant behavior (including use of illegal substances)  2. Notify security of behavior or suspected illegal substances which indicate the need for search of the family and/or belongings  3. Encourage verbalization of thoughts and concerns in a socially appropriate manner  4. Utilize positive, consistent limit setting strategies supporting safety of patient, staff and others  5. Encourage participation in the decision making process about the behavioral management agreement  6. If a visitor's behavior poses a threat to safety call refer to organization policy.  7. Initiate consult with , Psychosocial CNS, Spiritual Care as appropriate  Outcome: Progressing  Flowsheets (Taken 5/20/2024 3861)  Patient/family maintains appropriate behavior and adheres to behavioral management agreement, if implemented:   Assess patient/family’s coping skills and  non-compliant behavior (including use of illegal substances)   Notify security of behavior or suspected illegal substances which indicate the need for search of the patient and/or belongings   Encourage verbalization of thoughts and concerns in a socially appropriate manner   Utilize positive, consistent limit setting strategies supporting safety of patient, staff and others   Encourage participation in the decision making process about 
patient/family's coping skills and  non-compliant behavior (including use of illegal substances)  2. Notify security of behavior or suspected illegal substances which indicate the need for search of the family and/or belongings  3. Encourage verbalization of thoughts and concerns in a socially appropriate manner  4. Utilize positive, consistent limit setting strategies supporting safety of patient, staff and others  5. Encourage participation in the decision making process about the behavioral management agreement  6. If a visitor's behavior poses a threat to safety call refer to organization policy.  7. Initiate consult with , Psychosocial CNS, Spiritual Care as appropriate  Outcome: Progressing  Flowsheets (Taken 5/21/2024 2055)  Patient/family maintains appropriate behavior and adheres to behavioral management agreement, if implemented:   Assess patient/family’s coping skills and  non-compliant behavior (including use of illegal substances)   Notify security of behavior or suspected illegal substances which indicate the need for search of the patient and/or belongings   Encourage verbalization of thoughts and concerns in a socially appropriate manner   Utilize positive, consistent limit setting strategies supporting safety of patient, staff and others   Encourage participation in the decision making process about the behavioral management agreement   Implement a Health Care Agreement if patient meets criteria   If a patient’s behavior jeopardizes the safety of the patient, staff, or others refer to organization policy. If a visitor’s behavior poses a threat to safety refer to organization policy   Initiate consult with , Psychosocial Clinical Nurse Specialist, Spiritual Care as appropriate     Problem: ABCDS Injury Assessment  Goal: Absence of physical injury  Outcome: Progressing     
sitter guidelines with assigned personnel   Initiate Psychosocial Clinical Nurse Specialist and Spiritual Care consult, as indicated     Problem: Behavior  Goal: Pt/Family maintain appropriate behavior and adhere to behavioral management agreement, if implemented  Description: INTERVENTIONS:  1. Assess patient/family's coping skills and  non-compliant behavior (including use of illegal substances)  2. Notify security of behavior or suspected illegal substances which indicate the need for search of the family and/or belongings  3. Encourage verbalization of thoughts and concerns in a socially appropriate manner  4. Utilize positive, consistent limit setting strategies supporting safety of patient, staff and others  5. Encourage participation in the decision making process about the behavioral management agreement  6. If a visitor's behavior poses a threat to safety call refer to organization policy.  7. Initiate consult with , Psychosocial CNS, Spiritual Care as appropriate  5/21/2024 0423 by Elizabeth Elias RN  Outcome: Progressing  Flowsheets (Taken 5/20/2024 0237)  Patient/family maintains appropriate behavior and adheres to behavioral management agreement, if implemented:   Assess patient/family’s coping skills and  non-compliant behavior (including use of illegal substances)   Notify security of behavior or suspected illegal substances which indicate the need for search of the patient and/or belongings   Encourage verbalization of thoughts and concerns in a socially appropriate manner   Utilize positive, consistent limit setting strategies supporting safety of patient, staff and others   Encourage participation in the decision making process about the behavioral management agreement   Implement a Health Care Agreement if patient meets criteria   If a patient’s behavior jeopardizes the safety of the patient, staff, or others refer to organization policy. If a visitor’s behavior poses a threat to safety 
Shell Vizcaino, RN  Outcome: Progressing  Flowsheets (Taken 5/19/2024 1325 by Lamar Gaspar, RN)  Patient/family maintains appropriate behavior and adheres to behavioral management agreement, if implemented: Assess patient/family’s coping skills and  non-compliant behavior (including use of illegal substances)  5/19/2024 1325 by Lamar Gaspar, RN  Outcome: Progressing  Flowsheets (Taken 5/19/2024 1325)  Patient/family maintains appropriate behavior and adheres to behavioral management agreement, if implemented: Assess patient/family’s coping skills and  non-compliant behavior (including use of illegal substances)

## 2024-05-22 NOTE — CARE COORDINATION
Case Management Discharge Note          Date / Time of Note: 5/22/2024 1:30 PM                  Patient Name: Reji Kaufman   YOB: 1940  Diagnosis: Hypernatremia [E87.0]  UTI (urinary tract infection) [N39.0]  Septicemia (HCC) [A41.9]  EVELINA (acute kidney injury) ([facility]) [N17.9]  Altered mental status, unspecified altered mental status type [R41.82]  Pneumonia due to infectious organism, unspecified laterality, unspecified part of lung [J18.9]  Urinary tract infection associated with indwelling urethral catheter, initial encounter ([facility]) [T83.511A, N39.0]   Date / Time: 5/14/2024 10:38 PM    Financial:  Payor: MEDICARE / Plan: MEDICARE PART A AND B / Product Type: *No Product type* /      Pharmacy:    [facility] [address] - P [phone] - F [phone]  [address] [place] [address]  Phone: [phone] Fax: [phone]      Assistance purchasing medications?: Potential Assistance Purchasing Medications: No  Assistance provided by Case Management: None at this time    DISCHARGE Disposition: Long Term Care Facility (LTC)    Nursing Facility:   Discharging to Facility/ Agency   Name: [facility]  Address:  [address] [place], [place] [id number]   Phone:  [phone]     LOC at discharge: Long Term Care  AGUILAR Completed: Yes              Notification completed in HENS/PAS?:  Not Applicable    Transportation:  Transportation PLAN for discharge: EMS transportation   Mode of Transport: Ambulance stretcher - [facility] [place]  Reason for medical transport: Bed confined: Meets the following criteria 1) unable to get out of bed without assistance or ambulate, 2) unable to safely sit up in a wheelchair, 3) unable to maintain erect seating position in a chair for time needed for transport  Name of Transport Company: [facility]  Phone: [phone]  Time of Transport: 4 pm    Transport form completed: Yes    IMM Completed:

## 2024-05-22 NOTE — PROGRESS NOTES
Infectious Diseases Inpatient Progress Note        CHIEF COMPLAINT:     Sepsis  Klebsiella Bacteremia  UTI  EVELINA  Metastatic Prostate cancer     HISTORY OF PRESENT ILLNESS:  83 y.o. old man with history of dementia admitted from nursing facility secondary to not feeling well increasing confusion.  Patient has a chronic Rose catheter in place for obstructive symptoms secondary to prostate issues.  He was diagnosed to have urinary retention when he was admitted recently to [facility] requiring  Rose  catheter placement as well as a diagnosed metastatic prostate cancer.  He is following with oncology [facility] was recently started on Lupron injection.  Due to recurrent bladder outlet obstruction he had a chronic Rose catheter placed.  He is now admitted to the hospital with change in mental status fever tachycardia found to be in sepsis.  Labs indicated sodium 167 creatinine 4.1 lactic acid 2.1 procalcitonin 9.10 WBC 19.6 hemoglobin 8.  Blood culture now positive for Klebsiella pneumonia ESBL urine culture positive for Klebsiella pneumonia, Tmax 100.9.  We are consulted for antibiotic recommendations      Interval History : Tolerating antibiotic therapy okay no history from patient secondary to dementia Rose catheter in place working well WBC still elevated creatinine slowly trending down follow-up blood cultures so far negative    Past Medical History:    Past Medical History:   Diagnosis Date    Alcohol abuse     AV block, 1st degree     Hypertension        Past Surgical History:    No past surgical history on file.    Current Medications:    No outpatient medications have been marked as taking for the 5/14/24 encounter (Hospital Encounter).       Allergies:  Patient has no known allergies.    Immunizations :   There is no immunization history on file for this patient.      Social History:    Social History     Tobacco Use    Smoking status: Some Days     Types: Cigarettes    Smokeless tobacco: Former 
      V2.[place]    [facility] [place]      Name:  Reji Kaufman /Age/Sex: 1940  (83 y.o. male)   MRN & CSN:  [id number] & [id number] Encounter Date/Time: 2024 11:22 AM EDT   Location:  E7W-6834/5119-01 PCP: Luis Felipe [place] MD     Attending:Martha Chu MD       Hospital Day: 5    Assessment and Recommendations   Reji Kaufman is a 83 y.o. male with pmh of  who presents with UTI (urinary tract infection)    Altered mental status in the setting of pneumonia and UTI started on antibiotics vancomycin and cefepime de-escalate based on culture results.  IV fluid hydration          AMS improving     GNR Bacteremia : cont IV Meropenem , ID consulted , Antibiotics as per ID  Repeat blood cx  : NGTD            Severe hyperchloremic hypernatremia likely in the setting of poor oral intake.  To be admitted to the PCU.  BMP every 4 hours.  Start LR at 100 mm/h.  Nephrology to be consulted.  Will order serum and urine osmolality along with a urine chloride urine sodium        Hypernatremia : improved      IVF as per Nephrology    Vascular dementia  History of metastatic prostate cancer will consult urology as well  5 GNR bacteremia :    Cont IV Meropenem, repeat blood cx , ID consult    6 Anemia    Check stool for occult blood    Discussed with patient and his son Sanjay,  consent obtained    Transfuse 1 U PRBC prn      8 Right hip Pain      Orthopedics consulted    CT right hip ordered     F/u orthopedics recommendation     DC plan    > 48 hrs    Diet ADULT DIET; Regular  ADULT ORAL NUTRITION SUPPLEMENT; Lunch, Dinner; Frozen Oral Supplement  ADULT ORAL NUTRITION SUPPLEMENT; Breakfast, Lunch; Standard High Calorie/High Protein Oral Supplement   DVT Prophylaxis [] Lovenox, []  Heparin, [] SCDs, [] Ambulation,  [] Eliquis, [] Xarelto  [] Coumadin   Code Status Full Code   Disposition From:   Expected Disposition:   Estimated Date of Discharge:   Patient requires continued admission due to    Surrogate Decision 
      V2.0    [facility] [place] Progress Note      Name:  Reji Kaufman /Age/Sex: 1940  (83 y.o. male)   MRN & CSN:  [id number] & [id number] Encounter Date/Time: 2024 11:22 AM EDT   Location:  G1X-3163/5119-01 PCP: Luis Felipe [place] MD     Attending:Martha Chu MD       Hospital Day: 8    Assessment and Recommendations   Reji Kaufman is a 83 y.o. male with pmh of  who presents with UTI (urinary tract infection)    Altered mental status in the setting of pneumonia and UTI started on antibiotics vancomycin and cefepime de-escalate based on culture results.  IV fluid hydration          AMS improved    GNR Bacteremia : cont IV Meropenem , ID consulted , Antibiotics as per ID  Repeat blood cx  : NGTD            2 Hypernatremia     Hypernatremia : improved  IVF as per Nephrology    Vascular dementia  History of metastatic prostate cancer will consult urology as well    5 GNR bacteremia :    Cont IV Meropenem, repeat blood cx , ID consult    ID finalized AGUILAR    6 Anemia : for EGD /colonoscopy on     Hb stable    GI consulted     Check stool for occult blood      Transfuse 1 U PRBC  on       8 Right hip Pain due to osteoarthritis     CT hip neg for #      Orthopedics consulted    F/u orthopedics recommendation     DC plan    1-2 days    Diet Diet NPO   DVT Prophylaxis [] Lovenox, []  Heparin, [] SCDs, [] Ambulation,  [] Eliquis, [] Xarelto  [] Coumadin   Code Status Full Code   Disposition From:   Expected Disposition:   Estimated Date of Discharge:   Patient requires continued admission due to    Surrogate Decision Maker/ POA       Personally reviewed Lab Studies and Imaging   r     Medical Decision Making:  The following items were considered in medical decision making:  Discussion of patient care with other providers  Reviewed clinical lab tests  Reviewed radiology tests  Reviewed other diagnostic tests/interventions  Independent review of radiologic images  Microbiology cultures and other micro tests 
      V2.0    [facility] [place] Progress Note      Name:  Reji Kaufman /Age/Sex: 1940  (83 y.o. male)   MRN & CSN:  [id number] & [id number] Encounter Date/Time: 2024 11:22 AM EDT   Location:  O2M-6671/5119-01 PCP: Luis Felipe [place] MD     Attending:Martha Chu MD       Hospital Day: 3    Assessment and Recommendations   Reji Kaufman is a 83 y.o. male with pmh of  who presents with UTI (urinary tract infection)    Altered mental status in the setting of pneumonia and UTI started on antibiotics vancomycin and cefepime de-escalate based on culture results.  IV fluid hydration          AMS improving             Severe hyperchloremic hypernatremia likely in the setting of poor oral intake.  To be admitted to the PCU.  BMP every 4 hours.  Start LR at 100 mm/h.  Nephrology to be consulted.  Will order serum and urine osmolality along with a urine chloride urine sodium      Serum Na trending down    IVF as per Nephrology    Vascular dementia  History of metastatic prostate cancer will consult urology as well  5 GNR bacteremia :    Cont IV Meropenem, repeat blood cx , ID consult    6 Anemia    Check stool for occult blood    Discussed with patient and his son Sanjay,  consent obtained    Transfuse 1 U PRBC         Diet ADULT DIET; Regular  ADULT ORAL NUTRITION SUPPLEMENT; Breakfast, Lunch, Dinner; Standard High Calorie/High Protein Oral Supplement  ADULT ORAL NUTRITION SUPPLEMENT; Lunch, Dinner; Frozen Oral Supplement   DVT Prophylaxis [] Lovenox, []  Heparin, [] SCDs, [] Ambulation,  [] Eliquis, [] Xarelto  [] Coumadin   Code Status Full Code   Disposition From:   Expected Disposition:   Estimated Date of Discharge:   Patient requires continued admission due to    Surrogate Decision Maker/ POA       Personally reviewed Lab Studies and Imaging   r     Medical Decision Making:  The following items were considered in medical decision making:  Discussion of patient care with other providers  Reviewed clinical lab 
      V2.0    [facility] [place] Progress Note      Name:  Reji Kaufman /Age/Sex: 1940  (83 y.o. male)   MRN & CSN:  [id number] & [id number] Encounter Date/Time: 2024 11:22 AM EDT   Location:  [id number]-9123/5119-01 PCP: Luis Felipe [place] MD     Attending:Martha Chu MD       Hospital Day: 6    Assessment and Recommendations   Reji Kaufman is a 83 y.o. male with pmh of  who presents with UTI (urinary tract infection)    Altered mental status in the setting of pneumonia and UTI started on antibiotics vancomycin and cefepime de-escalate based on culture results.  IV fluid hydration          AMS improving     GNR Bacteremia : cont IV Meropenem , ID consulted , Antibiotics as per ID  Repeat blood cx  : NGTD            Severe hyperchloremic hypernatremia likely in the setting of poor oral intake.  To be admitted to the PCU.  BMP every 4 hours.  Start LR at 100 mm/h.  Nephrology to be consulted.  Will order serum and urine osmolality along with a urine chloride urine sodium        Hypernatremia : improved      IVF as per Nephrology    Vascular dementia  History of metastatic prostate cancer will consult urology as well  5 GNR bacteremia :    Cont IV Meropenem, repeat blood cx , ID consult    6 Anemia :     Hb stable    Check stool for occult blood    Discussed with patient and his son Sanjay,  consent obtained    Transfuse 1 U PRBC prn      8 Right hip Pain due to osteoarthritis     CT hip neg for #      Orthopedics consulted    F/u orthopedics recommendation     DC plan    > 48 hrs    Diet ADULT DIET; Regular  ADULT ORAL NUTRITION SUPPLEMENT; Lunch, Dinner; Frozen Oral Supplement  ADULT ORAL NUTRITION SUPPLEMENT; Breakfast, Lunch; Standard High Calorie/High Protein Oral Supplement   DVT Prophylaxis [] Lovenox, []  Heparin, [] SCDs, [] Ambulation,  [] Eliquis, [] Xarelto  [] Coumadin   Code Status Full Code   Disposition From:   Expected Disposition:   Estimated Date of Discharge:   Patient requires continued admission 
      V2.0    [facility] [place]      Name:  Reji Kaufman /Age/Sex: 1940  (83 y.o. male)   MRN & CSN:  [id number] & [id number] Encounter Date/Time: 2024 11:22 AM EDT   Location:  U9F-4268/5119-01 PCP: Luis Felipe [place] MD     Attending:Martha Chu MD       Hospital Day: 7    Assessment and Recommendations   Reji Kaufman is a 83 y.o. male with pmh of  who presents with UTI (urinary tract infection)    Altered mental status in the setting of pneumonia and UTI started on antibiotics vancomycin and cefepime de-escalate based on culture results.  IV fluid hydration          AMS improved    GNR Bacteremia : cont IV Meropenem , ID consulted , Antibiotics as per ID  Repeat blood cx  : NGTD            Severe hyperchloremic hypernatremia likely in the setting of poor oral intake.  To be admitted to the PCU.  BMP every 4 hours.  Start LR at 100 mm/h.  Nephrology to be consulted.  Will order serum and urine osmolality along with a urine chloride urine sodium        Hypernatremia : improved      IVF as per Nephrology    Vascular dementia  History of metastatic prostate cancer will consult urology as well  5 GNR bacteremia :    Cont IV Meropenem, repeat blood cx , ID consult    6 Anemia :     Hb stable    GI consulted     Check stool for occult blood      Transfuse 1 U PRBC  on       8 Right hip Pain due to osteoarthritis     CT hip neg for #      Orthopedics consulted    F/u orthopedics recommendation     DC plan    1-2 days    Diet ADULT DIET; Regular  ADULT ORAL NUTRITION SUPPLEMENT; Lunch, Dinner; Frozen Oral Supplement  ADULT ORAL NUTRITION SUPPLEMENT; Breakfast, Lunch; Standard High Calorie/High Protein Oral Supplement   DVT Prophylaxis [] Lovenox, []  Heparin, [] SCDs, [] Ambulation,  [] Eliquis, [] Xarelto  [] Coumadin   Code Status Full Code   Disposition From:   Expected Disposition:   Estimated Date of Discharge:   Patient requires continued admission due to    Surrogate Decision Maker/ POA   
    HB < 7    Explained patient that he need blood transfusion    Risk vs benefits explained    Patient agreed for PRBC transfusion    Spoke to his son Sanjay , and he gave consent for blood transfusion  
    Patient seen and examined    Cont IVF D 5 water    Check Na every 4 hrly      
  Nephrology Progress Note   [facility]      Reason for consultation: Hypernatremia / EVELINA -- last known Cr 1.0 mg/dL on 2024. Plans to follow with Dr. Thomas outpatient     Subjective:    The patient has been seen and examined. Labs and chart reviewed. Resting in bed. Cr trending to 2.3 mg/dL this AM. Na+ remains WNL on D5W. Continues to endorses poor oral intake.     There were not complications overnight.    Patient review of systems: Denies SOB.       Allergies:  No Known Allergies     Scheduled Meds:   lidocaine 1 % injection  5 mL IntraDERmal Once    sodium chloride flush  5-40 mL IntraVENous 2 times per day    ertapenem (INVanz) IV  500 mg IntraVENous Q24H    lidocaine 1 % injection  5 mL IntraDERmal Once    finasteride  5 mg Oral Daily    hydrALAZINE  10 mg Oral BID    metoprolol tartrate  25 mg Oral BID    NIFEdipine  30 mg Oral Daily    tamsulosin  0.4 mg Oral Daily    senna  1 tablet Oral QHS    enoxaparin  30 mg SubCUTAneous Daily        sodium chloride      sodium chloride      sodium chloride      sodium chloride      sodium chloride 5 mL/hr at 24 0917    dextrose 75 mL/hr at 24 1745       PRN Meds:sodium chloride, sodium chloride flush, sodium chloride, sodium chloride, morphine, sodium chloride, sodium chloride, ondansetron **OR** ondansetron, polyethylene glycol, acetaminophen **OR** acetaminophen    Physical Exam:    TEMPERATURE:  Current - Temp: 98.3 °F (36.8 °C); Max - Temp  Av.6 °F (37 °C)  Min: 98.1 °F (36.7 °C)  Max: 99.3 °F (37.4 °C)  RESPIRATIONS RANGE: Resp  Av  Min: 16  Max: 22  PULSE RANGE: Pulse  Av.9  Min: 81  Max: 99  BLOOD PRESSURE RANGE:  Systolic (24hrs), Av , Min:103 , Max:126   ; Diastolic (24hrs), Av, Min:47, Max:67    24HR INTAKE/OUTPUT:    Intake/Output Summary (Last 24 hours) at 2024 1117  Last data filed at 2024 0737  Gross per 24 hour   Intake 240 ml   Output 1200 ml   Net -960 ml       Patient Vitals for the past 96 hrs 
  Nephrology Progress Note   [facility] [place]      Reason for consultation: Hypernatremia / EVELINA -- last known Cr 1.0 mg/dL on 2024. Plans for follow with Dr. Thomas outpatient     Subjective:    The patient has been seen and examined. Labs and chart reviewed. Resting in bed. Na+ trending down to 147 mmol/L this AM. D5W @ 125 mL infusing. Continues to endorse poor oral intake. Cr trending down to 2.9 mg/dL. ID consulted yesteday for Klebsiella ESBL bacteremia and complicated UTI.     There were not complications overnight.    Patient review of systems: Denies SOB.       Allergies:  No Known Allergies     Scheduled Meds:   potassium chloride  20 mEq Oral Once    finasteride  5 mg Oral Daily    hydrALAZINE  10 mg Oral BID    metoprolol tartrate  25 mg Oral BID    NIFEdipine  30 mg Oral Daily    tamsulosin  0.4 mg Oral Daily    senna  1 tablet Oral QHS    sodium chloride flush  5-40 mL IntraVENous 2 times per day    enoxaparin  30 mg SubCUTAneous Daily    meropenem  500 mg IntraVENous Q12H        sodium chloride      sodium chloride 20 mL/hr at 24 1042    dextrose 125 mL/hr at 24 0858       PRN Meds:sodium chloride, sodium chloride flush, sodium chloride, ondansetron **OR** ondansetron, polyethylene glycol, acetaminophen **OR** acetaminophen    Physical Exam:    TEMPERATURE:  Current - Temp: 97.7 °F (36.5 °C); Max - Temp  Av.5 °F (36.9 °C)  Min: 97.7 °F (36.5 °C)  Max: 98.9 °F (37.2 °C)  RESPIRATIONS RANGE: Resp  Av.7  Min: 17  Max: 25  PULSE RANGE: Pulse  Av.8  Min: 84  Max: 114  BLOOD PRESSURE RANGE:  Systolic (24hrs), Av , Min:108 , Max:129   ; Diastolic (24hrs), Av, Min:43, Max:69    24HR INTAKE/OUTPUT:    Intake/Output Summary (Last 24 hours) at 2024 1010  Last data filed at 2024 0544  Gross per 24 hour   Intake 2629.31 ml   Output 1550 ml   Net 1079.31 ml         Patient Vitals for the past 96 hrs (Last 3 readings):   Weight   24 0455 72 kg (158 lb 11.7 oz) 
  Nephrology Progress Note   [facility]      Reason for consultation: Hypernatremia / EVELINA -- last known Cr 1.0 mg/dL on 2024. Plans to follow with Dr. Thomas outpatient     Subjective:    The patient has been seen and examined. Labs and chart reviewed. Resting in bed. Cr trending down to 2.2 mg/dL this AM. Na+ remains WNL on D5W. NPO for EGD/colonoscopy today.     There were not complications overnight.    Patient review of systems: Denies SOB.       Allergies:  No Known Allergies     Scheduled Meds:   lidocaine 1 % injection  5 mL IntraDERmal Once    sodium chloride flush  5-40 mL IntraVENous 2 times per day    lidocaine 1 % injection  5 mL IntraDERmal Once    sodium chloride flush  5-40 mL IntraVENous 2 times per day    ertapenem (INVanz) IV  500 mg IntraVENous Q24H    lidocaine 1 % injection  5 mL IntraDERmal Once    finasteride  5 mg Oral Daily    hydrALAZINE  10 mg Oral BID    metoprolol tartrate  25 mg Oral BID    NIFEdipine  30 mg Oral Daily    tamsulosin  0.4 mg Oral Daily    senna  1 tablet Oral QHS    enoxaparin  30 mg SubCUTAneous Daily        sodium chloride      sodium chloride      sodium chloride      sodium chloride      sodium chloride      sodium chloride 5 mL/hr at 24 0917    dextrose 75 mL/hr at 24 0636       PRN Meds:sodium chloride flush, sodium chloride, sodium chloride, sodium chloride flush, sodium chloride, sodium chloride, morphine, sodium chloride, sodium chloride, ondansetron **OR** ondansetron, polyethylene glycol, acetaminophen **OR** acetaminophen    Physical Exam:    TEMPERATURE:  Current - Temp: 97.4 °F (36.3 °C); Max - Temp  Av.6 °F (37 °C)  Min: 97.4 °F (36.3 °C)  Max: 99.2 °F (37.3 °C)  RESPIRATIONS RANGE: Resp  Av.2  Min: 16  Max: 22  PULSE RANGE: Pulse  Av.9  Min: 88  Max: 108  BLOOD PRESSURE RANGE:  Systolic (24hrs), Av , Min:114 , Max:151   ; Diastolic (24hrs), Av, Min:56, Max:72    24HR INTAKE/OUTPUT:    Intake/Output Summary (Last 24 
  Physician Progress Note      PATIENT:               REINA COOK  [facility] #:                  [id number]  :                       1940  ADMIT DATE:       2024 10:38 PM  DISCH DATE:  RESPONDING  PROVIDER #:        Martha Chu MD          QUERY TEXT:    Pt admitted with UTI.  Pt noted to have chronic indwelling urinary catheter,   previous procedure/urinary catheter or other urinary drainage device). 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 05/15 WBC 19.6,   procalcitonin 9.10, lactic acid 2.1, 's, urine culture +klebsiella   pneumoniae. 05/15 H&P- Patient has a chronic catheter in place symptoms of   fever and tachycardia. 05/15 Urology consult- UA reveals possible infection vs   contamination from chronic corado, turbid, +pyuria >3000 WBC/hpf, 4+ bacteria,   1+ CaOx.  Treatment: Corado exchanged, labs, vanc and cefepime  Options provided:  -- UTI due to chronic indwelling urinary catheter  -- UTI not due to indwelling urinary catheter  -- Other - I will add my own diagnosis  -- Disagree - Not applicable / Not valid  -- Disagree - Clinically unable to determine / Unknown  -- Refer to Clinical Documentation Reviewer    PROVIDER RESPONSE TEXT:    UTI is due to the chronic indwelling urinary catheter.    Query created by: Lara Carmichael on 2024 12:11 PM      Electronically signed by:  Martha Chu MD 2024 9:30 AM          
1 unit PRBC competed, no ASE, pt tolerated well. No distress or discomfort noted.  New order for H&H post transfusion per protocol ordered. Cont POC     Electronically signed by Radha vAila RN on 5/16/2024 at 3:24 PM   
4 Eyes Skin Assessment     NAME:  Reji Kaufman  YOB: 1940  MEDICAL RECORD NUMBER:  [id number]    The patient is being assessed for  Admission    I agree that at least one RN has performed a thorough Head to Toe Skin Assessment on the patient. ALL assessment sites listed below have been assessed.      Areas assessed by both nurses:    Head, Face, Ears, Shoulders, Back, Chest, Arms, Elbows, Hands, Sacrum. Buttock, Coccyx, Ischium, Legs. Feet and Heels, and Under Medical Devices         Does the Patient have a Wound? Yes wound(s) were present on assessment. LDA wound assessment was Initiated and completed by RN       Sivakumar Prevention initiated by RN: Yes  Wound Care Orders initiated by RN: Yes    Pressure Injury (Stage 3,4, Unstageable, DTI, NWPT, and Complex wounds) if present, place Wound referral order by RN under : No    New Ostomies, if present place, Ostomy referral order under : No     Nurse 1 eSignature: Electronically signed by Thaddeus Navas RN on 5/15/24 at 6:52 AM EDT      **SHARE this note so that the co-signing nurse can place an eSignature**    Nurse 2 eSignature: Electronically signed by Radha Romero RN on 5/15/24 at 6:53 AM EDT    
Arrived to place PICC after chart review. Pre-procedure and timeout done with STEVEN Lilly. Discussed limitations of placement and allergies. Consent confirmed. Procedure explained to pt, including risks and benefits. All questions answered. Pt verbalized understanding.      Vessels easily collapsible upon assessment. No difficulty accessing R brachial vein. Blood free flowing and non-pulsatile. Guidewire, introducer, and catheter all easily inserted. PICC placement verified using 3CG technology as evidenced by peaked P-waves with no initial deflection. Line has brisk blood return and flushes easily.     OK to use PICC. Please use new IV tubing when connecting to the newly placed central line.      Patient tolerated sterile procedure well. Bed left in low position with belongings and call light within reach. Educated on line care. Handoff to bedside RN.      Please call with any questions or concerns. The  will direct you to the PICC RN that is on call.      [phone]              
Arrived to place PICC line with bedside RN Ben. Pre-procedure and timeout done with RN, discussed limitations of placement and allergies. Consent confirmed. Vital signs stable. Labs, allergies, medications, and code status reviewed. No contraindications noted.    Procedure explained to pt, including the risk and benefits of the procedure. All questions answered. Pt verbalizes understanding of the procedure and states no more questions.     Pt's basilic, brachial, cephalic are all easily collapsible with no indication for a clot. Vein selected is large enough for catheter. Pt tolerated sterile procedure well, with no difficulty accessing basilic vein, when accessed - blood was free flowing and non-pulsatile. Guidewire, introducer, and catheter went in smoothly.     PICC line being verified with XRAY, please do not use PICC until the impression comes back with the tip within the SVC or Cavo atrial junction. Once placement is confirmed receive orders from MD to use PICC.     If PICC is not within SVC please call [facility] and  will notify the PICC RN that is on call.    Nurses, when PICC is verified:  Please replace all existing IV tubing with new IV tubing prior to using the PICC for current IV infusions.  Please remove any PIVs from PICC arm.  All of the above may be sources of infection or an increase chance of a clot.      Post procedure - reorganized pt table, placed pt in lowest position, with call light and educated on line care. Instructed pt/RN not to use arm for at least 30min to avoid bleeding. Reported off to bedside RN.        [phone]      
Blood consent received via phone X2 RN with son Sanjay Kaufman.   
Blood transfusion administration began, and pt tolerating well. No ASE noted.pt currently in bed resting, call light in reach. No distress or discomfort noted.   
Blood transfusion completed and pt tolerated well. No ASE noted. Pt currently in bed resting, call light in reach. No distress or discomfort noted.   
CMU called writer and stated pt went into 2 AV block I. Notified Dr. Chu. New order for STAT EKG. Pt currently resting with call light in reach. No distress or discomfort noted.   
Called X2 times to son Sanjay gomez who is decision maker for pt for consent for EGD and colonoscopy. No answer. Left voicemail to call hospital back when he has time. Grand daughter at bedside and stated that uncle might be at work still and unavailable.   
Called lab at this time d/t the 0200 Sodium check not being resulted yet even tho I saw lab at the pts bedside. They stated the  had a failed attempt at 0215 and was supposed to send another  up to try but I have not seen anyone come by. Lab said they will send someone up to try again at this time.   Thaddeus Navas RN    
Clinical Pharmacy Note  Vancomycin Consult    Reji Kaufman is a 83 y.o. male ordered vancomycin for CAP and UTI; consult received from Dr. Jacobson to manage therapy. Also receiving cefepime.    Allergies:  Patient has no known allergies.     Temp max:  Temp (24hrs), Av.7 °F (38.2 °C), Min:100.5 °F (38.1 °C), Max:100.9 °F (38.3 °C)      Recent Labs     24  2339   WBC 18.5*       Recent Labs     05/15/24  0039   BUN 77*   CREATININE 4.0*         Intake/Output Summary (Last 24 hours) at 5/15/2024 0557  Last data filed at 5/15/2024 0320  Gross per 24 hour   Intake 600.2 ml   Output --   Net 600.2 ml       Culture Results:  Pending - MRSA nasal ordered - HX of MDRO in urine 04/15/24    Ht Readings from Last 1 Encounters:   24 1.829 m (6')        Wt Readings from Last 1 Encounters:   05/15/24 68.1 kg (150 lb 2.1 oz)         Estimated Creatinine Clearance: 13 mL/min (A) (based on SCr of 4 mg/dL (H)).    Assessment/Plan:  Loading dose 1500mg 05/15/24 0320    Vanc random scheduled for 05/15/24 1200 - will dose vanc based off of levels due to low CrCl    Thank you for the consult.     Katie Fonseca, ZairaD    
Comprehensive Nutrition Assessment    Type and Reason for Visit:  Reassess    Nutrition Recommendations/Plan:   Advance to regular diet when appropriate per provider     Malnutrition Assessment:  Malnutrition Status:  At risk for malnutrition (Comment) (05/17/24 1156)    Context:  Acute Illness       Nutrition Assessment:    Follow up. Pt currently NPO for EGD. Pt previously on regular diet w/ good intake. Finishing meals and taking ONS. Monitor for restart of PO diet/need for ONS.    Nutrition Related Findings:    nutrition related labs reviewed. Wound Type: Stage II (sacrum)       Current Nutrition Intake & Therapies:    Average Meal Intake: NPO  Average Supplements Intake: NPO  Diet NPO    Anthropometric Measures:  Height: 182.9 cm (6')  Ideal Body Weight (IBW): 178 lbs (81 kg)       Current Body Weight: 68.1 kg (150 lb 2.1 oz),   IBW. Weight Source: Bed Scale  Current BMI (kg/m2): 20.4                          BMI Categories: Underweight (BMI less than 22) age over 65    Estimated Daily Nutrient Needs:  Energy Requirements Based On: Kcal/kg  Weight Used for Energy Requirements: Current  Energy (kcal/day): 5297-9318 (25-30 kcal/68.1 kg)  Weight Used for Protein Requirements: Current  Protein (g/day): 82-89 (1.2-1.3 g/68.1 kg)  Method Used for Fluid Requirements: 1 ml/kcal  Fluid (ml/day):  or per provider     Nutrition Diagnosis:   Inadequate oral intake related to increase demand for energy/nutrients as evidenced by wounds    Nutrition Interventions:   Food and/or Nutrient Delivery: Start Oral Diet  Nutrition Education/Counseling: Education not indicated  Coordination of Nutrition Care: Continue to monitor while inpatient       Goals:     Goals: Meet at least 75% of estimated needs, by next RD assessment, Initiate PO diet       Nutrition Monitoring and Evaluation:   Behavioral-Environmental Outcomes: None Identified  Food/Nutrient Intake Outcomes: Diet Advancement/Tolerance, Food and Nutrient Intake  Physical 
Comprehensive Nutrition Assessment    Type and Reason for Visit:  Reassess    Nutrition Recommendations/Plan:   Continue regular diet, ensure plus BID, magic cup bid     Malnutrition Assessment:  Malnutrition Status:  At risk for malnutrition (Comment) (05/17/24 1156)    Context:  Acute Illness       Nutrition Assessment:    Follow up. Pt sleeping during RD visit. Attempted to wake pt but he was very groggy. Breakfast hadn't been touched. Pt eating <50% of meals per chart. Encouraged pt to eat when he feels more awake. Pt not meeting nutrition needs at this time. Unable to provide info on supplement intake. Continue to send while meal intake poor.    Nutrition Related Findings:    118 glucose Wound Type: Stage II (sacrum)       Current Nutrition Intake & Therapies:    Average Meal Intake: 26-50%  Average Supplements Intake: Unable to assess  ADULT DIET; Regular  ADULT ORAL NUTRITION SUPPLEMENT; Breakfast, Lunch, Dinner; Standard High Calorie/High Protein Oral Supplement  ADULT ORAL NUTRITION SUPPLEMENT; Lunch, Dinner; Frozen Oral Supplement    Anthropometric Measures:  Height: 182.9 cm (6')  Ideal Body Weight (IBW): 178 lbs (81 kg)       Current Body Weight: 68.1 kg (150 lb 2.1 oz),   IBW. Weight Source: Bed Scale  Current BMI (kg/m2): 20.4                          BMI Categories: Underweight (BMI less than 22) age over 65    Estimated Daily Nutrient Needs:  Energy Requirements Based On: Kcal/kg  Weight Used for Energy Requirements: Current  Energy (kcal/day): 3880-1258 (25-30 kcal/68.1 kg)  Weight Used for Protein Requirements: Current  Protein (g/day): 82-89 (1.2-1.3 g/68.1 kg)  Method Used for Fluid Requirements: 1 ml/kcal  Fluid (ml/day):      Nutrition Diagnosis:   Inadequate oral intake related to increase demand for energy/nutrients as evidenced by wounds    Nutrition Interventions:   Food and/or Nutrient Delivery: Continue Current Diet, Continue Oral Nutrition Supplement  Nutrition Education/Counseling: 
Department of Internal Medicine  Nephrology Progress Note        REASON FOR CONSULTATION:  Acute kidney injury with hyponatremia.     HISTORY OF PRESENTING ILLNESS:  83-year-old  male with past medical history significant for hypertension, vascular dementia, chronic alcohol abuse, coronary artery disease, congestive heart failure, metastatic prostate cancer, on Lupron shots, follows up with  Urology, recently treated for acute kidney injury secondary to urinary retention and urinary tract infection, sent back to the [facility] ER because of worsening mentation.  Routine labs showed acute kidney injury with hypernatremia.  The patient was also found to have possible urinary tract infection and pneumonia as a possible source of his fever.  Urology has been consulted for concerning obstructive uropathy and metastatic prostate cancer.  Overnight, the patient being started on broad-spectrum antibiotic and hypotonic IV fluids.  Urology exchanged the Rose catheter that seems to be draining well.  At the time of consultation, the patient is confused, disoriented.      Events noted . Chart reviewed   Labs reviewed.       REVIEW OF SYSTEMS:  No SOB/MCINTYRE   MS unchanged .  No family present .       Physical Exam:    VITALS:  /70   Pulse (!) 106   Temp 98.7 °F (37.1 °C) (Oral)   Resp 18   Ht 1.829 m (6')   Wt 76.4 kg (168 lb 6.9 oz)   SpO2 96%   BMI 22.84 kg/m²   24HR INTAKE/OUTPUT:    Intake/Output Summary (Last 24 hours) at 5/18/2024 1436  Last data filed at 5/18/2024 1229  Gross per 24 hour   Intake 360 ml   Output 1350 ml   Net -990 ml         Constitutional: looks comfortable   Respiratory:  CTA  Gastrointestinal:  No  tenderness.  Normal Bowel Sounds  Cardiovascular:  S1, S2 RRR   Edema:  Lower Extremity has no edema    DATA:    CBC:  Lab Results   Component Value Date/Time    WBC 14.0 05/18/2024 05:21 AM    RBC 2.91 05/18/2024 05:21 AM    HGB 7.3 05/18/2024 05:21 AM    HCT 23.2 05/18/2024 05:21 AM 
Department of Internal Medicine  Nephrology Progress Note        REASON FOR CONSULTATION:  Acute kidney injury with hyponatremia.     HISTORY OF PRESENTING ILLNESS:  83-year-old  male with past medical history significant for hypertension, vascular dementia, chronic alcohol abuse, coronary artery disease, congestive heart failure, metastatic prostate cancer, on Lupron shots, follows up with  Urology, recently treated for acute kidney injury secondary to urinary retention and urinary tract infection, sent back to the [facility] ER because of worsening mentation.  Routine labs showed acute kidney injury with hypernatremia.  The patient was also found to have possible urinary tract infection and pneumonia as a possible source of his fever.  Urology has been consulted for concerning obstructive uropathy and metastatic prostate cancer.  Overnight, the patient being started on broad-spectrum antibiotic and hypotonic IV fluids.  Urology exchanged the Rose catheter that seems to be draining well.  At the time of consultation, the patient is confused, disoriented.      Events noted . Chart reviewed   Labs reviewed.   More awake today .     REVIEW OF SYSTEMS:  No SOB/MCINTYRE   Co hip pain   No family present .       Physical Exam:    VITALS:  /65   Pulse (!) 107   Temp 98 °F (36.7 °C) (Oral)   Resp 23   Ht 1.829 m (6')   Wt 72 kg (158 lb 11.7 oz)   SpO2 97%   BMI 21.53 kg/m²   24HR INTAKE/OUTPUT:    Intake/Output Summary (Last 24 hours) at 5/17/2024 1256  Last data filed at 5/17/2024 0544  Gross per 24 hour   Intake 2269.31 ml   Output 1550 ml   Net 719.31 ml         Constitutional: looks comfortable   Respiratory:  CTA  Gastrointestinal:  No  tenderness.  Normal Bowel Sounds  Cardiovascular:  S1, S2 RRR   Edema:  Lower Extremity has no edema    DATA:    CBC:  Lab Results   Component Value Date/Time    WBC 13.0 05/17/2024 05:27 AM    RBC 2.94 05/17/2024 05:27 AM    HGB 7.4 05/17/2024 05:27 AM    HCT 23.1 
Department of Internal Medicine  Nephrology Progress Note        REASON FOR CONSULTATION:  Acute kidney injury with hyponatremia.     HISTORY OF PRESENTING ILLNESS:  83-year-old  male with past medical history significant for hypertension, vascular dementia, chronic alcohol abuse, coronary artery disease, congestive heart failure, metastatic prostate cancer, on Lupron shots, follows up with  Urology, recently treated for acute kidney injury secondary to urinary retention and urinary tract infection, sent back to the [facility] ER because of worsening mentation.  Routine labs showed acute kidney injury with hypernatremia.  The patient was also found to have possible urinary tract infection and pneumonia as a possible source of his fever.  Urology has been consulted for concerning obstructive uropathy and metastatic prostate cancer.  Overnight, the patient being started on broad-spectrum antibiotic and hypotonic IV fluids.  Urology exchanged the Rose catheter that seems to be draining well.  At the time of consultation, the patient is confused, disoriented.      Events noted . Chart reviewed   Labs reviewed.     No marked change clinically .  REVIEW OF SYSTEMS:  No SOB/MCINTYRE   MS unchanged .  No family present .       Physical Exam:    VITALS:  BP (!) 114/54   Pulse 94   Temp 98.2 °F (36.8 °C) (Oral)   Resp 18   Ht 1.829 m (6')   Wt 76.4 kg (168 lb 6.9 oz)   SpO2 99%   BMI 22.84 kg/m²   24HR INTAKE/OUTPUT:    Intake/Output Summary (Last 24 hours) at 5/19/2024 1610  Last data filed at 5/19/2024 1300  Gross per 24 hour   Intake 497 ml   Output 2500 ml   Net -2003 ml         Constitutional: looks comfortable   Respiratory:  CTA  Gastrointestinal:  No  tenderness.  Normal Bowel Sounds  Cardiovascular:  S1, S2 RRR   Edema:  Lower Extremity has no edema    DATA:    CBC:  Lab Results   Component Value Date/Time    WBC 12.3 05/19/2024 06:14 AM    RBC 2.92 05/19/2024 06:14 AM    HGB 7.4 05/19/2024 06:14 AM    
Department of Internal Medicine  Nephrology Progress Note        REASON FOR CONSULTATION:  Acute kidney injury with hyponatremia.     HISTORY OF PRESENTING ILLNESS:  83-year-old  male with past medical history significant for hypertension, vascular dementia, chronic alcohol abuse, coronary artery disease, congestive heart failure, metastatic prostate cancer, on Lupron shots, follows up with  Urology, recently treated for acute kidney injury secondary to urinary retention and urinary tract infection, sent back to the [facility] ER because of worsening mentation.  Routine labs showed acute kidney injury with hypernatremia.  The patient was also found to have possible urinary tract infection and pneumonia as a possible source of his fever.  Urology has been consulted for concerning obstructive uropathy and metastatic prostate cancer.  Overnight, the patient being started on broad-spectrum antibiotic and hypotonic IV fluids.  Urology exchanged the Rose catheter that seems to be draining well.  At the time of consultation, the patient is confused, disoriented.      Events noted . Chart reviewed   Labs reviewed.   More awake today .     REVIEW OF SYSTEMS:  No SOB/MCINTYRE   No family present .       Physical Exam:    VITALS:  BP (!) 108/53   Pulse (!) 105   Temp 98.3 °F (36.8 °C) (Oral)   Resp 18   Ht 1.829 m (6')   Wt 70.8 kg (156 lb 1.4 oz)   SpO2 (!) 71%   BMI 21.17 kg/m²   24HR INTAKE/OUTPUT:    Intake/Output Summary (Last 24 hours) at 5/16/2024 1344  Last data filed at 5/16/2024 1109  Gross per 24 hour   Intake 420 ml   Output 950 ml   Net -530 ml         Constitutional: looks comfortable   Respiratory:  CTA  Gastrointestinal:  No  tenderness.  Normal Bowel Sounds  Cardiovascular:  S1, S2 RRR   Edema:  Lower Extremity has no edema    DATA:    CBC:  Lab Results   Component Value Date/Time    WBC 19.5 05/16/2024 09:42 AM    RBC 2.75 05/16/2024 09:42 AM    HGB 6.7 05/16/2024 09:42 AM    HCT 21.6 05/16/2024 
Department of Internal Medicine  Nephrology Progress Note        REASON FOR CONSULTATION:  Acute kidney injury with hyponatremia.     HISTORY OF PRESENTING ILLNESS:  83-year-old  male with past medical history significant for hypertension, vascular dementia, chronic alcohol abuse, coronary artery disease, congestive heart failure, metastatic prostate cancer, on Lupron shots, follows up with  Urology, recently treated for acute kidney injury secondary to urinary retention and urinary tract infection, sent back to the [facility] ER because of worsening mentation.  Routine labs showed acute kidney injury with hypernatremia.  The patient was also found to have possible urinary tract infection and pneumonia as a possible source of his fever.  Urology has been consulted for concerning obstructive uropathy and metastatic prostate cancer.  Overnight, the patient being started on broad-spectrum antibiotic and hypotonic IV fluids.  Urology exchanged the Rose catheter that seems to be draining well.  At the time of consultation, the patient is confused, disoriented.      Events noted . Chart reviewed   Labs reviewed.     REVIEW OF SYSTEMS:  Sleeping / resting     Physical Exam:    VITALS:  BP (!) 130/52   Pulse 98   Temp 97.7 °F (36.5 °C) (Oral)   Resp 16   Ht 1.829 m (6')   Wt 68.1 kg (150 lb 2.1 oz)   SpO2 98%   BMI 20.36 kg/m²   24HR INTAKE/OUTPUT:    Intake/Output Summary (Last 24 hours) at 5/15/2024 1255  Last data filed at 5/15/2024 0914  Gross per 24 hour   Intake 720.2 ml   Output --   Net 720.2 ml       Constitutional: looks comfortable   Respiratory:  CTA  Gastrointestinal:  No  tenderness.  Normal Bowel Sounds  Cardiovascular:  S1, S2 RRR   Edema:  Lower Extremity has no edema    DATA:    CBC:  Lab Results   Component Value Date/Time    WBC 19.6 05/15/2024 06:49 AM    RBC 3.31 05/15/2024 06:49 AM    HGB 8.0 05/15/2024 06:49 AM    HCT 27.1 05/15/2024 06:49 AM    MCV 81.9 05/15/2024 06:49 AM    [facility] 
Endo notified that bowel movements are not fully clear at this point. Transferred to [facility] by surgery transport in stable condition.  Socrates Fletcher RN  5/21/2024    
Executed blood consent in pt chart.     Electronically signed by Radha Avila RN on 5/16/2024 at 11:09 AM   
ID Follow-up NOTE    CC:   Complicated UTI, Klebsiella ESBL bacteremia  Antibiotics: Meropenem    Admit Date: 5/14/2024  Hospital Day: 8    Subjective:     S/p EGD and colonoscopy today    Patient feels good -   No  sx, no complaint      Objective:     Patient Vitals for the past 8 hrs:   BP Temp Temp src Pulse Resp SpO2   05/21/24 2057 137/60 -- -- -- -- --   05/21/24 1936 (!) 144/81 98.7 °F (37.1 °C) Axillary (!) 109 21 97 %   05/21/24 1900 135/65 -- -- (!) 111 19 --   05/21/24 1830 (!) 125/106 -- -- (!) 109 20 --   05/21/24 1800 137/81 -- -- (!) 108 20 --   05/21/24 1745 120/64 -- -- 99 19 --   05/21/24 1730 125/64 -- -- (!) 108 18 --   05/21/24 1715 (!) 144/91 -- -- (!) 105 20 100 %   05/21/24 1703 139/73 98.1 °F (36.7 °C) Oral (!) 112 23 100 %   05/21/24 1639 134/61 98.9 °F (37.2 °C) Temporal 98 14 97 %   05/21/24 1631 133/74 -- -- (!) 102 20 96 %   05/21/24 1625 115/75 -- -- 99 22 97 %   05/21/24 1620 (!) 116/53 -- -- (!) 102 23 96 %   05/21/24 1618 -- -- -- 100 17 98 %   05/21/24 1615 102/60 -- -- 99 21 98 %   05/21/24 1613 -- -- -- -- 23 99 %   05/21/24 1611 120/83 -- -- 95 19 100 %   05/21/24 1609 -- -- -- 99 20 99 %   05/21/24 1606 -- -- -- 95 17 96 %   05/21/24 1605 102/63 98.9 °F (37.2 °C) Temporal 95 21 (!) 85 %   05/21/24 1604 -- -- -- 99 -- --   05/21/24 1423 139/72 97.1 °F (36.2 °C) Temporal (!) 107 18 98 %       I/O last 3 completed shifts:  In: 4983.5 [P.O.:120; I.V.:4470.2; Blood:297; IV Piggyback:96.4]  Out: 1950 [Urine:1950]  I/O this shift:  In: 20 [I.V.:20]  Out: -     EXAM:  GENERAL: No apparent distress.  RA  HEENT: Membranes moist, no oral lesion  NECK:  Supple, no lymphadenopathy  LUNGS: Clear b/l, no rales, no dullness  CARDIAC: RRR, no murmur appreciated  ABD:  + BS, soft / NT - no SP tenderness, no CVAT  EXT:  No rash, no edema, no lesions  NEURO: No focal neurologic findings  PSYCH: Orientation, sensorium, mood normal  LINES:  Peripheral iv       Data Review:  Lab Results   Component 
Informed consent for EGD and colonoscopy signed by the pt's son,Sanjay Kaufman  and witnessed by this RN. It's kept in the pt's chart.    Pt has been having five times of bowel movement which is loose and he's been incontinent.Stool occult Blood resulted negative.  
Jaye Galicia NP messaged at this time with the pts positive blood culture results- orders placed for meropenem q12.  Thaddeus Navas RN      
Lab call new h and h 6.5 and 20.7. MD notified via prefect serve. Awaiting further orders at this time   
Lab is still having trouble collecting labs from this pt. A third  is at the bedside trying at this time. No acute events over night, VSS. Pt only slept for about 2 hours over night.   Thaddeus Navas RN    
Late entry due to patient care    This RN attempted to get patient out of bed. Patient agreeable and received prn pain medication prior to ambulation due to increased pain in R hip. This RN attempted twice with stedy device, patient unable to fully stand to get pads under him. Additional staff called for a x2 assist. Patient them able to get up into stedy. While ambulating with stedy to chair, patient reports dizziness and then begins to be unable to follow commands and lose body tone in stedy. Safely then transported back to the bed with heavy assist as patient unable to hold self up. Vitals checked once safely in bed, WDL, patient alert, has no cx of dizziness, full muscle tone, no complications. Patient then turned to put cream on buttocks, turned onto side. Patient will be bedrest/maxi move if needed but for safety reasons, patient to remain in bed until further assessed by PT/OT. Plan of care ongoing    Electronically signed by Vijaya Fournier RN on 5/18/2024 at 12:28 PM    
New orders for picc line, approval from nephro. Phone consent obtained via son Sanjay witnessed by additional RN. Dynamic access called and awaiting picc RN to place line. Sanjay and granddaughter Asif updated on patient status.    Electronically signed by Vijaya Fournier RN on 5/18/2024 at 4:15 PM    
Occupational Therapy  Facility/Department: [facility] 5 PROGRESSIVE CARE  Occupational Therapy Initial Assessment    Name: Reji Kaufman  : 1940  MRN: [id number]  Date of Service: 2024    Discharge Recommendations:  Long Term Care with OT, Long Term Care without OT     Reji Kaufman scored a 8/24 on the -[facility] ADL Inpatient form.       Patient Diagnosis(es): The primary encounter diagnosis was Altered mental status, unspecified altered mental status type. Diagnoses of Hypernatremia, EVELINA (acute kidney injury) (HCC), Urinary tract infection associated with indwelling urethral catheter, initial encounter ([facility]), Septicemia ([facility]), and Pneumonia due to infectious organism, unspecified laterality, unspecified part of lung were also pertinent to this visit.  Past Medical History:  has a past medical history of Alcohol abuse, AV block, 1st degree, and Hypertension.  Past Surgical History:  has no past surgical history on file.    Treatment Diagnosis: Decreased: ADLs, functional transfers/mobility      Assessment   Performance deficits / Impairments: Decreased functional mobility ;Decreased ADL status;Decreased endurance;Decreased cognition;Decreased safe awareness;Decreased strength;Decreased ROM  Assessment: Pt is a 82 yo M w/ PMHx vascular dementia who presented from [facility] w/ AMS. Admitted with UTI. At baseline, pt is a LTC resident at [facility] where his current functional status is unclear however he was ambulatory w/ RW during his last admission (). Today, pt is lethargic and requiring max-total A for repositioning in bed. Limited command following and reports he is overall \"not doing well\". Anticipate max-total A for full ADL. Will continue to follow while hospitalized. Anticipate pt will be safe to return to prior facility w/ or without ongoing OT at discretion of the staff if he is deemed below baseline.  Treatment Diagnosis: Decreased: ADLs, functional transfers/mobility  Prognosis: Good  Decision 
Occupational Therapy  Facility/Department: [facility] 5W PROGRESSIVE CARE  Occupational Therapy Daily Assessment    Name: Reji Kaufman  : 1940  MRN: [id number]  Date of Service: 2024    Discharge Recommendations:  Long Term Care with OT, Long Term Care without OT        Reji Kaufman scored a 8/24 on the AM-[facility] ADL Inpatient form.  At this time, no further OT is recommended upon discharge.  Recommend patient returns to prior setting with prior services.       Patient Diagnosis(es): The primary encounter diagnosis was Altered mental status, unspecified altered mental status type. Diagnoses of Hypernatremia, EVELINA (acute kidney injury) (HCC), Urinary tract infection associated with indwelling urethral catheter, initial encounter (HCC), Septicemia (HCC), and Pneumonia due to infectious organism, unspecified laterality, unspecified part of lung were also pertinent to this visit.  Past Medical History:  has a past medical history of Alcohol abuse, AV block, 1st degree, and Hypertension.  Past Surgical History:  has no past surgical history on file.    Treatment Diagnosis: Decreased: ADLs, functional transfers/mobility      Assessment   Performance deficits / Impairments: Decreased functional mobility ;Decreased ADL status;Decreased endurance;Decreased cognition;Decreased safe awareness;Decreased strength;Decreased ROM  Assessment: Pt is a 84 yo M w/ PMHx vascular dementia who presented from [facility] w/ AMS. Admitted with UTI. At baseline, pt is a LTC resident at [facility] where his current functional status is unclear however he was ambulatory w/ RW during his last admission (). Today, pt is pleasantly confused. Pt required max A x 2 for bed mobility and CGA-mod A for sitting balance EOB. Pt able to scoot hips towards EOB with mod A x 2. Pt requires encouragement to take sips of colonoscopy prep throughout session. Anticipate max-total A for full ADL. Will continue to follow while hospitalized. Anticipate pt 
Occupational Therapy Attempt Note  Reji Kaufman  [id number]  [id number]     OT orders received and chart reviewed; attempted to see for OT eval however pt awaiting PRBCs due to HGB of 6.7; will follow and attempt again when HGB >7.    Electronically signed by DARWIN Kelly on 5/16/2024 at 1:54 PM   
Per ID, patient recommended to have IV abx at discharge. Perfect serve to MD Chu and MD Rodriguez with nephrology for clearance. Awaiting orders/response.     Also, MD from ortho rounding and spoke with patient and RN, review of scans and no evident fracture on xray or CT scan of R hip. Misread on radiology. 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 MD notified.    Electronically signed by Vijaya Fournier RN on 5/18/2024 at 2:04 PM    
Physical Therapy      Reji Dayanfrench  [id number]  [id number]    PT orders received and chart reviewed; attempted to see for PT eval however pt awaiting PRBCs due to HGB of 6.7; will follow and attempt again when HGB >7.  Electronically signed by PEREZ PONCE, PT on 5/16/2024 at 12:41 PM       
Physical Therapy      Reji Kaufman  [id number]  [id number]    Nursing notified therapy that x-ray of pt's R hip (+) Subcapital fracture of the right femoral neck. Pt now has an ortho consult in place; will follow for recommendations but will follow NWB RLE and defer further therapy until ortho consult completed.  Electronically signed by PEREZ PONCE PT on 5/17/2024 at 3:29 PM       
Physical Therapy  Facility/Department: [facility] 5W PROGRESSIVE CARE  Physical Therapy treatment session- COTX with OT  This note serves as D/C summary if patient is discharged prior to next treatment session.     Name: Reji Kaufman  : 1940  MRN: [id number]  Date of Service: 2024    Discharge Recommendations:  Long Term Care with PT, Long Term Care without PT (continued therapy at discretion of facility)   PT Equipment Recommendations  Equipment Needed: No      Patient Diagnosis(es): The primary encounter diagnosis was Altered mental status, unspecified altered mental status type. Diagnoses of Hypernatremia, EVELINA (acute kidney injury) (HCC), Urinary tract infection associated with indwelling urethral catheter, initial encounter (HCC), Septicemia (HCC), and Pneumonia due to infectious organism, unspecified laterality, unspecified part of lung were also pertinent to this visit.  Past Medical History:  has a past medical history of Alcohol abuse, AV block, 1st degree, and Hypertension.  Past Surgical History:  has no past surgical history on file.    Assessment   Body Structures, Functions, Activity Limitations Requiring Skilled Therapeutic Intervention: Decreased functional mobility   Assessment: pt is an 82 yo male who was adm to hosp from his LTC facility for AMS; PMHx for metastatic prostate CA; unsure of pt's most recent baseline status however approx a month ago was able to amb with therapist with assist; Pt currently needing max A of 2 for bed mobility.  Limited by RLE pain and cognition.  Recommend pt return to ECF with or without therapy at discretion of facility  Treatment Diagnosis: decreased functional mobility  Therapy Prognosis: Guarded  Requires PT Follow-Up: Yes  Activity Tolerance  Activity Tolerance: Patient limited by pain;Treatment limited secondary to decreased cognition     Plan   Physical Therapy Plan  General Plan: 2-3 times per week  Current Treatment Recommendations: Functional mobility 
Pt admitted to room 5119 at 0600 on 5/15. Pt is alert and orientated to self. VSS. Sinus tach on the tele monitor. Pt was brought up from the ED with his chronic corado from his nursing home- MD Jacobson was messaged and he gave the okay for me to change out his corado. 16F corado placed. Pts contact Sanjay Kaufman is marked to notify on admission- I attempted to call him but ended up having to leaving a voicemail asking him to call us back when he is available. Seizure precautions in place, bed alarm on, call light in reach.   Thaddeus Navas RN    
Pt discharged at this time. Reviewed all discharge instructions and follow up appts with patient. Patient states that he understands. PIV and tele removed at this time without complications.pt being discharged with PICC line for IV ABX.  Pt does not voice any questions or concerns at this time. Belongings in hand.  Patient assisted to vehicle at this time. Patient tolerated well.    
Pt observed at bedside for 15 mins after starting transfusion.  No signs or symptoms at this time.    
Pt removed PICC line, IV team notified and new order obtained to replace PICC. Awaiting return call from IV team.  Socrates Fletcher RN  5/21/2024    
Pt resting comfortably in bed with 2 rails up. Denies any request at this time. All questions answered. Call light and bedside table in reach, along with all personal items.   
Pt resting comfortably in bed with 2 rails up. Denies any request at this time. All questions answered. Call light and bedside table in reach, along with all personal items.   
Pt resting comfortably in bed with 2 rails up. Denies any request at this time. All questions answered. Call light and bedside table in reach, along with all personal items.       Order sodium blood drawn  
Pt resting comfortably in bed, still denies pain or nausea at this time, oriented to person and place, states is ready to go upstairs, report called to STEVEN mauricio upstairs. Vss. No signs of distress noted at this time.  
Pt started consuming Golytely per MD order. Pt consumed about 500 ml thus far and tolerating well.    
Pt taken upstairs to inpatient room via stretcher. No signs of distress noted at time of transfer back to inpatient room.  
Pt to pacu from ENDO. Pt asleep at arrival, 2L via NC per CRNA. Placed on monitor, vss. Abdo soft to touch, no pain with palpation, no signs of distress noted at this time, report obtained.  
Pt's granddaughter called to enquire about pt's status.Updates given.    Encouraged and assisted  pt to eat his dinner and he had a little bit.He tolerated well.  
RN noted that patient does not have a documented BM. Patient has been admitted for 5 days. PRN Glycolax given. RN also messaged MD Vlad via [employer] regarding lack of BM. MD aware and ordered a one-time dose of Bisacodyl. RN gave.   
RN notified by lab, hgb 6.9.  Perfect serve to MD.     Hgb recheck by lab, 6.7.     No therapy plan of the specified type found.  
RN spoke with pt's son Sanjay melgar PRBC transfusion, hgb < 7. Son gave verbal phone consent.      Electronically signed by Radha Avila RN on 5/16/2024 at 10:59 AM   
Sedation provided by anesthesia. see anesthesia record for vitals and medication administration   
TAT EKG completed. Final results: Sinus Rhythm with 1st degree AV Block septalinfarct, age undertermined. Abnormal ECG. Notified Dr. Chu. No new orders. Pt currently in bed resting, call light within reach. No distress or discomfort notified.   
[place] to self and place.  Stutters with conversation. Telephone consent received from Sanjay Kaufman son for procedure and anesthesia.  
      General: Alert, Awake, NAD  HEENT: Normocephalic, atraumatic  Neck: No Thyromegaly, Trachea is midline  Eyes: EOMI, CHINYERE  Chest: diminished to auscultation, no intercostal retractions  CVS: RRR, no murmur, no rub  Abdomen: soft, non tender  Extremities: no edema, no cyanosis.  Skin: dry  Psych: confused; AAO x 1  : corado in place     LAB DATA:    CBC:   Lab Results   Component Value Date/Time    WBC 18.5 05/16/2024 06:58 AM    RBC 2.83 05/16/2024 06:58 AM    HGB 6.9 05/16/2024 06:58 AM    HCT 22.5 05/16/2024 06:58 AM    MCV 79.8 05/16/2024 06:58 AM    MCH 24.3 05/16/2024 06:58 AM    MCHC 30.4 05/16/2024 06:58 AM    RDW 16.3 05/16/2024 06:58 AM     05/16/2024 06:58 AM    MPV 10.7 05/16/2024 06:58 AM     BMP:    Lab Results   Component Value Date/Time     05/16/2024 09:32 AM    K 3.3 05/16/2024 06:58 AM    K 4.3 05/15/2024 12:39 AM     05/16/2024 06:58 AM    CO2 24 05/16/2024 06:58 AM    BUN 59 05/16/2024 06:58 AM    CREATININE 3.4 05/16/2024 06:58 AM    CALCIUM 8.1 05/16/2024 06:58 AM    LABGLOM 17 05/16/2024 06:58 AM    LABGLOM 74 04/18/2024 06:05 AM    GLUCOSE 123 05/16/2024 06:58 AM     Ionized Calcium:  No components found for: \"IONCA\"  Magnesium:    Lab Results   Component Value Date/Time    MG 2.00 05/16/2024 06:58 AM     Phosphorus:    Lab Results   Component Value Date/Time    PHOS 2.5 05/16/2024 06:58 AM     U/A:    Lab Results   Component Value Date/Time    COLORU Yellow 05/15/2024 12:39 AM    PHUR 6.0 05/15/2024 12:39 AM    PHUR 6.0 04/11/2024 08:55 AM    WBCUA 3892 05/15/2024 12:39 AM    RBCUA 24 05/15/2024 12:39 AM    MUCUS 1+ 05/15/2024 12:39 AM    BACTERIA 4+ 05/15/2024 12:39 AM    CLARITYU TURBID 05/15/2024 12:39 AM    LEUKOCYTESUR LARGE 05/15/2024 12:39 AM    UROBILINOGEN 1.0 05/15/2024 12:39 AM    BILIRUBINUR Negative 05/15/2024 12:39 AM    BLOODU LARGE 05/15/2024 12:39 AM    GLUCOSEU Negative 05/15/2024 12:39 AM    AMORPHOUS 2+ 04/11/2024 08:55 AM 
    IMAGING:  Reviewed reports     CT ABDOMEN PELVIS WO CONTRAST   Final Result   Trace pelvicaliectasis on the right.  Urothelial thickening is seen on the   right.  Fat stranding surrounds the bladder.  Correlate with urinalysis to   exclude underlying infection.       Increased metastatic adenopathy in the abdomen and pelvis.       Osseous metastatic disease, similar to prior         Scheduled Meds:   polyethylene glycol  4,000 mL Oral Once    lidocaine 1 % injection  5 mL IntraDERmal Once    sodium chloride flush  5-40 mL IntraVENous 2 times per day    ertapenem (INVanz) IV  500 mg IntraVENous Q24H    lidocaine 1 % injection  5 mL IntraDERmal Once    finasteride  5 mg Oral Daily    hydrALAZINE  10 mg Oral BID    metoprolol tartrate  25 mg Oral BID    NIFEdipine  30 mg Oral Daily    tamsulosin  0.4 mg Oral Daily    senna  1 tablet Oral QHS    enoxaparin  30 mg SubCUTAneous Daily       Continuous Infusions:   sodium chloride      sodium chloride      sodium chloride      sodium chloride      sodium chloride 5 mL/hr at 05/19/24 0917    dextrose 75 mL/hr at 05/20/24 1238       PRN Meds:  sodium chloride, sodium chloride flush, sodium chloride, sodium chloride, morphine, sodium chloride, sodium chloride, ondansetron **OR** ondansetron, polyethylene glycol, acetaminophen **OR** acetaminophen      Assessment:     Dementia, met prostate cancer  Resident ECF    Urinary retention, chronic corado  Complicated UTI (assoc w corado, resistant organism)  Klebsiella bacteremia, ESBL,  assoc      Plan:     Cont Meropenem    Midline   See AGUILAR    Medical Decision Making:  The following items were considered in medical decision making:  Discussion of patient care with other providers  Reviewed clinical lab tests  Reviewed radiology tests  Reviewed other diagnostic tests/interventions  Microbiology cultures and other micro tests reviewed      Discussed with pt  Mykel Jose MD    INFUSION ORDERS:  Invanz 1 gm iv daily through 
650 mg, 650 mg, Oral, Q6H PRN **OR** acetaminophen (TYLENOL) suppository 650 mg, 650 mg, Rectal, Q6H PRN  dextrose 5 % solution, , IntraVENous, Continuous    Physical    VITALS:  /64   Pulse 97   Temp 98.7 °F (37.1 °C) (Axillary)   Resp 16   Ht 1.829 m (6')   Wt 79.3 kg (174 lb 13.2 oz)   SpO2 97%   BMI 23.71 kg/m²   TEMPERATURE:  Current - Temp: 98.7 °F (37.1 °C); Max - Temp  Av.6 °F (37 °C)  Min: 98.1 °F (36.7 °C)  Max: 99.2 °F (37.3 °C)    NAD  Eyes: No icterus  RRR  Lungs CTA Bilaterally, normal effort  Abdomen soft, ND, NT, Bowel sounds normal.  Ext: no edema  Neuro: No tremor  Psych: A&Ox3    Data    Data Review:    Recent Labs     24  0614 24  0605 24  1615 24  0445   WBC 12.3* 10.1  --  13.2*   HGB 7.4* 6.5* 7.7* 8.1*   HCT 23.1* 20.7* 23.7* 24.9*   MCV 79.1* 79.3*  --  78.7*    240  --  185     Recent Labs     24  0614 24  0936 24  0605 24  0900 24  0445      < > 143  143 143 141   K 4.0  --  4.1  --  4.5   *  --  110  --  106   CO2   --  24  --  25   PHOS 3.8  --  3.6  --  3.6   BUN 45*  --  44*  --  40*   CREATININE 2.6*  --  2.3*  --  2.0*    < > = values in this interval not displayed.     No results for input(s): \"AST\", \"ALT\", \"BILIDIR\", \"BILITOT\", \"ALKPHOS\" in the last 72 hours.    Invalid input(s): \"ALB\"  No results for input(s): \"LIPASE\", \"AMYLASE\" in the last 72 hours.  No results for input(s): \"PROTIME\", \"INR\" in the last 72 hours.  Invalid input(s): \"PTT\"    ASSESSMENT:  83 year old with a history of metastatic prostate cancer, HTN, etoh abuse, 1st degree AV block, vascular dementia presented from [facility] on  with confusion.  Dx with pneumonia and a UTI and started on antibiotics.  Also found with severe hypernatremia.  We are consulted for anemia.  Hgb 8.8 /.  8.1 on presentation here .  Dropped to 6.7 on  and transfused up to 8.2 and then dropped down to 6.5.  Ferritin 
age over 65    Estimated Daily Nutrient Needs:  Energy Requirements Based On: Kcal/kg  Weight Used for Energy Requirements: Current  Energy (kcal/day): 4746-4608 (25-30 kcal/68.1 kg)  Weight Used for Protein Requirements: Current  Protein (g/day): 82-89 (1.2-1.3 g/68.1 kg)  Method Used for Fluid Requirements: 1 ml/kcal  Fluid (ml/day):      Nutrition Diagnosis:   Inadequate oral intake related to increase demand for energy/nutrients as evidenced by weight loss (dehydration, hypernatremia)    Nutrition Interventions:   Food and/or Nutrient Delivery: Continue Current Diet, Continue Oral Nutrition Supplement  Nutrition Education/Counseling:  (monitor need)  Coordination of Nutrition Care: Continue to monitor while inpatient       Goals:     Goals: PO intake 75% or greater       Nutrition Monitoring and Evaluation:      Food/Nutrient Intake Outcomes: Food and Nutrient Intake, Supplement Intake  Physical Signs/Symptoms Outcomes: Nutrition Focused Physical Findings, Biochemical Data    Discharge Planning:    Too soon to determine     DANA SPRINGER RD, LD  Contact: Office: [phone]; [place]: [phone]      
  Social/Functional History  Social/Functional History  Type of Home: Facility ([facility])  Additional Comments: Unclear baseline, he was ambulatory w/ RW last admission (April, 2024)  Vision/Hearing  Vision  Vision:  (THERESA pt kept his eyes closed through most of session)  Hearing  Hearing:  (appears functional for purpose of eval)    Cognition   Orientation  Overall Orientation Status: Impaired  Orientation Level: Oriented to person  Cognition  Overall Cognitive Status: Exceptions  Arousal/Alertness: Delayed responses to stimuli  Following Commands: Follows one step commands with repetition  Attention Span: Difficulty attending to directions  Safety Judgement: Decreased awareness of need for assistance;Decreased awareness of need for safety  Insights: Decreased awareness of deficits  Initiation: Requires cues for all  Sequencing: Requires cues for all     Objective     AROM RLE (degrees)  RLE General AROM: limited at his hip due to pain (kept hip in flexed position and reported thigh pain with movements; knee was flexed and only allowed slight ROM due to pain in hip/thigh  AROM LLE (degrees)  LLE AROM : WFL  Strength RLE  Comment: limited by pain  Strength LLE  Comment: able to move in bed but appears weak           Bed mobility  Rolling to Right: Maximum assistance  Scooting: Maximal assistance;Dependent/Total  Bed Mobility Comments: Max-dependent for repositioning in bed, pt declining EOB/OOB  Transfers  Comment: pt declined getting up; pt will likely be a maxi move           AM-PAC - Mobility    AM-PAC Basic Mobility - Inpatient   How much help is needed turning from your back to your side while in a flat bed without using bedrails?: A Lot  How much help is needed moving from lying on your back to sitting on the side of a flat bed without using bedrails?: Total  How much help is needed moving to and from a bed to a chair?: Total  How much help is needed standing up from a chair using your arms?: Total  How 
Grade 1 diastolic dysfx.               - Closely monitor volume status    Anemia   - Hgb 8.4 g/dL   - Iron adequate    - EGD on 5/21/2024 reveals tortuous esophagus and otherwise normal EGD.     Dementia    Will discuss with nephrology attending physician, Dr. Hernandez.  See attestation for additional recommendations.    SEBASTIAN Lazo - CNP        
Scattered colonic diverticula are seen.  No bowel obstruction noted. Abnormal soft tissue nodularity seen within the pelvis.  An index nodule in the right lower quadrant which previously measured 3.9 x 2.7 cm appears increased in size, measuring up to 4 .nt 1 cm.  Adjacent soft tissue nodularity also appears increased. Pelvis: Rose catheter seen within the bladder.  Bladder is collapsed..  Fat stranding surrounds the bladder Nodularity posterior to the bladder also appears increased along with increased nodularity in the region of the obturator internus musculature on the right. An enlarged iliac chain node on the right on image number 130 measures 2.7 cm by 2.1 cm, previously measuring 1.9 cm by 1.2 cm Peritoneum/Retroperitoneum: Atherosclerotic change seen in aorta.  Tiny retroperitoneal nodes are seen. Bones/Soft Tissues: Spurring is seen in the spine and hips.  Scattered sclerotic metastasis are seen     Trace pelvicaliectasis on the right.  Urothelial thickening is seen on the right.  Fat stranding surrounds the bladder.  Correlate with urinalysis to exclude underlying infection. Increased metastatic adenopathy in the abdomen and pelvis. Osseous metastatic disease, similar to prior     CT HEAD WO CONTRAST    Result Date: 5/14/2024  EXAMINATION: CT OF THE HEAD WITHOUT CONTRAST  5/14/2024 11:04 pm TECHNIQUE: CT of the head was performed without the administration of intravenous contrast. Automated exposure control, iterative reconstruction, and/or weight based adjustment of the mA/kV was utilized to reduce the radiation dose to as low as reasonably achievable. COMPARISON: 04/11/2024 HISTORY: ORDERING SYSTEM PROVIDED HISTORY: ams TECHNOLOGIST PROVIDED HISTORY: Has a \"code stroke\" or \"stroke alert\" been called?->No Reason for exam:->ams Reason for Exam: ams FINDINGS: BRAIN/VENTRICLES: Ventricles are midline in position.  No intracerebral masses are identified.  No mass effect.  No midline shift.  No acute 
threat to bodily function.   There is potential for severe exacerbation of infection/side effects of treatment.  Therapy requires intensive monitoring for antimicrobial agent toxicity.     Thanks for allowing me to participate in your patient's care please call me with any questions or concerns.    Dr. Dorys Quiñones MD  Infectious Disease  [facility] Physician  Phone: [phone]   Fax : [phone]

## 2024-05-23 ENCOUNTER — TELEPHONE (OUTPATIENT)
Dept: INFECTIOUS DISEASES | Age: 84
End: 2024-05-23

## 2024-05-23 DIAGNOSIS — Z16.12 ESBL (EXTENDED SPECTRUM BETA-LACTAMASE) PRODUCING BACTERIA INFECTION: Primary | ICD-10-CM

## 2024-05-23 DIAGNOSIS — A49.9 ESBL (EXTENDED SPECTRUM BETA-LACTAMASE) PRODUCING BACTERIA INFECTION: Primary | ICD-10-CM

## 2024-05-23 NOTE — TELEPHONE ENCOUNTER
Spoke with Aayush nurse at facility and reviewed IV abx orders and weekly labs needed.  Gave fax number.  She verbalized understanding

## 2024-05-31 ENCOUNTER — TELEPHONE (OUTPATIENT)
Dept: INFECTIOUS DISEASES | Age: 84
End: 2024-05-31

## 2024-06-03 ENCOUNTER — TELEPHONE (OUTPATIENT)
Dept: INFECTIOUS DISEASES | Age: 84
End: 2024-06-03

## 2024-06-14 PROBLEM — N39.0 UTI (URINARY TRACT INFECTION): Status: RESOLVED | Noted: 2024-05-15 | Resolved: 2024-06-14

## 2024-06-23 ENCOUNTER — APPOINTMENT (OUTPATIENT)
Dept: GENERAL RADIOLOGY | Age: 84
DRG: 698 | End: 2024-06-23
Payer: MEDICARE

## 2024-06-23 ENCOUNTER — APPOINTMENT (OUTPATIENT)
Dept: CT IMAGING | Age: 84
DRG: 698 | End: 2024-06-23
Payer: MEDICARE

## 2024-06-23 ENCOUNTER — HOSPITAL ENCOUNTER (INPATIENT)
Age: 84
LOS: 7 days | Discharge: NURSING, REHAB OR LONG-TERM CARE | DRG: 698 | End: 2024-07-01
Attending: HOSPITALIST | Admitting: HOSPITALIST
Payer: MEDICARE

## 2024-06-23 DIAGNOSIS — A41.9 SEPTICEMIA (HCC): ICD-10-CM

## 2024-06-23 DIAGNOSIS — T83.511A URINARY TRACT INFECTION ASSOCIATED WITH INDWELLING URETHRAL CATHETER, INITIAL ENCOUNTER (HCC): ICD-10-CM

## 2024-06-23 DIAGNOSIS — R41.82 ALTERED MENTAL STATUS, UNSPECIFIED ALTERED MENTAL STATUS TYPE: Primary | ICD-10-CM

## 2024-06-23 DIAGNOSIS — N39.0 URINARY TRACT INFECTION ASSOCIATED WITH INDWELLING URETHRAL CATHETER, INITIAL ENCOUNTER (HCC): ICD-10-CM

## 2024-06-23 LAB
ALBUMIN SERPL-MCNC: 3.1 G/DL (ref 3.4–5)
ALBUMIN/GLOB SERPL: 0.6 {RATIO} (ref 1.1–2.2)
ALP SERPL-CCNC: 122 U/L (ref 40–129)
ALT SERPL-CCNC: 9 U/L (ref 10–40)
ANION GAP SERPL CALCULATED.3IONS-SCNC: 16 MMOL/L (ref 3–16)
AST SERPL-CCNC: 59 U/L (ref 15–37)
BACTERIA URNS QL MICRO: ABNORMAL /HPF
BASOPHILS # BLD: 0.2 K/UL (ref 0–0.2)
BASOPHILS NFR BLD: 1.5 %
BILIRUB SERPL-MCNC: <0.2 MG/DL (ref 0–1)
BILIRUB UR QL STRIP.AUTO: NEGATIVE
BUN SERPL-MCNC: 53 MG/DL (ref 7–20)
CALCIUM SERPL-MCNC: 9.9 MG/DL (ref 8.3–10.6)
CHARACTER UR: ABNORMAL
CHLORIDE SERPL-SCNC: 98 MMOL/L (ref 99–110)
CLARITY UR: ABNORMAL
CO2 SERPL-SCNC: 20 MMOL/L (ref 21–32)
COLOR UR: ABNORMAL
CREAT SERPL-MCNC: 3.5 MG/DL (ref 0.8–1.3)
DEPRECATED RDW RBC AUTO: 19.7 % (ref 12.4–15.4)
EOSINOPHIL # BLD: 0.2 K/UL (ref 0–0.6)
EOSINOPHIL NFR BLD: 1.5 %
EPI CELLS #/AREA URNS AUTO: 2 /HPF (ref 0–5)
GFR SERPLBLD CREATININE-BSD FMLA CKD-EPI: 17 ML/MIN/{1.73_M2}
GLUCOSE SERPL-MCNC: 94 MG/DL (ref 70–99)
GLUCOSE UR STRIP.AUTO-MCNC: NEGATIVE MG/DL
HCT VFR BLD AUTO: 27 % (ref 40.5–52.5)
HGB BLD-MCNC: 8.2 G/DL (ref 13.5–17.5)
HGB UR QL STRIP.AUTO: ABNORMAL
KETONES UR STRIP.AUTO-MCNC: NEGATIVE MG/DL
LACTATE BLDV-SCNC: 3.4 MMOL/L (ref 0.4–2)
LEUKOCYTE ESTERASE UR QL STRIP.AUTO: ABNORMAL
LYMPHOCYTES # BLD: 1.1 K/UL (ref 1–5.1)
LYMPHOCYTES NFR BLD: 7.7 %
MCH RBC QN AUTO: 23.6 PG (ref 26–34)
MCHC RBC AUTO-ENTMCNC: 30.6 G/DL (ref 31–36)
MCV RBC AUTO: 77.2 FL (ref 80–100)
MONOCYTES # BLD: 1.1 K/UL (ref 0–1.3)
MONOCYTES NFR BLD: 7.6 %
NEUTROPHILS # BLD: 12.1 K/UL (ref 1.7–7.7)
NEUTROPHILS NFR BLD: 81.7 %
NITRITE UR QL STRIP.AUTO: NEGATIVE
PH UR STRIP.AUTO: 6 [PH] (ref 5–8)
PLATELET # BLD AUTO: 253 K/UL (ref 135–450)
PMV BLD AUTO: 8.9 FL (ref 5–10.5)
POTASSIUM SERPL-SCNC: 5 MMOL/L (ref 3.5–5.1)
PROT SERPL-MCNC: 8.5 G/DL (ref 6.4–8.2)
PROT UR STRIP.AUTO-MCNC: 100 MG/DL
RBC # BLD AUTO: 3.5 M/UL (ref 4.2–5.9)
RBC CLUMPS #/AREA URNS AUTO: 505 /HPF (ref 0–4)
SODIUM SERPL-SCNC: 134 MMOL/L (ref 136–145)
SP GR UR STRIP.AUTO: 1.01 (ref 1–1.03)
UA COMPLETE W REFLEX CULTURE PNL UR: YES
UA DIPSTICK W REFLEX MICRO PNL UR: YES
URN SPEC COLLECT METH UR: ABNORMAL
UROBILINOGEN UR STRIP-ACNC: 0.2 E.U./DL
WBC # BLD AUTO: 14.8 K/UL (ref 4–11)
WBC #/AREA URNS AUTO: 149 /HPF (ref 0–5)

## 2024-06-23 PROCEDURE — 85025 COMPLETE CBC W/AUTO DIFF WBC: CPT

## 2024-06-23 PROCEDURE — 83605 ASSAY OF LACTIC ACID: CPT

## 2024-06-23 PROCEDURE — 87086 URINE CULTURE/COLONY COUNT: CPT

## 2024-06-23 PROCEDURE — 87040 BLOOD CULTURE FOR BACTERIA: CPT

## 2024-06-23 PROCEDURE — 87635 SARS-COV-2 COVID-19 AMP PRB: CPT

## 2024-06-23 PROCEDURE — 2580000003 HC RX 258: Performed by: PHYSICIAN ASSISTANT

## 2024-06-23 PROCEDURE — 87804 INFLUENZA ASSAY W/OPTIC: CPT

## 2024-06-23 PROCEDURE — 70450 CT HEAD/BRAIN W/O DYE: CPT

## 2024-06-23 PROCEDURE — 80053 COMPREHEN METABOLIC PANEL: CPT

## 2024-06-23 PROCEDURE — 99285 EMERGENCY DEPT VISIT HI MDM: CPT

## 2024-06-23 PROCEDURE — 81001 URINALYSIS AUTO W/SCOPE: CPT

## 2024-06-23 PROCEDURE — 6370000000 HC RX 637 (ALT 250 FOR IP): Performed by: PHYSICIAN ASSISTANT

## 2024-06-23 PROCEDURE — 71046 X-RAY EXAM CHEST 2 VIEWS: CPT

## 2024-06-23 PROCEDURE — 6360000002 HC RX W HCPCS: Performed by: PHYSICIAN ASSISTANT

## 2024-06-23 PROCEDURE — 96365 THER/PROPH/DIAG IV INF INIT: CPT

## 2024-06-23 RX ORDER — VANCOMYCIN 1.75 G/350ML
1250 INJECTION, SOLUTION INTRAVENOUS ONCE
Status: COMPLETED | OUTPATIENT
Start: 2024-06-23 | End: 2024-06-24

## 2024-06-23 RX ORDER — 0.9 % SODIUM CHLORIDE 0.9 %
30 INTRAVENOUS SOLUTION INTRAVENOUS ONCE
Status: COMPLETED | OUTPATIENT
Start: 2024-06-23 | End: 2024-06-23

## 2024-06-23 RX ORDER — ACETAMINOPHEN 325 MG/1
650 TABLET ORAL ONCE
Status: COMPLETED | OUTPATIENT
Start: 2024-06-23 | End: 2024-06-23

## 2024-06-23 RX ADMIN — MEROPENEM 1000 MG: 1 INJECTION INTRAVENOUS at 23:53

## 2024-06-23 RX ADMIN — ACETAMINOPHEN 325MG 650 MG: 325 TABLET ORAL at 23:49

## 2024-06-23 RX ADMIN — SODIUM CHLORIDE 2139 ML: 9 INJECTION, SOLUTION INTRAVENOUS at 21:37

## 2024-06-23 ASSESSMENT — LIFESTYLE VARIABLES
HOW OFTEN DO YOU HAVE A DRINK CONTAINING ALCOHOL: MONTHLY OR LESS
HOW MANY STANDARD DRINKS CONTAINING ALCOHOL DO YOU HAVE ON A TYPICAL DAY: PATIENT DOES NOT DRINK

## 2024-06-23 ASSESSMENT — PAIN - FUNCTIONAL ASSESSMENT: PAIN_FUNCTIONAL_ASSESSMENT: NONE - DENIES PAIN

## 2024-06-24 PROBLEM — N39.0 UTI (URINARY TRACT INFECTION) DUE TO URINARY INDWELLING CATHETER (HCC): Status: ACTIVE | Noted: 2024-06-24

## 2024-06-24 PROBLEM — R65.20 SEVERE SEPSIS (HCC): Status: ACTIVE | Noted: 2024-06-24

## 2024-06-24 PROBLEM — R74.8 ABNORMAL LIVER ENZYMES: Status: ACTIVE | Noted: 2024-06-24

## 2024-06-24 PROBLEM — E87.1 ACUTE HYPONATREMIA: Status: ACTIVE | Noted: 2024-06-24

## 2024-06-24 PROBLEM — A41.9 SEVERE SEPSIS (HCC): Status: ACTIVE | Noted: 2024-06-24

## 2024-06-24 PROBLEM — T83.511A UTI (URINARY TRACT INFECTION) DUE TO URINARY INDWELLING CATHETER (HCC): Status: ACTIVE | Noted: 2024-06-24

## 2024-06-24 PROBLEM — I10 HTN (HYPERTENSION): Status: ACTIVE | Noted: 2024-06-24

## 2024-06-24 PROBLEM — G93.41 ACUTE METABOLIC ENCEPHALOPATHY: Status: ACTIVE | Noted: 2024-06-24

## 2024-06-24 PROBLEM — D64.9 CHRONIC ANEMIA: Status: ACTIVE | Noted: 2024-06-24

## 2024-06-24 PROBLEM — N18.32 CKD STAGE 3B, GFR 30-44 ML/MIN (HCC): Status: ACTIVE | Noted: 2024-06-24

## 2024-06-24 LAB
ALBUMIN SERPL-MCNC: 2.8 G/DL (ref 3.4–5)
ALBUMIN/GLOB SERPL: 0.6 {RATIO} (ref 1.1–2.2)
ALP SERPL-CCNC: 110 U/L (ref 40–129)
ALT SERPL-CCNC: 7 U/L (ref 10–40)
ANION GAP SERPL CALCULATED.3IONS-SCNC: 13 MMOL/L (ref 3–16)
ANISOCYTOSIS BLD QL SMEAR: ABNORMAL
AST SERPL-CCNC: 48 U/L (ref 15–37)
BASOPHILS # BLD: 0.3 K/UL (ref 0–0.2)
BASOPHILS NFR BLD: 2 %
BILIRUB SERPL-MCNC: 0.3 MG/DL (ref 0–1)
BUN SERPL-MCNC: 55 MG/DL (ref 7–20)
CALCIUM SERPL-MCNC: 9.2 MG/DL (ref 8.3–10.6)
CHLORIDE SERPL-SCNC: 104 MMOL/L (ref 99–110)
CO2 SERPL-SCNC: 20 MMOL/L (ref 21–32)
CREAT SERPL-MCNC: 3.4 MG/DL (ref 0.8–1.3)
DEPRECATED RDW RBC AUTO: 19.5 % (ref 12.4–15.4)
EOSINOPHIL # BLD: 0.4 K/UL (ref 0–0.6)
EOSINOPHIL NFR BLD: 3 %
FLUAV RNA UPPER RESP QL NAA+PROBE: NEGATIVE
FLUBV AG NPH QL: NEGATIVE
GFR SERPLBLD CREATININE-BSD FMLA CKD-EPI: 17 ML/MIN/{1.73_M2}
GLUCOSE SERPL-MCNC: 89 MG/DL (ref 70–99)
HAV IGM SERPL QL IA: NORMAL
HBV CORE IGM SERPL QL IA: NORMAL
HBV SURFACE AG SERPL QL IA: NORMAL
HCT VFR BLD AUTO: 22.5 % (ref 40.5–52.5)
HCV AB SERPL QL IA: NORMAL
HGB BLD-MCNC: 7.3 G/DL (ref 13.5–17.5)
HYPOCHROMIA BLD QL SMEAR: ABNORMAL
LACTATE BLDV-SCNC: 2.3 MMOL/L (ref 0.4–2)
LYMPHOCYTES # BLD: 1.8 K/UL (ref 1–5.1)
LYMPHOCYTES NFR BLD: 12 %
MCH RBC QN AUTO: 24.8 PG (ref 26–34)
MCHC RBC AUTO-ENTMCNC: 32.3 G/DL (ref 31–36)
MCV RBC AUTO: 76.8 FL (ref 80–100)
METAMYELOCYTES NFR BLD MANUAL: 1 %
MONOCYTES # BLD: 1.2 K/UL (ref 0–1.3)
MONOCYTES NFR BLD: 8 %
NEUTROPHILS # BLD: 11 K/UL (ref 1.7–7.7)
NEUTROPHILS NFR BLD: 69 %
NEUTS BAND NFR BLD MANUAL: 5 % (ref 0–7)
PLATELET # BLD AUTO: 261 K/UL (ref 135–450)
PLATELET BLD QL SMEAR: ADEQUATE
PMV BLD AUTO: 8 FL (ref 5–10.5)
POTASSIUM SERPL-SCNC: 4.7 MMOL/L (ref 3.5–5.1)
PROT SERPL-MCNC: 7.3 G/DL (ref 6.4–8.2)
RBC # BLD AUTO: 2.92 M/UL (ref 4.2–5.9)
SARS-COV-2 RDRP RESP QL NAA+PROBE: NOT DETECTED
SLIDE REVIEW: ABNORMAL
SODIUM SERPL-SCNC: 137 MMOL/L (ref 136–145)
WBC # BLD AUTO: 14.7 K/UL (ref 4–11)

## 2024-06-24 PROCEDURE — 85025 COMPLETE CBC W/AUTO DIFF WBC: CPT

## 2024-06-24 PROCEDURE — 94760 N-INVAS EAR/PLS OXIMETRY 1: CPT

## 2024-06-24 PROCEDURE — 80053 COMPREHEN METABOLIC PANEL: CPT

## 2024-06-24 PROCEDURE — 6370000000 HC RX 637 (ALT 250 FOR IP): Performed by: HOSPITALIST

## 2024-06-24 PROCEDURE — 2580000003 HC RX 258: Performed by: HOSPITALIST

## 2024-06-24 PROCEDURE — 1200000000 HC SEMI PRIVATE

## 2024-06-24 PROCEDURE — 6360000002 HC RX W HCPCS: Performed by: PHYSICIAN ASSISTANT

## 2024-06-24 PROCEDURE — 99223 1ST HOSP IP/OBS HIGH 75: CPT | Performed by: INTERNAL MEDICINE

## 2024-06-24 PROCEDURE — 80074 ACUTE HEPATITIS PANEL: CPT

## 2024-06-24 PROCEDURE — 6360000002 HC RX W HCPCS: Performed by: HOSPITALIST

## 2024-06-24 RX ORDER — SODIUM CHLORIDE 9 MG/ML
INJECTION, SOLUTION INTRAVENOUS CONTINUOUS
Status: DISCONTINUED | OUTPATIENT
Start: 2024-06-24 | End: 2024-06-25

## 2024-06-24 RX ORDER — SENNOSIDES A AND B 8.6 MG/1
1 TABLET, FILM COATED ORAL NIGHTLY
Status: DISCONTINUED | OUTPATIENT
Start: 2024-06-24 | End: 2024-07-01 | Stop reason: HOSPADM

## 2024-06-24 RX ORDER — NIFEDIPINE 30 MG/1
30 TABLET, EXTENDED RELEASE ORAL DAILY
Status: DISCONTINUED | OUTPATIENT
Start: 2024-06-24 | End: 2024-07-01 | Stop reason: HOSPADM

## 2024-06-24 RX ORDER — SODIUM CHLORIDE 0.9 % (FLUSH) 0.9 %
5-40 SYRINGE (ML) INJECTION EVERY 12 HOURS SCHEDULED
Status: DISCONTINUED | OUTPATIENT
Start: 2024-06-24 | End: 2024-07-01 | Stop reason: HOSPADM

## 2024-06-24 RX ORDER — SODIUM CHLORIDE 9 MG/ML
INJECTION, SOLUTION INTRAVENOUS PRN
Status: DISCONTINUED | OUTPATIENT
Start: 2024-06-24 | End: 2024-07-01 | Stop reason: HOSPADM

## 2024-06-24 RX ORDER — MIRTAZAPINE 15 MG/1
7.5 TABLET, FILM COATED ORAL NIGHTLY
Status: DISCONTINUED | OUTPATIENT
Start: 2024-06-24 | End: 2024-07-01 | Stop reason: HOSPADM

## 2024-06-24 RX ORDER — FLUTICASONE PROPIONATE 50 MCG
1 SPRAY, SUSPENSION (ML) NASAL DAILY
Status: DISCONTINUED | OUTPATIENT
Start: 2024-06-24 | End: 2024-07-01 | Stop reason: HOSPADM

## 2024-06-24 RX ORDER — ENOXAPARIN SODIUM 100 MG/ML
30 INJECTION SUBCUTANEOUS DAILY
Status: DISCONTINUED | OUTPATIENT
Start: 2024-06-24 | End: 2024-06-30

## 2024-06-24 RX ORDER — OXYCODONE HYDROCHLORIDE 5 MG/1
5 TABLET ORAL EVERY 6 HOURS PRN
Status: ON HOLD | COMMUNITY
End: 2024-06-30 | Stop reason: HOSPADM

## 2024-06-24 RX ORDER — FINASTERIDE 5 MG/1
5 TABLET, FILM COATED ORAL DAILY
Status: DISCONTINUED | OUTPATIENT
Start: 2024-06-24 | End: 2024-07-01 | Stop reason: HOSPADM

## 2024-06-24 RX ORDER — SODIUM CHLORIDE 0.9 % (FLUSH) 0.9 %
5-40 SYRINGE (ML) INJECTION PRN
Status: DISCONTINUED | OUTPATIENT
Start: 2024-06-24 | End: 2024-07-01 | Stop reason: HOSPADM

## 2024-06-24 RX ORDER — LANOLIN ALCOHOL/MO/W.PET/CERES
6 CREAM (GRAM) TOPICAL NIGHTLY
Status: DISCONTINUED | OUTPATIENT
Start: 2024-06-24 | End: 2024-07-01 | Stop reason: HOSPADM

## 2024-06-24 RX ORDER — BENZONATATE 100 MG/1
100 CAPSULE ORAL 3 TIMES DAILY PRN
COMMUNITY

## 2024-06-24 RX ORDER — HYDRALAZINE HYDROCHLORIDE 20 MG/ML
5 INJECTION INTRAMUSCULAR; INTRAVENOUS EVERY 4 HOURS PRN
Status: DISCONTINUED | OUTPATIENT
Start: 2024-06-24 | End: 2024-07-01

## 2024-06-24 RX ORDER — ACETAMINOPHEN 650 MG/1
650 SUPPOSITORY RECTAL EVERY 6 HOURS PRN
Status: DISCONTINUED | OUTPATIENT
Start: 2024-06-24 | End: 2024-07-01 | Stop reason: HOSPADM

## 2024-06-24 RX ORDER — HYDRALAZINE HYDROCHLORIDE 10 MG/1
10 TABLET, FILM COATED ORAL EVERY 12 HOURS SCHEDULED
Status: DISCONTINUED | OUTPATIENT
Start: 2024-06-24 | End: 2024-06-26

## 2024-06-24 RX ORDER — ACETAMINOPHEN 325 MG/1
650 TABLET ORAL EVERY 6 HOURS PRN
Status: DISCONTINUED | OUTPATIENT
Start: 2024-06-24 | End: 2024-07-01 | Stop reason: HOSPADM

## 2024-06-24 RX ORDER — TAMSULOSIN HYDROCHLORIDE 0.4 MG/1
0.4 CAPSULE ORAL DAILY
Status: DISCONTINUED | OUTPATIENT
Start: 2024-06-24 | End: 2024-07-01 | Stop reason: HOSPADM

## 2024-06-24 RX ADMIN — ENOXAPARIN SODIUM 30 MG: 100 INJECTION SUBCUTANEOUS at 10:34

## 2024-06-24 RX ADMIN — SENNOSIDES 8.6 MG: 8.6 TABLET, FILM COATED ORAL at 20:30

## 2024-06-24 RX ADMIN — FLUTICASONE PROPIONATE 1 SPRAY: 50 SPRAY, METERED NASAL at 10:34

## 2024-06-24 RX ADMIN — VANCOMYCIN 1250 MG: 1.75 INJECTION, SOLUTION INTRAVENOUS at 00:51

## 2024-06-24 RX ADMIN — FINASTERIDE 5 MG: 5 TABLET, FILM COATED ORAL at 10:33

## 2024-06-24 RX ADMIN — SODIUM CHLORIDE, PRESERVATIVE FREE 10 ML: 5 INJECTION INTRAVENOUS at 21:39

## 2024-06-24 RX ADMIN — SODIUM CHLORIDE: 9 INJECTION, SOLUTION INTRAVENOUS at 02:37

## 2024-06-24 RX ADMIN — ACETAMINOPHEN 325MG 650 MG: 325 TABLET ORAL at 10:34

## 2024-06-24 RX ADMIN — TAMSULOSIN HYDROCHLORIDE 0.4 MG: 0.4 CAPSULE ORAL at 10:33

## 2024-06-24 RX ADMIN — MEROPENEM 500 MG: 500 INJECTION, POWDER, FOR SOLUTION INTRAVENOUS at 22:07

## 2024-06-24 RX ADMIN — ACETAMINOPHEN 325MG 650 MG: 325 TABLET ORAL at 20:30

## 2024-06-24 RX ADMIN — METOPROLOL TARTRATE 25 MG: 25 TABLET, FILM COATED ORAL at 05:36

## 2024-06-24 RX ADMIN — HYDRALAZINE HYDROCHLORIDE 10 MG: 10 TABLET, FILM COATED ORAL at 20:30

## 2024-06-24 RX ADMIN — NIFEDIPINE 30 MG: 30 TABLET, EXTENDED RELEASE ORAL at 10:33

## 2024-06-24 RX ADMIN — METOPROLOL TARTRATE 25 MG: 25 TABLET, FILM COATED ORAL at 20:30

## 2024-06-24 RX ADMIN — MEROPENEM 500 MG: 500 INJECTION, POWDER, FOR SOLUTION INTRAVENOUS at 10:33

## 2024-06-24 RX ADMIN — HYDRALAZINE HYDROCHLORIDE 10 MG: 10 TABLET, FILM COATED ORAL at 10:33

## 2024-06-24 ASSESSMENT — PAIN SCALES - GENERAL
PAINLEVEL_OUTOF10: 0
PAINLEVEL_OUTOF10: 0

## 2024-06-24 NOTE — PROGRESS NOTES
Patient seen and evaluated    Vitals stable    Agree with assessment and plan of my Colleague    NAD  S1-S2, RRR  CTA bilaterally  Awake and alert, AOX0      Continue present care as ordered  Labs in a.m.

## 2024-06-24 NOTE — ED PROVIDER NOTES
[facility] EMERGENCY DEPARTMENT  EMERGENCY DEPARTMENT ENCOUNTER        Pt Name: Reji Kaufman  MRN: [id number]  Birthdate 1940  Date of evaluation: 6/23/2024  Provider: KAREN Kearney  PCP: Luis Felipe Jackson MD  Note Started: 2:06 AM EDT 6/24/24      MELISSA. I have evaluated this patient.        CHIEF COMPLAINT       Chief Complaint   Patient presents with    Altered Mental Status     Pt family states pt is not acting right and may have a UTI. Dementia at baseline with a chronic corado.        HISTORY OF PRESENT ILLNESS: 1 or more Elements     History from : Patient and EMS    Limitations to history : Baseline dementia    Reji Kaufman is a 83 y.o. male who presents via EMS from the [facility] with concerns for altered mental status.  Per EMS the family had noted some increased confusion for about a week.  Patient has an indwelling Corado catheter with history of metastatic adenocarcinoma of the prostate as well as ESBL infection.  The patient denies complaints of abdominal pain vomiting or fever.    Nursing Notes were all reviewed and agreed with or any disagreements were addressed in the HPI.    REVIEW OF SYSTEMS :      Review of Systems   Constitutional:  Positive for fatigue.   Respiratory:  Negative for cough and shortness of breath.    Cardiovascular:  Negative for chest pain.   Gastrointestinal:  Negative for abdominal pain and vomiting.   Skin:  Negative for color change and wound.   Neurological:  Positive for weakness.   Psychiatric/Behavioral:  Positive for confusion.        Positives and Pertinent negatives as per HPI.     SURGICAL HISTORY     Past Surgical History:   Procedure Laterality Date    COLONOSCOPY N/A 5/21/2024    COLONOSCOPY performed by Jose Radford MD at [facility] ENDOSCOPY    UPPER GASTROINTESTINAL ENDOSCOPY N/A 5/21/2024    ESOPHAGOGASTRODUODENOSCOPY performed by Jose Radford MD at [facility] ENDOSCOPY       CURRENTMEDICATIONS       Previous  silhouette is within normal limits.  Shallow inspiration.  Mild bibasilar opacities.  No pneumothorax, vascular congestion, consolidation, or pleural effusion is identified.  No acute osseous abnormality.     Shallow inspiration with mild bibasilar opacities likely representing atelectasis.     CT HEAD WO CONTRAST    Result Date: 6/23/2024  EXAMINATION: CT OF THE HEAD WITHOUT CONTRAST  6/23/2024 7:24 pm TECHNIQUE: CT of the head was performed without the administration of intravenous contrast. Automated exposure control, iterative reconstruction, and/or weight based adjustment of the mA/kV was utilized to reduce the radiation dose to as low as reasonably achievable. COMPARISON: 05/14/2024 HISTORY: ORDERING SYSTEM PROVIDED HISTORY: ams TECHNOLOGIST PROVIDED HISTORY: Has a \"code stroke\" or \"stroke alert\" been called?->No Reason for exam:->ams Reason for Exam: ams FINDINGS: BRAIN/VENTRICLES: Moderate stable cerebral volume loss.  Periventricular, deep, and subcortical white matter hypodensity noted consistent with stable significant small vessel disease.  No intracranial hemorrhage, midline shift, or mass effect. ORBITS: The orbits/globes appear grossly unremarkable. SINUSES: The imaged paranasal sinuses and mastoid air cells appear unremarkable. SOFT TISSUES/SKULL:  There is atherosclerotic calcification of the cavernous carotid arteries.  No displaced calvarial fracture.  Soft tissue structures at the axial level of the skull base appear grossly unremarkable.     No acute intracranial abnormality.       No results found.    PROCEDURES   Unless otherwise noted below, none     Procedures    CRITICAL CARE TIME (.cctime)   I performed a total Critical Care time of 31 minutes, excluding separately reportable procedures.  There was a high probability of clinically significant/life threatening deterioration in the patient's condition which required my urgent intervention. Not limited to multiple reexaminations, discussions

## 2024-06-24 NOTE — CONSULTS
Clinical Pharmacy Note  Vancomycin Consult    Pharmacy consult received for one-time dose of vancomycin in the Emergency Department per ANNA Harrison.    Ht Readings from Last 1 Encounters:   06/23/24 1.829 m (6')        Wt Readings from Last 1 Encounters:   06/23/24 71.3 kg (157 lb 3 oz)         Assessment/Plan:  Vancomycin 1250mg x 1 in ED.  If vancomycin is to continue on admission and pharmacy is to manage dosing, please re-consult with admission orders.    Katie Fonseca, ZairaD

## 2024-06-24 NOTE — PROGRESS NOTES
Patient admitted to 4120 from ED via stretcher. Pulled over to bed without complication. /73, , temperature 100.2 F oral. Excess bedding removed. Stage 3 wound noted on sacrum. Cleaned and zinc cream applied. Two unstageable wounds on R heel/inner toe. Skin prep and BL boots applied. Rose intact draining yellow urine. 600mL output noted. IV fluids infusing. Patient repositioned and turned to R side. In bed in locked and lowest position with bed alarm on. Call light within reach. Electronically signed by Mariela Rothman RN on 6/24/2024 at 9:27 AM     Patient denies pain. Swallow screen passed with thin liquids however was having difficulty swallowing pills whole. Crushed rest of medications up in applesauce and patient tolerated well. IV antibiotics infusing per order. Andre Sanjay updated via phone. All questions answered and room number provided. Electronically signed by Mariela Rothman RN on 6/24/2024 at 10:40 AM

## 2024-06-24 NOTE — CONSULTS
Infectious Diseases Inpatient Consult Note      Reason for Consult:  Sepsis, UTI and ESBL Klebsiella history     Requesting Physician:  Dr. Fields     Primary Care Physician:  Luis Felipe Jackson MD    History Obtained From:  Epic    CHIEF COMPLAINT:     Chief Complaint   Patient presents with    Altered Mental Status     Pt family states pt is not acting right and may have a UTI. Dementia at baseline with a chronic corado.          HISTORY OF PRESENT ILLNESS:  83 y.o. old man with history of dementia admitted from nursing facility secondary  increasing confusion. Patient has a chronic Corado catheter in place for obstructive symptoms secondary to prostate issues. He was diagnosed to have urinary retention now has long term  Corado catheter placement as well as a diagnosed metastatic prostate cancer. He is following with oncology [facility] was recently started on Lupron injection. He was recently admitted to [facility] with sepsis secondary to Corado catheter associated urinary tract infection Klebsiella ESBL bacteremia.  He finished a long course of IV antibiotics at the nursing facility and his PICC line has been removed.  On admission he was found to have fever Tmax 100.2 WBC elevated creatinine 3.5 blood culture in process urine culture abnormal urine culture in process hepatitis panel negative, we are consulted for IV antibiotic recommendations    Past Medical History:    Past Medical History:   Diagnosis Date    Alcohol abuse     AV block, 1st degree     Hypertension        Past Surgical History:    Past Surgical History:   Procedure Laterality Date    COLONOSCOPY N/A 5/21/2024    COLONOSCOPY performed by Jose Radford MD at [facility] ENDOSCOPY    UPPER GASTROINTESTINAL ENDOSCOPY N/A 5/21/2024    ESOPHAGOGASTRODUODENOSCOPY performed by Jose Radford MD at [facility] ENDOSCOPY       Current Medications:    Outpatient Medications Marked as Taking for the 6/23/24 encounter (Hospital Encounter)   Medication  susceptibility.  Isolated two of two sets  Refer to culture [id number] for sensitivity results     Viral Culture:    Lab Results   Component Value Date/Time    COVID19 Not Detected 06/23/2024 11:55 PM     Urine Culture: No results for input(s): \"LABURIN\" in the last 72 hours.    Scheduled Meds:   sodium chloride flush  5-40 mL IntraVENous 2 times per day    enoxaparin  30 mg SubCUTAneous Daily    finasteride  5 mg Oral Daily    fluticasone  1 spray Each Nostril Daily    hydrALAZINE  10 mg Oral 2 times per day    melatonin  6 mg Oral Nightly    metoprolol tartrate  25 mg Oral BID    mirtazapine  7.5 mg Oral Nightly    NIFEdipine  30 mg Oral Daily    senna  1 tablet Oral Nightly    tamsulosin  0.4 mg Oral Daily    meropenem  500 mg IntraVENous Q12H       Continuous Infusions:   sodium chloride      sodium chloride 75 mL/hr at 06/24/24 0237       PRN Meds:  sodium chloride flush, sodium chloride, acetaminophen **OR** acetaminophen, hydrALAZINE    Imaging:   XR CHEST (2 VW)   Final Result   Shallow inspiration with mild bibasilar opacities likely representing   atelectasis.         CT HEAD WO CONTRAST   Final Result   No acute intracranial abnormality.             All pertinent images and reports for the current Hospitalization were reviewed by me.    IMPRESSION:    Patient Active Problem List   Diagnosis Code    EVELINA (acute kidney injury) ([facility]) N17.9    Altered mental status R41.82    Hypernatremia E87.0    Septicemia ([facility]) A41.9    ESBL (extended spectrum beta-lactamase) producing bacteria infection A49.9, Z16.12    Metastatic adenocarcinoma to prostate ([facility]) C79.82    Lactic acid acidosis E87.20    Sepsis with acute renal failure and tubular necrosis without septic shock ([facility]) A41.9, R65.20, N17.0    Severe sepsis ([facility]) A41.9, R65.20    Acute metabolic encephalopathy G93.41    CKD stage 3b, GFR 30-44 ml/min ([facility]) N18.32    HTN (hypertension) I10    UTI (urinary tract infection) due to urinary indwelling catheter ([facility])  T83.511A, N39.0    Abnormal liver enzymes R74.8    Chronic anemia D64.9    Acute hyponatremia E87.1        ICD-10-CM    1. Altered mental status, unspecified altered mental status type  R41.82       2. Urinary tract infection associated with indwelling urethral catheter, initial encounter ([facility])  T83.511A     N39.0       3. 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    PLAN :  Cont IV Meropenem x  500 mg  Q 12 hr  Blood cx in process  Trend WBC  Urine cx in process  Quiñonez replaced  May need IV if ESBL confirmed on the cx   Contact isolation      Discussed with patient/Family and Nursing     Medical Decision Making:  The following items were considered in medical decision making:  Discussion of patient care with other providers  Reviewed clinical lab tests  Reviewed radiology tests  Reviewed other diagnostic tests/interventions  Independent review of radiologic images  Independent review of  Microbiology cultures and other micro tests reviewed     Risk of Complications/Morbidity: High      Illness(es)/ Infection present that pose threat to bodily function.   There is potential for severe exacerbation of infection/side effects of treatment.  Therapy requires intensive monitoring for antimicrobial agent toxicity.     Thanks for allowing me to participate in your patient's care please call me with any questions or concerns.    Dr. Dorys Quiñones MD  Infectious

## 2024-06-24 NOTE — PROGRESS NOTES
Report called to Mariela, 4th floor RN to resume care. Updated on patients chief complaint, events while being in ED, current vitals. All questions answered.

## 2024-06-24 NOTE — CONSULTS
[facility]  Phone: [phone]  Fax: [phone]  [place]         Reason for Consult: EVELINA on CKD    Requesting Physician:  Dr. Fields    History Obtained From: electronic medical record    History of Present Ilness: 83-year-old -American gentleman who was sent from the nursing home with increasing confusion  History obtained from the medical records as patient is unable to provide any reliable information he has history of EVELINA in May 2024 in the setting of sepsis and urinary tract infection his creatinine improved to 1.8 mg at the time of discharge from a peak of 2.8 mg  He has history of metastatic prostate cancer hypertension dementia and chronic indwelling Rose catheter his previous cultures have grown ESBL Klebsiella in the urine and Enterococcus faecalis as well in May 2024  On presentation is noted to have a creatinine of 3.5 mg  He was hypertensive and tachycardic with a temperature of 100.2 degrees  Patient was started on meropenem and vancomycin  We are asked to see him for his EVELINA on CKD      Past Medical History:        Diagnosis Date    Alcohol abuse     AV block, 1st degree     Hypertension        Past Surgical History:        Procedure Laterality Date    COLONOSCOPY N/A 5/21/2024    COLONOSCOPY performed by Jose Radford MD at [facility] ENDOSCOPY    UPPER GASTROINTESTINAL ENDOSCOPY N/A 5/21/2024    ESOPHAGOGASTRODUODENOSCOPY performed by Jose Radford MD at [facility] ENDOSCOPY       Home Medications:    No current facility-administered medications on file prior to encounter.     Current Outpatient Medications on File Prior to Encounter   Medication Sig Dispense Refill    oxyCODONE (ROXICODONE) 5 MG immediate release tablet Take 1 tablet by mouth every 6 hours as needed for Pain. Max Daily Amount: 20 mg      benzonatate (TESSALON) 100 MG capsule Take 1 capsule by mouth 3 times daily as needed for Cough      bisacodyl (DULCOLAX) 10 MG suppository Place 1  Concern    Not on file   Social History Narrative    Not on file     Social Determinants of Health     Financial Resource Strain: Not on file   Food Insecurity: Patient Unable To Answer (6/24/2024)    Hunger Vital Sign     Worried About Running Out of Food in the Last Year: Patient unable to answer     Ran Out of Food in the Last Year: Patient unable to answer   Transportation Needs: Patient Unable To Answer (6/24/2024)    PRAPARE - Transportation     Lack of Transportation (Medical): Patient unable to answer     Lack of Transportation (Non-Medical): Patient unable to answer   Physical Activity: Not on file   Stress: Not on file   Social Connections: Moderately Integrated (6/24/2024)    Social Connections ([facility] HRSN)     If for any reason you need help with day-to-day activities such as bathing, preparing meals, shopping, managing finances, etc., do you get the help you need?: Not on file   Intimate Partner Violence: Not on file   Housing Stability: Patient Unable To Answer (6/24/2024)    Housing Stability Vital Sign     Unable to Pay for Housing in the Last Year: Patient unable to answer     Number of Places Lived in the Last Year: 1     Unstable Housing in the Last Year: Patient unable to answer       Family History:   No family history on file.    Review of Systems:   Pertinent positives stated above in HPI. All other systems were reviewed and were negative.    Physical exam:   Constitutional:  VITALS:  BP (!) 153/65   Pulse 97   Temp 98.7 °F (37.1 °C) (Oral)   Resp 18   Ht 1.829 m (6')   Wt 71.3 kg (157 lb 3 oz)   SpO2 96%   BMI 21.32 kg/m²   Gen: alert, awake, nad oriented to person   Skin: no rash, turgor decreased  Heent:  eomi, mmm  Neck: no bruits or jvd noted, thyroid normal  Cardiovascular:  S1, S2 without m/r/g tachycardic  Respiratory: CTA B without w/r/r; respiratory effort normal  Abdomen:  +bs, soft, nt, nd, no hepatosplenomegaly corado in place  Ext: no lower extremity edema  Musculoskeletal:

## 2024-06-24 NOTE — PLAN OF CARE
Problem: Discharge Planning  Goal: Discharge to home or other facility with appropriate resources  Outcome: Progressing  Flowsheets (Taken 6/24/2024 1033)  Discharge to home or other facility with appropriate resources: Identify barriers to discharge with patient and caregiver     Problem: Skin/Tissue Integrity  Goal: Absence of new skin breakdown  Description: 1.  Monitor for areas of redness and/or skin breakdown  2.  Assess vascular access sites hourly  3.  Every 4-6 hours minimum:  Change oxygen saturation probe site  4.  Every 4-6 hours:  If on nasal continuous positive airway pressure, respiratory therapy assess nares and determine need for appliance change or resting period.  Outcome: Progressing     Problem: Safety - Adult  Goal: Free from fall injury  Outcome: Progressing

## 2024-06-24 NOTE — PROGRESS NOTES
4 Eyes Skin Assessment     NAME:  Reji Kaufman  YOB: 1940  MEDICAL RECORD NUMBER:  [id number]    The patient is being assessed for  Admission    I agree that at least one RN has performed a thorough Head to Toe Skin Assessment on the patient. ALL assessment sites listed below have been assessed.      Areas assessed by both nurses:    Head, Face, Ears, Shoulders, Back, Chest, Arms, Elbows, Hands, Sacrum. Buttock, Coccyx, Ischium, Legs. Feet and Heels, Under Medical Devices , and Other          Does the Patient have a Wound? Yes wound(s) were present on assessment. LDA wound assessment was Initiated and completed by RN       Sivakumar Prevention initiated by RN: Yes  Wound Care Orders initiated by RN: Yes    Pressure Injury (Stage 3,4, Unstageable, DTI, NWPT, and Complex wounds) if present, place Wound referral order by RN under : Yes    New Ostomies, if present place, Ostomy referral order under : No     Nurse 1 eSignature: Electronically signed by Mariela Rothman RN on 6/24/24 at 9:14 AM EDT    **SHARE this note so that the co-signing nurse can place an eSignature**    Nurse 2 eSignature: Electronically signed by Matteo Gan RN on 6/24/24 at 9:24 AM EDT

## 2024-06-24 NOTE — PROGRESS NOTES
Extended Infusion B-Lactam Antibiotics   meropenem    Pharmacist Verification:   1. Review indication, allergies, suspected pathogens, drug interactions, and renal function   2. Automatically interchange intermittent infusion orders with extended infusion (unless exclusion criteria met)   3. Automatically adjust dose based on indication and renal function   4. Automatically adjust timing of antibiotics to avoid compatibility issues, if applicable     Exclusions:   1. Pediatric patients   2. Patients in derick-operative settings   3. Patients in ambulatory clinics or infusion centers   4. If a patient has restricted IV access or drug compatibility concerns, patients may receive standard intermittent infusions of antibiotics to minimize antibiotic line time and avoid drug incompatibilities, following discussion between pharmacist and prescriber.     Compatibility Information: For updated compatibility information please refer to [facility] IV Compatibility database in [employer].         Intermittent Therapy Dosing Recommendations - ONLY to be utilized if Exclusion criteria met

## 2024-06-24 NOTE — PROGRESS NOTES
Pharmacy Medication Reconciliation Note     List of medications patient is currently taking is complete.    Source of information:   1. Medication list from [facility]    Notes regarding home medications:   1. Medication list up to date-added oxycodone 5mg PO Q6H PRN and benzonatate 100 mg PO TID PRN    Missy Youssef PharmD, BCPS  6/24/2024 8:35 AM

## 2024-06-24 NOTE — PROGRESS NOTES
Patient transferred to low air loss mattress at this time. Turned and repositioned. Denies pain. No other needs at this time Electronically signed by Mariela Rothman RN on 6/24/2024 at 4:38 PM

## 2024-06-24 NOTE — H&P
V2.0  History and Physical      Name:  Reji Kaufman /Age/Sex: 1940  (83 y.o. male)   MRN & CSN:  [id number] & [id number] Encounter Date/Time: 2024 12:43 AM EDT   [facility] PCP: Luis Felipe Jackson MD       Hospital Day: 2    Assessment and Plan:   Reji Kaufman is a 83 y.o. male with a pmh of dementia who lives at a nursing home presenting with 1 week history of increasing confusion above his baseline who presents with Severe sepsis (HCC) from UTI; indwelling Rose catheter contributing.    Hospital Problems             Last Modified POA    * (Principal) Severe sepsis (HCC) 2024 Yes    EVELINA (acute kidney injury) ([facility]) 2024 Yes    Acute metabolic encephalopathy 2024 Yes    CKD stage 3b, GFR 30-44 ml/min ([facility]) 2024 Yes    HTN (hypertension) 2024 Yes    UTI (urinary tract infection) due to urinary indwelling catheter ([facility]) 2024 Yes    Abnormal liver enzymes 2024 Yes    Chronic anemia 2024 Yes    Acute hyponatremia 2024 Yes       Plan:  Patient met SIRS criteria with heart rate of more than 90.  White count of 14,800.  Urine is abnormal.  Organ dysfunction of metabolic encephalopathy above baseline.  Creatinine of 3.5.  Lactic acid of 3.4.  Lactic acid was trended.  Urine culture and blood culture was obtained in the emergency department, follow.  Received IV fluid bolus and septic shock protocol.  However patient is not hypotensive neither is lactic acid more than 4.   Trend CBC and CMP.  Review of previous records shows that patient had Klebsiella pneumonia a ESBL in the urine culture and Enterococcus faecalis in urine culture on 5/15/2024.  Klebsiella pneumoniae ESBL was sensitive to fluoroquinolones and other antibiotics.  However will continue meropenem that patient was started at the emergency department  EVELINA on CKD stage IIIb.  Baseline creatinine of 1.9.  On presentation creatinine was 3.5.  Gentle IV hydration.  Avoid  3.5*   GLUCOSE 94     Hepatic:   Recent Labs     06/23/24 2046   AST 59*   ALT 9*   BILITOT <0.2   ALKPHOS 122     Lipids: No results found for: \"CHOL\", \"HDL\", \"TRIG\"  Hemoglobin A1C: No results found for: \"LABA1C\"  TSH:   Lab Results   Component Value Date/Time    TSH 1.29 04/11/2024 08:55 AM     Troponin: No results found for: \"TROPONINT\"  Lactic Acid:   Recent Labs     06/23/24 2046 06/23/24  2355   LACTA 3.4* 2.3*     BNP: No results for input(s): \"PROBNP\" in the last 72 hours.  UA:  Lab Results   Component Value Date/Time    NITRU Negative 06/23/2024 10:12 PM    COLORU [place] 06/23/2024 10:12 PM    PHUR 6.0 06/23/2024 10:12 PM    PHUR 6.0 04/11/2024 08:55 AM    WBCUA 149 06/23/2024 10:12 PM    RBCUA 505 06/23/2024 10:12 PM    MUCUS 1+ 05/15/2024 12:39 AM    BACTERIA 1+ 06/23/2024 10:12 PM    CLARITYU CLOUDY 06/23/2024 10:12 PM    LEUKOCYTESUR LARGE 06/23/2024 10:12 PM    UROBILINOGEN 0.2 06/23/2024 10:12 PM    BILIRUBINUR Negative 06/23/2024 10:12 PM    BLOODU LARGE 06/23/2024 10:12 PM    GLUCOSEU Negative 06/23/2024 10:12 PM    KETUA Negative 06/23/2024 10:12 PM    AMORPHOUS 2+ 04/11/2024 08:55 AM     Urine Cultures:   Lab Results   Component Value Date/Time    LABURIN No growth at 18 to 36 hours 05/15/2024 08:13 AM     Blood Cultures:   Lab Results   Component Value Date/Time    BC No Growth after 4 days of incubation. 05/16/2024 09:32 AM     Lab Results   Component Value Date/Time    BLOODCULT2 No Growth after 4 days of incubation. 05/16/2024 09:42 AM     Organism:   Lab Results   Component Value Date/Time    ORG Klebsiella pneumoniae ESBL 05/15/2024 06:09 AM       Imaging/Diagnostics Last 24 Hours   XR CHEST (2 VW)    Result Date: 6/23/2024  EXAMINATION: TWO XRAY VIEWS OF THE CHEST 6/23/2024 8:45 pm COMPARISON: Chest radiograph 05/22/2024. HISTORY: ORDERING SYSTEM PROVIDED HISTORY: ams TECHNOLOGIST PROVIDED HISTORY: Reason for exam:->ams Reason for Exam: ams FINDINGS: The cardiomediastinal silhouette is  within normal limits.  Shallow inspiration.  Mild bibasilar opacities.  No pneumothorax, vascular congestion, consolidation, or pleural effusion is identified.  No acute osseous abnormality.     Shallow inspiration with mild bibasilar opacities likely representing atelectasis.     CT HEAD WO CONTRAST    Result Date: 6/23/2024  EXAMINATION: CT OF THE HEAD WITHOUT CONTRAST  6/23/2024 7:24 pm TECHNIQUE: CT of the head was performed without the administration of intravenous contrast. Automated exposure control, iterative reconstruction, and/or weight based adjustment of the mA/kV was utilized to reduce the radiation dose to as low as reasonably achievable. COMPARISON: 05/14/2024 HISTORY: ORDERING SYSTEM PROVIDED HISTORY: ams TECHNOLOGIST PROVIDED HISTORY: Has a \"code stroke\" or \"stroke alert\" been called?->No Reason for exam:->ams Reason for Exam: ams FINDINGS: BRAIN/VENTRICLES: Moderate stable cerebral volume loss.  Periventricular, deep, and subcortical white matter hypodensity noted consistent with stable significant small vessel disease.  No intracranial hemorrhage, midline shift, or mass effect. ORBITS: The orbits/globes appear grossly unremarkable. SINUSES: The imaged paranasal sinuses and mastoid air cells appear unremarkable. SOFT TISSUES/SKULL:  There is atherosclerotic calcification of the cavernous carotid arteries.  No displaced calvarial fracture.  Soft tissue structures at the axial level of the skull base appear grossly unremarkable.     No acute intracranial abnormality.         Electronically signed by Benito Odroñez MD on 6/24/2024 at 12:43 AM

## 2024-06-24 NOTE — CARE COORDINATION
[facility] Nurse  Consult Note       NAME:  Reji Kaufman  MEDICAL RECORD NUMBER:  [id number]  AGE: 83 y.o.   GENDER: male  : 1940  TODAY'S DATE:  2024    Subjective   Reason for WOCN Evaluation and Assessment: stage 3 buttocks, unstageable R heel, plantar      Reji Kaufman is a 83 y.o. male referred by:   [] Physician  [x] Nursing  [] Other:     Wound Identification:  Wound Type: pressure  Contributing Factors: chronic pressure, decreased mobility, and incontinence of stool    Wound History: All wounds POA. Chronic corado, hx of prostate cancer. Pt from [facility]. Frequent admissions. Unclear of baseline mobility, appears pt mostly bed bound.   Current Wound Care Treatment:  n/a    Patient Goal of Care:  [x] Wound Healing  [] Odor Control  [] Palliative Care  [] Pain Control   [] Other:         PAST MEDICAL HISTORY        Diagnosis Date    Alcohol abuse     AV block, 1st degree     Hypertension        PAST SURGICAL HISTORY    Past Surgical History:   Procedure Laterality Date    COLONOSCOPY N/A 2024    COLONOSCOPY performed by Jose Radford MD at [facility] ENDOSCOPY    UPPER GASTROINTESTINAL ENDOSCOPY N/A 2024    ESOPHAGOGASTRODUODENOSCOPY performed by Jose Radford MD at [facility] ENDOSCOPY       FAMILY HISTORY    No family history on file.    SOCIAL HISTORY    Social History     Tobacco Use    Smoking status: Some Days     Types: Cigarettes    Smokeless tobacco: Former   Substance Use Topics    Alcohol use: Not Currently    Drug use: Never       ALLERGIES    No Known Allergies    MEDICATIONS    No current facility-administered medications on file prior to encounter.     Current Outpatient Medications on File Prior to Encounter   Medication Sig Dispense Refill    oxyCODONE (ROXICODONE) 5 MG immediate release tablet Take 1 tablet by mouth every 6 hours as needed for Pain. Max Daily Amount: 20 mg      benzonatate (TESSALON) 100 MG capsule Take 1 capsule by  mouth 3 times daily as needed for Cough      bisacodyl (DULCOLAX) 10 MG suppository Place 1 suppository rectally daily as needed for Constipation      calcium carbonate (OS-PRISCILLA) 1250 (500 Ca) MG chewable tablet Take 1 tablet by mouth every 4 hours as needed for Heartburn      Cranberry 500 MG CAPS Take 1 capsule by mouth daily      ergocalciferol (ERGOCALCIFEROL) 1.25 MG (16892 UT) capsule Take 1 capsule by mouth once a week On Friday      finasteride (PROSCAR) 5 MG tablet Take 1 tablet by mouth daily      hydrALAZINE (APRESOLINE) 10 MG tablet Take 1 tablet by mouth in the morning and at bedtime      lidocaine 4 % GEL jelly Apply topically daily as needed for Pain (lower back)      melatonin 3 MG TABS tablet Take 2 tablets by mouth nightly      mirtazapine (REMERON) 7.5 MG tablet Take 1 tablet by mouth nightly      NIFEdipine (ADALAT CC) 30 MG extended release tablet Take 1 tablet by mouth daily      polyethylene glycol (GLYCOLAX) 17 g packet Take 1 packet by mouth daily as needed for Constipation      senna (SENOKOT) 8.6 MG tablet Take 1 tablet by mouth nightly      tamsulosin (FLOMAX) 0.4 MG capsule Take 1 capsule by mouth daily      acetaminophen (TYLENOL) 325 MG tablet Take 2 tablets by mouth every 6 hours as needed for Pain      vitamin B-12 (CYANOCOBALAMIN) 500 MCG tablet Take 1 tablet by mouth daily      metoprolol tartrate (LOPRESSOR) 25 MG tablet Take 1 tablet by mouth 2 times daily      fluticasone (FLONASE) 50 MCG/ACT nasal spray 1 spray by Each Nostril route daily         Objective    BP (!) 153/65   Pulse 97   Temp 98.7 °F (37.1 °C) (Oral)   Resp 18   Ht 1.829 m (6')   Wt 71.3 kg (157 lb 3 oz)   SpO2 96%   BMI 21.32 kg/m²     LABS:  WBC:    Lab Results   Component Value Date/Time    WBC 14.7 06/24/2024 05:30 AM     H/H:    Lab Results   Component Value Date/Time    HGB 7.3 06/24/2024 05:30 AM    HCT 22.5 06/24/2024 05:30 AM     PTT:  No results found for: \"APTT\"[APTT}  PT/INR:  No results found

## 2024-06-24 NOTE — ED TRIAGE NOTES
Pt family states pt is not acting right and may have a UTI. Dementia at baseline with a chronic corado.

## 2024-06-24 NOTE — PROGRESS NOTES
Call to Yasir, patients son to update on new room number of 4120. Update provided. All questions answered.

## 2024-06-25 PROBLEM — C61 PROSTATE CANCER METASTATIC TO BONE (HCC): Status: ACTIVE | Noted: 2024-06-25

## 2024-06-25 PROBLEM — N17.9 ACUTE KIDNEY INJURY SUPERIMPOSED ON CKD (HCC): Status: ACTIVE | Noted: 2024-06-25

## 2024-06-25 PROBLEM — C79.51 PROSTATE CANCER METASTATIC TO BONE (HCC): Status: ACTIVE | Noted: 2024-06-25

## 2024-06-25 PROBLEM — N18.9 ACUTE KIDNEY INJURY SUPERIMPOSED ON CKD (HCC): Status: ACTIVE | Noted: 2024-06-25

## 2024-06-25 LAB
ABO + RH BLD: NORMAL
ALBUMIN SERPL-MCNC: 2.5 G/DL (ref 3.4–5)
ALBUMIN/GLOB SERPL: 0.6 {RATIO} (ref 1.1–2.2)
ALP SERPL-CCNC: 92 U/L (ref 40–129)
ALT SERPL-CCNC: <5 U/L (ref 10–40)
ANION GAP SERPL CALCULATED.3IONS-SCNC: 14 MMOL/L (ref 3–16)
ANION GAP SERPL CALCULATED.3IONS-SCNC: 17 MMOL/L (ref 3–16)
AST SERPL-CCNC: 25 U/L (ref 15–37)
BACTERIA UR CULT: NORMAL
BASOPHILS # BLD: 0.2 K/UL (ref 0–0.2)
BASOPHILS NFR BLD: 1.7 %
BILIRUB SERPL-MCNC: <0.2 MG/DL (ref 0–1)
BLD GP AB SCN SERPL QL: NORMAL
BLOOD BANK DISPENSE STATUS: NORMAL
BLOOD BANK PRODUCT CODE: NORMAL
BPU ID: NORMAL
BUN SERPL-MCNC: 56 MG/DL (ref 7–20)
BUN SERPL-MCNC: 58 MG/DL (ref 7–20)
CALCIUM SERPL-MCNC: 9.2 MG/DL (ref 8.3–10.6)
CALCIUM SERPL-MCNC: 9.2 MG/DL (ref 8.3–10.6)
CHLORIDE SERPL-SCNC: 103 MMOL/L (ref 99–110)
CHLORIDE SERPL-SCNC: 105 MMOL/L (ref 99–110)
CO2 SERPL-SCNC: 16 MMOL/L (ref 21–32)
CO2 SERPL-SCNC: 17 MMOL/L (ref 21–32)
CREAT SERPL-MCNC: 3.6 MG/DL (ref 0.8–1.3)
CREAT SERPL-MCNC: 3.7 MG/DL (ref 0.8–1.3)
DEPRECATED RDW RBC AUTO: 19.6 % (ref 12.4–15.4)
DEPRECATED RDW RBC AUTO: 19.7 % (ref 12.4–15.4)
DESCRIPTION BLOOD BANK: NORMAL
EOSINOPHIL # BLD: 0.1 K/UL (ref 0–0.6)
EOSINOPHIL NFR BLD: 0.4 %
FERRITIN SERPL IA-MCNC: 1814 NG/ML (ref 30–400)
GFR SERPLBLD CREATININE-BSD FMLA CKD-EPI: 15 ML/MIN/{1.73_M2}
GFR SERPLBLD CREATININE-BSD FMLA CKD-EPI: 16 ML/MIN/{1.73_M2}
GLUCOSE SERPL-MCNC: 100 MG/DL (ref 70–99)
GLUCOSE SERPL-MCNC: 128 MG/DL (ref 70–99)
HCT VFR BLD AUTO: 21.2 % (ref 40.5–52.5)
HCT VFR BLD AUTO: 25.2 % (ref 40.5–52.5)
HCT VFR BLD AUTO: 26.1 % (ref 40.5–52.5)
HGB BLD-MCNC: 6.8 G/DL (ref 13.5–17.5)
HGB BLD-MCNC: 8.3 G/DL (ref 13.5–17.5)
HGB BLD-MCNC: 8.7 G/DL (ref 13.5–17.5)
IRON SATN MFR SERPL: 17 % (ref 20–50)
IRON SERPL-MCNC: 21 UG/DL (ref 59–158)
LYMPHOCYTES # BLD: 0.9 K/UL (ref 1–5.1)
LYMPHOCYTES NFR BLD: 6.6 %
MAGNESIUM SERPL-MCNC: 1.7 MG/DL (ref 1.8–2.4)
MCH RBC QN AUTO: 24.4 PG (ref 26–34)
MCH RBC QN AUTO: 25.8 PG (ref 26–34)
MCHC RBC AUTO-ENTMCNC: 32 G/DL (ref 31–36)
MCHC RBC AUTO-ENTMCNC: 33.2 G/DL (ref 31–36)
MCV RBC AUTO: 76.4 FL (ref 80–100)
MCV RBC AUTO: 77.8 FL (ref 80–100)
MONOCYTES # BLD: 1 K/UL (ref 0–1.3)
MONOCYTES NFR BLD: 7.3 %
NEUTROPHILS # BLD: 11.5 K/UL (ref 1.7–7.7)
NEUTROPHILS NFR BLD: 84 %
PHOSPHATE SERPL-MCNC: 3.8 MG/DL (ref 2.5–4.9)
PLATELET # BLD AUTO: 163 K/UL (ref 135–450)
PLATELET # BLD AUTO: 234 K/UL (ref 135–450)
PMV BLD AUTO: 8.2 FL (ref 5–10.5)
PMV BLD AUTO: 8.8 FL (ref 5–10.5)
POTASSIUM SERPL-SCNC: 4.3 MMOL/L (ref 3.5–5.1)
POTASSIUM SERPL-SCNC: 4.7 MMOL/L (ref 3.5–5.1)
PROT SERPL-MCNC: 6.6 G/DL (ref 6.4–8.2)
RBC # BLD AUTO: 2.77 M/UL (ref 4.2–5.9)
RBC # BLD AUTO: 3.23 M/UL (ref 4.2–5.9)
SODIUM SERPL-SCNC: 136 MMOL/L (ref 136–145)
SODIUM SERPL-SCNC: 136 MMOL/L (ref 136–145)
TIBC SERPL-MCNC: 127 UG/DL (ref 260–445)
TSH SERPL DL<=0.005 MIU/L-ACNC: 0.72 UIU/ML (ref 0.27–4.2)
WBC # BLD AUTO: 13.6 K/UL (ref 4–11)
WBC # BLD AUTO: 13.7 K/UL (ref 4–11)

## 2024-06-25 PROCEDURE — 86923 COMPATIBILITY TEST ELECTRIC: CPT

## 2024-06-25 PROCEDURE — 2580000003 HC RX 258: Performed by: INTERNAL MEDICINE

## 2024-06-25 PROCEDURE — 1200000000 HC SEMI PRIVATE

## 2024-06-25 PROCEDURE — 85018 HEMOGLOBIN: CPT

## 2024-06-25 PROCEDURE — 30233N1 TRANSFUSION OF NONAUTOLOGOUS RED BLOOD CELLS INTO PERIPHERAL VEIN, PERCUTANEOUS APPROACH: ICD-10-PCS | Performed by: HOSPITALIST

## 2024-06-25 PROCEDURE — 84100 ASSAY OF PHOSPHORUS: CPT

## 2024-06-25 PROCEDURE — 36415 COLL VENOUS BLD VENIPUNCTURE: CPT

## 2024-06-25 PROCEDURE — 2580000003 HC RX 258: Performed by: HOSPITALIST

## 2024-06-25 PROCEDURE — 83735 ASSAY OF MAGNESIUM: CPT

## 2024-06-25 PROCEDURE — 6370000000 HC RX 637 (ALT 250 FOR IP): Performed by: HOSPITALIST

## 2024-06-25 PROCEDURE — 82728 ASSAY OF FERRITIN: CPT

## 2024-06-25 PROCEDURE — 83550 IRON BINDING TEST: CPT

## 2024-06-25 PROCEDURE — 84443 ASSAY THYROID STIM HORMONE: CPT

## 2024-06-25 PROCEDURE — 85025 COMPLETE CBC W/AUTO DIFF WBC: CPT

## 2024-06-25 PROCEDURE — 85014 HEMATOCRIT: CPT

## 2024-06-25 PROCEDURE — 99233 SBSQ HOSP IP/OBS HIGH 50: CPT | Performed by: INTERNAL MEDICINE

## 2024-06-25 PROCEDURE — P9016 RBC LEUKOCYTES REDUCED: HCPCS

## 2024-06-25 PROCEDURE — 6360000002 HC RX W HCPCS: Performed by: HOSPITALIST

## 2024-06-25 PROCEDURE — 83540 ASSAY OF IRON: CPT

## 2024-06-25 PROCEDURE — 85027 COMPLETE CBC AUTOMATED: CPT

## 2024-06-25 PROCEDURE — 86850 RBC ANTIBODY SCREEN: CPT

## 2024-06-25 PROCEDURE — 86900 BLOOD TYPING SEROLOGIC ABO: CPT

## 2024-06-25 PROCEDURE — 80053 COMPREHEN METABOLIC PANEL: CPT

## 2024-06-25 PROCEDURE — 2500000003 HC RX 250 WO HCPCS: Performed by: REGISTERED NURSE

## 2024-06-25 PROCEDURE — 6370000000 HC RX 637 (ALT 250 FOR IP): Performed by: REGISTERED NURSE

## 2024-06-25 PROCEDURE — 86901 BLOOD TYPING SEROLOGIC RH(D): CPT

## 2024-06-25 PROCEDURE — 2500000003 HC RX 250 WO HCPCS: Performed by: INTERNAL MEDICINE

## 2024-06-25 PROCEDURE — 36430 TRANSFUSION BLD/BLD COMPNT: CPT

## 2024-06-25 RX ORDER — OXYCODONE AND ACETAMINOPHEN 10; 325 MG/1; MG/1
1 TABLET ORAL EVERY 4 HOURS PRN
Status: ACTIVE | OUTPATIENT
Start: 2024-06-25 | End: 2024-06-26

## 2024-06-25 RX ORDER — OXYCODONE HYDROCHLORIDE AND ACETAMINOPHEN 5; 325 MG/1; MG/1
1 TABLET ORAL EVERY 4 HOURS PRN
Status: DISPENSED | OUTPATIENT
Start: 2024-06-25 | End: 2024-06-26

## 2024-06-25 RX ORDER — METOPROLOL TARTRATE 1 MG/ML
5 INJECTION, SOLUTION INTRAVENOUS ONCE
Status: COMPLETED | OUTPATIENT
Start: 2024-06-25 | End: 2024-06-25

## 2024-06-25 RX ORDER — SODIUM CHLORIDE 9 MG/ML
INJECTION, SOLUTION INTRAVENOUS PRN
Status: COMPLETED | OUTPATIENT
Start: 2024-06-25 | End: 2024-06-27

## 2024-06-25 RX ADMIN — Medication: at 16:54

## 2024-06-25 RX ADMIN — MIRTAZAPINE 7.5 MG: 15 TABLET, FILM COATED ORAL at 20:28

## 2024-06-25 RX ADMIN — FINASTERIDE 5 MG: 5 TABLET, FILM COATED ORAL at 08:25

## 2024-06-25 RX ADMIN — OXYCODONE HYDROCHLORIDE AND ACETAMINOPHEN 1 TABLET: 5; 325 TABLET ORAL at 23:55

## 2024-06-25 RX ADMIN — ACETAMINOPHEN 325MG 650 MG: 325 TABLET ORAL at 20:28

## 2024-06-25 RX ADMIN — MEROPENEM 500 MG: 500 INJECTION, POWDER, FOR SOLUTION INTRAVENOUS at 08:23

## 2024-06-25 RX ADMIN — FLUTICASONE PROPIONATE 1 SPRAY: 50 SPRAY, METERED NASAL at 08:23

## 2024-06-25 RX ADMIN — METOPROLOL TARTRATE 25 MG: 25 TABLET, FILM COATED ORAL at 08:24

## 2024-06-25 RX ADMIN — NIFEDIPINE 30 MG: 30 TABLET, EXTENDED RELEASE ORAL at 08:24

## 2024-06-25 RX ADMIN — SODIUM CHLORIDE, PRESERVATIVE FREE 10 ML: 5 INJECTION INTRAVENOUS at 20:37

## 2024-06-25 RX ADMIN — METOPROLOL TARTRATE 25 MG: 25 TABLET, FILM COATED ORAL at 20:28

## 2024-06-25 RX ADMIN — TAMSULOSIN HYDROCHLORIDE 0.4 MG: 0.4 CAPSULE ORAL at 08:24

## 2024-06-25 RX ADMIN — HYDRALAZINE HYDROCHLORIDE 10 MG: 10 TABLET, FILM COATED ORAL at 20:28

## 2024-06-25 RX ADMIN — MEROPENEM 500 MG: 500 INJECTION, POWDER, FOR SOLUTION INTRAVENOUS at 22:47

## 2024-06-25 RX ADMIN — SENNOSIDES 8.6 MG: 8.6 TABLET, FILM COATED ORAL at 20:28

## 2024-06-25 RX ADMIN — METOPROLOL TARTRATE 5 MG: 1 INJECTION, SOLUTION INTRAVENOUS at 22:31

## 2024-06-25 RX ADMIN — SODIUM CHLORIDE, PRESERVATIVE FREE 10 ML: 5 INJECTION INTRAVENOUS at 08:24

## 2024-06-25 RX ADMIN — HYDRALAZINE HYDROCHLORIDE 10 MG: 10 TABLET, FILM COATED ORAL at 08:24

## 2024-06-25 RX ADMIN — ENOXAPARIN SODIUM 30 MG: 100 INJECTION SUBCUTANEOUS at 08:23

## 2024-06-25 RX ADMIN — SODIUM CHLORIDE: 9 INJECTION, SOLUTION INTRAVENOUS at 22:45

## 2024-06-25 RX ADMIN — Medication 6 MG: at 20:28

## 2024-06-25 ASSESSMENT — PAIN SCALES - WONG BAKER
WONGBAKER_NUMERICALRESPONSE: HURTS EVEN MORE

## 2024-06-25 ASSESSMENT — PAIN DESCRIPTION - PAIN TYPE: TYPE: ACUTE PAIN

## 2024-06-25 ASSESSMENT — PAIN DESCRIPTION - LOCATION: LOCATION: GENERALIZED;KNEE

## 2024-06-25 ASSESSMENT — PAIN SCALES - GENERAL: PAINLEVEL_OUTOF10: 0

## 2024-06-25 ASSESSMENT — PAIN DESCRIPTION - DESCRIPTORS: DESCRIPTORS: ACHING;THROBBING;TENDER

## 2024-06-25 ASSESSMENT — PAIN DESCRIPTION - ORIENTATION: ORIENTATION: RIGHT

## 2024-06-25 NOTE — PROGRESS NOTES
Hematology called this nurse with critical hemoglobin value of 6.8. Dr. De La Garza notified via perfect serve. Read at 1046. Electronically signed by Mariela Rothman RN on 6/25/2024 at 10:56 AM     See new orders. Electronically signed by Mariela Rothman RN on 6/25/2024 at 11:06 AM     Son Sanjay Kaufman called for consent of blood transfusion. Second nurse witness to consent. Form signed by two RN s and placed in chart. Electronically signed by Mariela Rothman RN on 6/25/2024 at 11:10 AM     Blood transfusion started at this time. VSS on room air. Afebrile. Denies pain. Blood verified with blood bank and two RN. Started at 60 mL/hr. This RN with patient for first 15 minutes without complication. Rate increased to 125 mL/hr. VSS on room air. In bed in locked and lowest position with bed alarm on. Call light within reach. No other needs at this time. Electronically signed by Mariela Rothman RN on 6/25/2024 at 2:02 PM     Blood transfusion complete at this time. VSS on room air. Granddaughter at bedside updated on status. IV fluids infusing as ordered. Patient turned. Bed alarm on. Call light within reach. No other needs at this time. Electronically signed by Mariela Rothman RN on 6/25/2024 at 4:56 PM

## 2024-06-25 NOTE — PROGRESS NOTES
V2.0  [facility] Hospitalist Progress Note      Name:  Reji Kaufman /Age/Sex: 1940  (83 y.o. male)   MRN & CSN:  [id number] & [id number] Encounter Date/Time: 2024 2:35 PM EDT    Location:  [place] [address]- PCP: Luis Felipe Jackson MD       Hospital Day: 3      Subjective:     Chief Complaint:  Altered Mental Status (Pt family states pt is not acting right and may have a UTI. Dementia at baseline with a chronic corado. )     Patient seen and examined at bedside. Alert, oriented x3.   Hgb dropped 6.8; no sign of bleeding.  1u pRBC ordered.   Pt has no new complaints or concerns.        Assessment and Plan:   Severe sepsis. Initially suspected 2/2 UTI. P/w leukocytosis, tachycardia. LA 3.4.   UA suggestive of infection but urine cx negative. Blood cx negative so far.   Recent prior hx ESBL   Has been on merrem  Leukocytosis not resolving still but slightly improved 13.7 from 14.7 yesterday.   ID managing.     acute metabolic encephalopathy. Likely 2/2 sepsis.   Now coming back to baseline.     EVELINA on CKD. Baseline Cr 1.9 came w 3.5. still not resolving despite IVF  Has corado  - nephro managing  - on IVF  - avoid nephrotoxins    Essential HTN. BP stable. Resume home meds    Acute on chronic anemia.  Suspect anemia of chronic disease given CKD  Hgb 6.8 today. No sign of bleeding  Check iron panel, ferritin  Monitor CBC daily    Personally reviewed Lab Studies and Imaging     Drugs that require monitoring for toxicity include merrem and the method of monitoring was cbc      Diet ADULT DIET; Dysphagia - Soft and Bite Sized; Low Sodium (2 gm)  ADULT ORAL NUTRITION SUPPLEMENT; Breakfast, Lunch, Dinner; Standard High Calorie/High Protein Oral Supplement  ADULT ORAL NUTRITION SUPPLEMENT; Dinner; Wound Healing Oral Supplement   DVT Prophylaxis [] Lovenox, []  Heparin, [x] SCDs, [] Ambulation,  [] Eliquis, [] Xarelto  [] Coumadin   Code Status Full Code   Disposition From: ECF  Expected Disposition: ECF  Estimated Date of  - 34.0 pg    MCHC 32.0 31.0 - 36.0 g/dL    RDW 19.6 (H) 12.4 - 15.4 %    Platelets 163 135 - 450 K/uL    MPV 8.8 5.0 - 10.5 fL    Neutrophils % 84.0 %    Lymphocytes % 6.6 %    Monocytes % 7.3 %    Eosinophils % 0.4 %    Basophils % 1.7 %    Neutrophils Absolute 11.5 (H) 1.7 - 7.7 K/uL    Lymphocytes Absolute 0.9 (L) 1.0 - 5.1 K/uL    Monocytes Absolute 1.0 0.0 - 1.3 K/uL    Eosinophils Absolute 0.1 0.0 - 0.6 K/uL    Basophils Absolute 0.2 0.0 - 0.2 K/uL   Basic Metabolic Panel    Collection Time: 06/25/24  9:41 AM   Result Value Ref Range    Sodium 136 136 - 145 mmol/L    Potassium 4.7 3.5 - 5.1 mmol/L    Chloride 105 99 - 110 mmol/L    CO2 17 (L) 21 - 32 mmol/L    Anion Gap 14 3 - 16    Glucose 100 (H) 70 - 99 mg/dL    BUN 58 (H) 7 - 20 mg/dL    Creatinine 3.7 (H) 0.8 - 1.3 mg/dL    Est, Glom Filt Rate 15 (A) >60    Calcium 9.2 8.3 - 10.6 mg/dL   TYPE AND SCREEN    Collection Time: 06/25/24 11:34 AM   Result Value Ref Range    ABO/Rh B POS     Antibody Screen NEG    PREPARE RBC (CROSSMATCH), 1 Units    Collection Time: 06/25/24 11:34 AM   Result Value Ref Range    Product Code Blood Bank [id number]     Description Blood Bank Red Blood Cells, Leuko-reduced     Unit Number [id number]     Dispense Status Blood Bank transfused         Imaging/Diagnostics Last 24 Hours   XR CHEST (2 VW)    Result Date: 6/23/2024  EXAMINATION: TWO XRAY VIEWS OF THE CHEST 6/23/2024 8:45 pm COMPARISON: Chest radiograph 05/22/2024. HISTORY: ORDERING SYSTEM PROVIDED HISTORY: ams TECHNOLOGIST PROVIDED HISTORY: Reason for exam:->ams Reason for Exam: ams FINDINGS: The cardiomediastinal silhouette is within normal limits.  Shallow inspiration.  Mild bibasilar opacities.  No pneumothorax, vascular congestion, consolidation, or pleural effusion is identified.  No acute osseous abnormality.     Shallow inspiration with mild bibasilar opacities likely representing atelectasis.     CT HEAD WO CONTRAST    Result Date: 6/23/2024  EXAMINATION: CT OF

## 2024-06-25 NOTE — DISCHARGE INSTR - COC
Continuity of Care Form    Patient Name: Reji Kaufman   :  1940  MRN:  [id number]    Admit date:  2024  Discharge date:  24    Code Status Order: Full Code   Advance Directives:     Admitting Physician:  Benito Ordoñez MD  PCP: Luis Felipe Jackson MD    Discharging Nurse: KLAUDIA Mathews RN  Discharging Hospital Unit/Room#: H4R-3268/4120-01  Discharging Unit Phone Number: [phone]    Emergency Contact:   Extended Emergency Contact Information  Primary Emergency Contact: Sanjay Kaufman  Address: [address] [place] [address]  Home Phone: [phone]  Mobile Phone: [phone]  Relation: Child  Secondary Emergency Contact: Kenn Kaufman  Home Phone: [phone]  Mobile Phone: [phone]  Relation: Child    Past Surgical History:  Past Surgical History:   Procedure Laterality Date    COLONOSCOPY N/A 2024    COLONOSCOPY performed by Jose Radford MD at [facility] ENDOSCOPY    UPPER GASTROINTESTINAL ENDOSCOPY N/A 2024    ESOPHAGOGASTRODUODENOSCOPY performed by Jose Radford MD at [facility] ENDOSCOPY       Immunization History:     There is no immunization history on file for this patient.    Active Problems:  Patient Active Problem List   Diagnosis Code    EVELINA (acute kidney injury) ([facility]) N17.9    Altered mental status R41.82    Hypernatremia E87.0    Septicemia ([facility]) A41.9    ESBL (extended spectrum beta-lactamase) producing bacteria infection A49.9, Z16.12    Metastatic adenocarcinoma to prostate ([facility]) C79.82    Lactic acid acidosis E87.20    Sepsis with acute renal failure and tubular necrosis without septic shock (HCC) A41.9, R65.20, N17.0    Severe sepsis (HCC) A41.9, R65.20    Acute metabolic encephalopathy G93.41    CKD stage 3b, GFR 30-44 ml/min (HCC) N18.32    HTN (hypertension) I10    UTI (urinary tract infection) due to urinary indwelling catheter ([facility]) T83.511A, N39.0    Abnormal liver enzymes R74.8    Chronic anemia D64.9    Acute hyponatremia E87.1

## 2024-06-25 NOTE — PROGRESS NOTES
[facility]  Phone: [phone]  Fax: [phone]  [employer]         Reason for Consult: EVELINA on CKD    Requesting Physician:  Dr. Fields    History Obtained From: electronic medical record    History of Present Ilness: 83-year-old -American gentleman who was sent from the nursing home with increasing confusion  History obtained from the medical records as patient is unable to provide any reliable information he has history of EVELINA in May 2024 in the setting of sepsis and urinary tract infection his creatinine improved to 1.8 mg at the time of discharge from a peak of 2.8 mg  He has history of metastatic prostate cancer hypertension dementia and chronic indwelling Corado catheter his previous cultures have grown ESBL Klebsiella in the urine and Enterococcus faecalis as well in May 2024  On presentation is noted to have a creatinine of 3.5 mg  He was hypertensive and tachycardic with a temperature of 100.2 degrees  Patient was started on meropenem and vancomycin  We are asked to see him for his EVELINA on CKD          Review of Systems: HGB down to 6.8 gm   Awake answering simple questions Denies abd pain nausea SOB   UOP 1100 ml/24      Physical exam:   Constitutional:  VITALS:  BP (!) 147/58   Pulse 90   Temp 98.5 °F (36.9 °C) (Oral)   Resp 18   Ht 1.829 m (6')   Wt 71 kg (156 lb 8.4 oz)   SpO2 99%   BMI 21.23 kg/m²   Gen: alert, awake, nad oriented to person  and city  Skin: no rash, turgor decreased  Heent:  eomi, mmm  Neck: no bruits or jvd noted, thyroid normal  Cardiovascular:  S1, S2 without m/r/g tachycardic  Respiratory: CTA B without w/r/r; respiratory effort normal  Abdomen:  +bs, soft, nt, nd, corado in place  Ext: no lower extremity edema    Data/  CBC:   Lab Results   Component Value Date/Time    WBC 13.7 06/25/2024 09:41 AM    RBC 2.77 06/25/2024 09:41 AM    HGB 6.8 06/25/2024 09:41 AM    HCT 21.2 06/25/2024 09:41 AM    MCV 76.4 06/25/2024 09:41 AM    MCH 24.4

## 2024-06-25 NOTE — PROGRESS NOTES
Nutrition Note    RECOMMENDATIONS  PO Diet: Modify diet to MARGO diet.   ONS: Continue the current ONS      ASSESSMENT   Pt triggered nutrition assessment for multiple pressure injuries. Pt presents with Severe sepsis from UTI. Rose catheter placement as well as a diagnosed metastatic prostate cancer. Wt hx review indicates a trend of declining wt with fluctuations. 18lb (10.3%) wt loss x 1 month. Currently on dys soft and bite sized, 2g Na diet, po intake of meal is 50% on avg per pt, 25-50% per documentation. Ensure high calorie HP had ordered TID and Aureliano had ordered one time per day, accepting 51-75% per documentation. Will modify diet to MARGO diet to promote eating. Will cont to follow.       Malnutrition Status: Mild malnutrition  Acute Illness  Findings of the 6 clinical characteristics of malnutrition:  Energy Intake:  Mild decrease in energy intake (Comment)  Weight Loss:  5% over 1 month     Body Fat Loss:  No significant body fat loss     Muscle Mass Loss:  Mild muscle mass loss Temples (temporalis), Clavicles (pectoralis & deltoids)  Fluid Accumulation:  No significant fluid accumulation     Strength:  Not Performed      NUTRITION DIAGNOSIS   Inadequate oral intake related to inadequate protein-energy intake as evidenced by intake 26-50%  Increased nutrient needs related to increase demand for energy/nutrients as evidenced by wounds    Goals: by next RD assessment, PO intake 50% or greater     NUTRITION RELATED FINDINGS  Objective: Na+ 137, K+ 4.7, BG 89 . No edema noted. LBM 6/25.  Wounds: Pressure Injury, Multiple, Unstageable, Stage III    CURRENT NUTRITION THERAPIES  ADULT DIET; Dysphagia - Soft and Bite Sized; Low Sodium (2 gm)  ADULT ORAL NUTRITION SUPPLEMENT; Breakfast, Lunch, Dinner; Standard High Calorie/High Protein Oral Supplement  ADULT ORAL NUTRITION SUPPLEMENT; Dinner; Wound Healing Oral Supplement     PO Intake: 26-50% (50% on avg)   PO Supplement  Intake:51-75%    ANTHROPOMETRICS  Current Height: 182.9 cm (6' 0.01\")  Current Weight - Scale: 71 kg (156 lb 8.4 oz)    Ideal Body Weight (IBW): 178 lbs  (81 kg)        BMI: 21.2      COMPARATIVE STANDARDS  Total Energy Requirements (kcals/day): 1988-2272     Protein (g):  71-99       Fluid (mL/day):  1988-2272    EDUCATION  Education not indicated     The patient will be monitored per nutrition standards of care. Consult dietitian if additional nutrition interventions are needed prior to RD reassessment.     Bernabe Courtney RD    Contact: [phone]

## 2024-06-25 NOTE — PLAN OF CARE
Problem: Discharge Planning  Goal: Discharge to home or other facility with appropriate resources  Outcome: Progressing  Flowsheets (Taken 6/25/2024 2164)  Discharge to home or other facility with appropriate resources: Identify barriers to discharge with patient and caregiver     Problem: Skin/Tissue Integrity  Goal: Absence of new skin breakdown  Description: 1.  Monitor for areas of redness and/or skin breakdown  2.  Assess vascular access sites hourly  3.  Every 4-6 hours minimum:  Change oxygen saturation probe site  4.  Every 4-6 hours:  If on nasal continuous positive airway pressure, respiratory therapy assess nares and determine need for appliance change or resting period.  Outcome: Progressing     Problem: Safety - Adult  Goal: Free from fall injury  Outcome: Progressing

## 2024-06-25 NOTE — CARE COORDINATION
Case Management Assessment  Initial Evaluation    Date/Time of Evaluation: 6/25/2024 3:37 PM  Assessment Completed by: Tracy Garcia RN    If patient is discharged prior to next notation, then this note serves as note for discharge by case management.    Patient Name: Reji Kaufman                   YOB: 1940  Diagnosis: Septicemia ([facility]) [A41.9]  Severe sepsis (HCC) [A41.9, R65.20]  Altered mental status, unspecified altered mental status type [R41.82]  Urinary tract infection associated with indwelling urethral catheter, initial encounter ([facility]) [T83.511A, N39.0]                   Date / Time: 6/23/2024  8:07 PM    Patient Admission Status: Inpatient   Readmission Risk (Low < 19, Mod (19-27), High > 27): Readmission Risk Score: 26.3    Current PCP: Luis Felipe Jackson MD  PCP verified by CM? (P) Yes    Chart Reviewed: Yes      History Provided by: (P) Child/Family  Patient Orientation: (P) Other (see comment) (confused)    Patient Cognition: (P) Dementia / Early Alzheimer's    Hospitalization in the last 30 days (Readmission):  No    If yes, Readmission Assessment in  Navigator will be completed.    Advance Directives:      Code Status: Full Code   Patient's Primary Decision Maker is: (P) Legal Next of Kin    Primary Decision Maker: Sanjay Kaufman - Child - [phone]    Secondary Decision Maker: Kenn Kaufman - Child - [phone]    Discharge Planning:    Patient lives with: (P) Other (Comment) (LTC) Type of Home: (P) Long-Term Care  Primary Care Giver: (P) Other (Comment) ([facility])  Patient Support Systems include: (P) Children, Family Members   Current Financial resources: (P) Medicare, Medicaid  Current community resources: (P) ECF/Home Care  Current services prior to admission: (P) Extended Care Facility            Current DME:              Type of Home Care services:  (P) None    ADLS  Prior functional level: (P) Assistance with the following:, Bathing, Dressing, Toileting,  N39.0]    IF APPLICABLE: The Patient and/or patient representative Reji and his family were provided with a choice of provider and agrees with the discharge plan. Freedom of choice list with basic dialogue that supports the patient's individualized plan of care/goals and shares the quality data associated with the providers was provided to: (P) Patient Representative   Patient Representative Name: (P) Sanjay Kaufman     The Patient and/or Patient Representative Agree with the Discharge Plan? (P) Yes    Tracy Garcia RN  Case Management Department  Ph: [phone]      06/25/24 1525   Service Assessment   Patient Orientation Other (see comment)  (confused)   Cognition Dementia / Early Alzheimer's   History Provided By Child/Family   Primary Caregiver Other (Comment)  ([facility])   Accompanied By/Relationship no one   Support Systems Children;Family Members   Patient's Healthcare Decision Maker is: Legal Next of Kin   PCP Verified by CM Yes   Last Visit to PCP Within last 3 months   Prior Functional Level Assistance with the following:;Bathing;Dressing;Toileting;Cooking;Housework;Shopping;Mobility   Current Functional Level Assistance with the following:;Bathing;Dressing;Toileting;Cooking;Housework;Shopping;Mobility   Can patient return to prior living arrangement Yes   Ability to make needs known: Poor   Family able to assist with home care needs: No   Would you like for me to discuss the discharge plan with any other family members/significant others, and if so, who? Yes  (son)   Financial Resources Medicare;Medicaid   Community Resources ECF/Home Care   CM/SW Referral Other (see comment)  (from LTC at [facility])   Discharge Planning   Type of Residence Long-Term Care   Living Arrangements Other (Comment)  (LTC)   Current Services Prior To Admission Extended Care Facility   Potential Assistance Needed Extended Care Facility;Skilled Nursing Facility   DME Ordered? No   Potential Assistance Purchasing

## 2024-06-25 NOTE — CONSENT
Informed Consent for Blood Component Transfusion Note    I have discussed with the patient the rationale for blood component transfusion; its benefits in treating or preventing fatigue, organ damage, or death; and its risk which includes mild transfusion reactions, rare risk of blood borne infection, or more serious but rare reactions. I have discussed the alternatives to transfusion, including the risk and consequences of not receiving transfusion. The patient had an opportunity to ask questions and had agreed to proceed with transfusion of blood components.    Electronically signed by Dipak De La Garza MD on 6/25/24 at 12:58 PM EDT

## 2024-06-25 NOTE — PROGRESS NOTES
Patient given bed bath at this time. Linens changed and new gown applied. Small BM noted. Incontience care provided with barrier wipes. Triad cream applied. Skin prep applied to R foot wounds. BL boots applied. Patient repositioned and turned to L side. In speciality bed in locked and lowest position with bed alarm on. Call light and bedside table within reach. No other needs at this time.

## 2024-06-25 NOTE — PROGRESS NOTES
Infectious Diseases Inpatient Progress Note      CHIEF COMPLAINT:     Change in Mentation  ESBL UTI  Rose  EVELINA ON CKD  Wbc ELEVATION     HISTORY OF PRESENT ILLNESS:  83 y.o. old man with history of dementia admitted from nursing facility secondary  increasing confusion. Patient has a chronic Rose catheter in place for obstructive symptoms secondary to prostate issues. He was diagnosed to have urinary retention now has long term  Rose catheter placement as well as a diagnosed metastatic prostate cancer. He is following with oncology [facility] was recently started on Lupron injection. He was recently admitted to [facility] with sepsis secondary to Rose catheter associated urinary tract infection Klebsiella ESBL bacteremia.  He finished a long course of IV antibiotics at the nursing facility and his PICC line has been removed.  On admission he was found to have fever Tmax 100.2 WBC elevated creatinine 3.5 blood culture in process urine culture abnormal urine culture in process hepatitis panel negative, we are consulted for IV antibiotic recommendations    Interval history: Family is at bedside mental status improving WBC slowly improving creatinine remains elevated at 3.7, hemoglobin low at 6.7 Status post PRBC    Past Medical History:    Past Medical History:   Diagnosis Date    Alcohol abuse     AV block, 1st degree     Hypertension        Past Surgical History:    Past Surgical History:   Procedure Laterality Date    COLONOSCOPY N/A 5/21/2024    COLONOSCOPY performed by Jose Radford MD at [facility] ENDOSCOPY    UPPER GASTROINTESTINAL ENDOSCOPY N/A 5/21/2024    ESOPHAGOGASTRODUODENOSCOPY performed by Jose Radford MD at [facility] ENDOSCOPY       Current Medications:    Outpatient Medications Marked as Taking for the 6/23/24 encounter (Hospital Encounter)   Medication Sig Dispense Refill    oxyCODONE (ROXICODONE) 5 MG immediate release tablet Take 1 tablet by mouth every 6 hours as needed for Pain.

## 2024-06-26 LAB
ANION GAP SERPL CALCULATED.3IONS-SCNC: 14 MMOL/L (ref 3–16)
BASOPHILS # BLD: 0.3 K/UL (ref 0–0.2)
BASOPHILS NFR BLD: 2.6 %
BUN SERPL-MCNC: 58 MG/DL (ref 7–20)
CALCIUM SERPL-MCNC: 9.2 MG/DL (ref 8.3–10.6)
CHLORIDE SERPL-SCNC: 106 MMOL/L (ref 99–110)
CO2 SERPL-SCNC: 18 MMOL/L (ref 21–32)
CREAT SERPL-MCNC: 3.7 MG/DL (ref 0.8–1.3)
DEPRECATED RDW RBC AUTO: 19.6 % (ref 12.4–15.4)
EOSINOPHIL # BLD: 0.1 K/UL (ref 0–0.6)
EOSINOPHIL NFR BLD: 0.4 %
GFR SERPLBLD CREATININE-BSD FMLA CKD-EPI: 15 ML/MIN/{1.73_M2}
GLUCOSE SERPL-MCNC: 129 MG/DL (ref 70–99)
HCT VFR BLD AUTO: 23 % (ref 40.5–52.5)
HGB BLD-MCNC: 7.5 G/DL (ref 13.5–17.5)
LYMPHOCYTES # BLD: 1 K/UL (ref 1–5.1)
LYMPHOCYTES NFR BLD: 7.4 %
MAGNESIUM SERPL-MCNC: 2.3 MG/DL (ref 1.8–2.4)
MCH RBC QN AUTO: 25.5 PG (ref 26–34)
MCHC RBC AUTO-ENTMCNC: 32.6 G/DL (ref 31–36)
MCV RBC AUTO: 78.1 FL (ref 80–100)
MONOCYTES # BLD: 1 K/UL (ref 0–1.3)
MONOCYTES NFR BLD: 7.6 %
NEUTROPHILS # BLD: 10.9 K/UL (ref 1.7–7.7)
NEUTROPHILS NFR BLD: 82 %
PLATELET # BLD AUTO: 139 K/UL (ref 135–450)
PMV BLD AUTO: 9.1 FL (ref 5–10.5)
POTASSIUM SERPL-SCNC: 4.5 MMOL/L (ref 3.5–5.1)
RBC # BLD AUTO: 2.94 M/UL (ref 4.2–5.9)
SODIUM SERPL-SCNC: 138 MMOL/L (ref 136–145)
WBC # BLD AUTO: 13.2 K/UL (ref 4–11)

## 2024-06-26 PROCEDURE — 80048 BASIC METABOLIC PNL TOTAL CA: CPT

## 2024-06-26 PROCEDURE — 2500000003 HC RX 250 WO HCPCS: Performed by: INTERNAL MEDICINE

## 2024-06-26 PROCEDURE — 6370000000 HC RX 637 (ALT 250 FOR IP): Performed by: HOSPITALIST

## 2024-06-26 PROCEDURE — 2580000003 HC RX 258: Performed by: HOSPITALIST

## 2024-06-26 PROCEDURE — 36415 COLL VENOUS BLD VENIPUNCTURE: CPT

## 2024-06-26 PROCEDURE — 6360000002 HC RX W HCPCS: Performed by: HOSPITALIST

## 2024-06-26 PROCEDURE — 99232 SBSQ HOSP IP/OBS MODERATE 35: CPT | Performed by: INTERNAL MEDICINE

## 2024-06-26 PROCEDURE — 83735 ASSAY OF MAGNESIUM: CPT

## 2024-06-26 PROCEDURE — 6370000000 HC RX 637 (ALT 250 FOR IP): Performed by: INTERNAL MEDICINE

## 2024-06-26 PROCEDURE — 1200000000 HC SEMI PRIVATE

## 2024-06-26 PROCEDURE — 2580000003 HC RX 258: Performed by: INTERNAL MEDICINE

## 2024-06-26 PROCEDURE — 85025 COMPLETE CBC W/AUTO DIFF WBC: CPT

## 2024-06-26 PROCEDURE — 6360000002 HC RX W HCPCS: Performed by: REGISTERED NURSE

## 2024-06-26 RX ORDER — MAGNESIUM SULFATE IN WATER 40 MG/ML
2000 INJECTION, SOLUTION INTRAVENOUS ONCE
Status: COMPLETED | OUTPATIENT
Start: 2024-06-26 | End: 2024-06-26

## 2024-06-26 RX ORDER — HYDRALAZINE HYDROCHLORIDE 25 MG/1
25 TABLET, FILM COATED ORAL EVERY 8 HOURS SCHEDULED
Status: DISCONTINUED | OUTPATIENT
Start: 2024-06-26 | End: 2024-06-28

## 2024-06-26 RX ADMIN — HYDRALAZINE HYDROCHLORIDE 10 MG: 10 TABLET, FILM COATED ORAL at 09:22

## 2024-06-26 RX ADMIN — MEROPENEM 500 MG: 500 INJECTION, POWDER, FOR SOLUTION INTRAVENOUS at 21:42

## 2024-06-26 RX ADMIN — MAGNESIUM SULFATE HEPTAHYDRATE 2000 MG: 40 INJECTION, SOLUTION INTRAVENOUS at 02:18

## 2024-06-26 RX ADMIN — FINASTERIDE 5 MG: 5 TABLET, FILM COATED ORAL at 09:22

## 2024-06-26 RX ADMIN — MIRTAZAPINE 7.5 MG: 15 TABLET, FILM COATED ORAL at 21:32

## 2024-06-26 RX ADMIN — MEROPENEM 500 MG: 500 INJECTION, POWDER, FOR SOLUTION INTRAVENOUS at 09:21

## 2024-06-26 RX ADMIN — METOPROLOL TARTRATE 25 MG: 25 TABLET, FILM COATED ORAL at 09:22

## 2024-06-26 RX ADMIN — HYDRALAZINE HYDROCHLORIDE 25 MG: 25 TABLET ORAL at 21:32

## 2024-06-26 RX ADMIN — FLUTICASONE PROPIONATE 1 SPRAY: 50 SPRAY, METERED NASAL at 09:22

## 2024-06-26 RX ADMIN — Medication 6 MG: at 21:32

## 2024-06-26 RX ADMIN — METOPROLOL TARTRATE 25 MG: 25 TABLET, FILM COATED ORAL at 21:32

## 2024-06-26 RX ADMIN — TAMSULOSIN HYDROCHLORIDE 0.4 MG: 0.4 CAPSULE ORAL at 09:22

## 2024-06-26 RX ADMIN — Medication: at 12:43

## 2024-06-26 RX ADMIN — NIFEDIPINE 30 MG: 30 TABLET, EXTENDED RELEASE ORAL at 09:22

## 2024-06-26 ASSESSMENT — PAIN SCALES - WONG BAKER
WONGBAKER_NUMERICALRESPONSE: HURTS EVEN MORE

## 2024-06-26 ASSESSMENT — PAIN SCALES - GENERAL
PAINLEVEL_OUTOF10: 0
PAINLEVEL_OUTOF10: 0

## 2024-06-26 NOTE — PROGRESS NOTES
EKG completed.  Labs drawn per order.  EKG results placed in the chart.  Meg notified.   henrietta Rodríguez RN notified.  Pt remains asymptomatic.  Will continue to monitor.

## 2024-06-26 NOTE — CARE COORDINATION
6/26 Call placed to josef Grace regarding no available beds at [facility].Electronically signed by Tracy Garcia RN on 6/26/2024 at 2:14 PM'

## 2024-06-26 NOTE — PROGRESS NOTES
Call received from [facility] that pt was in Af[facility].  Pt assess and found to be asymptomatic.  Denies chest pain. Vital signs stable.  Order placed for a STAT EKG.  Perfect serve message sent to notify the Hospitalist.  Meg Rothman NP placed new orders.  Will continue to monitor.

## 2024-06-26 NOTE — PROGRESS NOTES
V2.0  [facility] [place] [place] Hospitalist Progress Note      Name:  Reji Kaufman /Age/Sex: 1940  (83 y.o. male)   MRN & CSN:  [id number] & [id number] Encounter Date/Time: 2024 2:35 PM EDT    Location:  [place] [address]- PCP: Luis Felipe Jackson MD       Hospital Day: 4      Subjective:     Chief Complaint:  Altered Mental Status (Pt family states pt is not acting right and may have a UTI. Dementia at baseline with a chronic corado. )     Patient seen and examined at bedside. Alert, has no new complaints or concerns.         Assessment and Plan:   Severe sepsis. Initially suspected 2/2 UTI. P/w leukocytosis, tachycardia. LA 3.4.   UA suggestive of infection but urine cx negative. Blood cx negative so far.   Recent prior hx ESBL   Has been on merrem  Leukocytosis not resolving still but slightly improved 13.2 from 13.7 yesterday.   ID managing.     acute metabolic encephalopathy. Likely 2/2 sepsis.   Now coming back to baseline.     EVELINA on CKD. Baseline Cr 1.9 came w 3.5. still not resolving despite IVF  Has chronic corado  - nephro managing  - on IVF  - avoid nephrotoxins    Essential HTN. BP stable. Resume home meds    Acute on chronic anemia.  Suspect anemia of chronic disease given CKD  Hgb dropped to 6.8 s/p 1u pRBC .   No sign of bleeding  Fe 21, but ferritin >1000.   Monitor CBC daily    Goals of care. Family interested in hospice.   Consulted.    Personally reviewed Lab Studies and Imaging     Drugs that require monitoring for toxicity include merrem and the method of monitoring was cbc      Diet ADULT ORAL NUTRITION SUPPLEMENT; Breakfast, Lunch, Dinner; Standard High Calorie/High Protein Oral Supplement  ADULT ORAL NUTRITION SUPPLEMENT; Dinner; Wound Healing Oral Supplement  ADULT DIET; Dysphagia - Soft and Bite Sized; No Added Salt (3-4 gm)   DVT Prophylaxis [] Lovenox, []  Heparin, [x] SCDs, [] Ambulation,  [] Eliquis, [] Xarelto  [] Coumadin   Code Status Full Code   Disposition From: [facility]  Expected Disposition:     WBC 13.2 (H) 4.0 - 11.0 K/uL    RBC 2.94 (L) 4.20 - 5.90 M/uL    Hemoglobin 7.5 (L) 13.5 - 17.5 g/dL    Hematocrit 23.0 (L) 40.5 - 52.5 %    MCV 78.1 (L) 80.0 - 100.0 fL    MCH 25.5 (L) 26.0 - 34.0 pg    MCHC 32.6 31.0 - 36.0 g/dL    RDW 19.6 (H) 12.4 - 15.4 %    Platelets 139 135 - 450 K/uL    MPV 9.1 5.0 - 10.5 fL    Neutrophils % 82.0 %    Lymphocytes % 7.4 %    Monocytes % 7.6 %    Eosinophils % 0.4 %    Basophils % 2.6 %    Neutrophils Absolute 10.9 (H) 1.7 - 7.7 K/uL    Lymphocytes Absolute 1.0 1.0 - 5.1 K/uL    Monocytes Absolute 1.0 0.0 - 1.3 K/uL    Eosinophils Absolute 0.1 0.0 - 0.6 K/uL    Basophils Absolute 0.3 (H) 0.0 - 0.2 K/uL   Basic Metabolic Panel    Collection Time: 06/26/24  4:26 AM   Result Value Ref Range    Sodium 138 136 - 145 mmol/L    Potassium 4.5 3.5 - 5.1 mmol/L    Chloride 106 99 - 110 mmol/L    CO2 18 (L) 21 - 32 mmol/L    Anion Gap 14 3 - 16    Glucose 129 (H) 70 - 99 mg/dL    BUN 58 (H) 7 - 20 mg/dL    Creatinine 3.7 (H) 0.8 - 1.3 mg/dL    Est, Glom Filt Rate 15 (A) >60    Calcium 9.2 8.3 - 10.6 mg/dL   Magnesium    Collection Time: 06/26/24  4:26 AM   Result Value Ref Range    Magnesium 2.30 1.80 - 2.40 mg/dL        Imaging/Diagnostics Last 24 Hours   XR CHEST (2 VW)    Result Date: 6/23/2024  EXAMINATION: TWO XRAY VIEWS OF THE CHEST 6/23/2024 8:45 pm COMPARISON: Chest radiograph 05/22/2024. HISTORY: ORDERING SYSTEM PROVIDED HISTORY: ams TECHNOLOGIST PROVIDED HISTORY: Reason for exam:->ams Reason for Exam: ams FINDINGS: The cardiomediastinal silhouette is within normal limits.  Shallow inspiration.  Mild bibasilar opacities.  No pneumothorax, vascular congestion, consolidation, or pleural effusion is identified.  No acute osseous abnormality.     Shallow inspiration with mild bibasilar opacities likely representing atelectasis.     CT HEAD WO CONTRAST    Result Date: 6/23/2024  EXAMINATION: CT OF THE HEAD WITHOUT CONTRAST  6/23/2024 7:24 pm TECHNIQUE: CT of the head was performed

## 2024-06-26 NOTE — PROGRESS NOTES
Pt removed his bilateral heel boots and SCDs.  This RN found them on the floor bedside the bed.  Pt refused for the items to be replaced.  Attempted to educate pt on the reason for the items.  Pt refused the education and continues to refuse the items.  He did allow for pillows to be used to elevate his heels off the bed.  Will continue to attempt to place items when pt is less upset.  Will continue to monitor.

## 2024-06-26 NOTE — CARE COORDINATION
6/26 Call received from Andre Grace regarding Hospice referral. He has decided to go with [facility]. Referral  and order faxed to [facility] at [phone]. Call Dot from [facility] with discharge plan [phone]. [facility] notified of change in POC. Electronically signed by Tracy Garcia RN on 6/26/2024 at 12:37 PM

## 2024-06-26 NOTE — CARE COORDINATION
6/26 Plan: Return to [facility] with [facility] admit vs [facility]. Referral called to [facility]. Follow. Electronically signed by Tracy Garcia RN on 6/26/2024 at 9:23 AM

## 2024-06-26 NOTE — PROGRESS NOTES
[facility]  Phone: [phone]  Fax: [phone]  QVPN         Reason for Consult: EVELINA on CKD    Requesting Physician:  Dr. Fields    History Obtained From: electronic medical record    History of Present Ilness: 83-year-old -American gentleman who was sent from the nursing home with increasing confusion  History obtained from the medical records as patient is unable to provide any reliable information he has history of EVELINA in May 2024 in the setting of sepsis and urinary tract infection his creatinine improved to 1.8 mg at the time of discharge from a peak of 2.8 mg  He has history of metastatic prostate cancer hypertension dementia and chronic indwelling Corado catheter his previous cultures have grown ESBL Klebsiella in the urine and Enterococcus faecalis as well in May 2024  On presentation is noted to have a creatinine of 3.5 mg  He was hypertensive and tachycardic with a temperature of 100.2 degrees  Patient was started on meropenem and vancomycin  We are asked to see him for his EVELINA on CKD          Review of Systems: HGB down to 6.8 gm  S/P PRBC   Awake answering simple questions Denies abd pain nausea SOB         Physical exam:   Constitutional:  VITALS:  BP (!) 152/69   Pulse 85   Temp 97.8 °F (36.6 °C) (Oral)   Resp 16   Ht 1.829 m (6' 0.01\")   Wt 97 kg (213 lb 13.5 oz)   SpO2 100%   BMI 29.00 kg/m²   Gen: alert, awake, AND oriented to person  and city  Skin: no rash, turgor decreased  Heent:  eomi, mmm  Neck: no bruits or jvd noted, thyroid normal  Cardiovascular:  S1, S2 without m/r/g tachycardic  Respiratory: CTA B without w/r/r; respiratory effort normal  Abdomen:  +bs, soft, nt, nd, corado in place  Ext: no lower extremity edema    Data/  CBC:   Lab Results   Component Value Date/Time    WBC 13.2 06/26/2024 04:26 AM    RBC 2.94 06/26/2024 04:26 AM    HGB 7.5 06/26/2024 04:26 AM    HCT 23.0 06/26/2024 04:26 AM    MCV 78.1 06/26/2024 04:26 AM    MCH 25.5

## 2024-06-26 NOTE — PROGRESS NOTES
Infectious Diseases Inpatient Progress Note      CHIEF COMPLAINT:     Change in Mentation  ESBL UTI  Candelario  EVELINA ON CKD  Wbc ELEVATION   Prostate cancer      HISTORY OF PRESENT ILLNESS:  83 y.o. old man with history of dementia admitted from nursing facility secondary  increasing confusion. Patient has a chronic Candelario catheter in place for obstructive symptoms secondary to prostate issues. He was diagnosed to have urinary retention now has long term  Candelario catheter placement as well as a diagnosed metastatic prostate cancer. He is following with oncology [facility] was recently started on Lupron injection. He was recently admitted to [facility] with sepsis secondary to Candelario catheter associated urinary tract infection Klebsiella ESBL bacteremia.  He finished a long course of IV antibiotics at the nursing facility and his PICC line has been removed.  On admission he was found to have fever Tmax 100.2 WBC elevated creatinine 3.5 blood culture in process urine culture abnormal urine culture in process hepatitis panel negative, we are consulted for IV antibiotic recommendations    Interval history: Family is at bedside mental status improving WBC slowly improving creatinine remains elevated at 3.7, hemoglobin low at 6.7 Status post PRBC AND CANDELARIO in place working well       Past Medical History:    Past Medical History:   Diagnosis Date    Alcohol abuse     AV block, 1st degree     Hypertension        Past Surgical History:    Past Surgical History:   Procedure Laterality Date    COLONOSCOPY N/A 5/21/2024    COLONOSCOPY performed by Jose Radford MD at [facility] ENDOSCOPY    UPPER GASTROINTESTINAL ENDOSCOPY N/A 5/21/2024    ESOPHAGOGASTRODUODENOSCOPY performed by Jose Radford MD at [facility] ENDOSCOPY       Current Medications:    Outpatient Medications Marked as Taking for the 6/23/24 encounter (Hospital Encounter)   Medication Sig Dispense Refill    oxyCODONE (ROXICODONE) 5 MG immediate release tablet  NAAT Not Detected    Comment: Rapid NAAT:   Negative results should be treated as presumptive and,  if inconsistent with clinical signs and symptoms or necessary for  patient management, should be tested with an alternative molecular  assay. Negative results do not preclude SARS-CoV-2 infection and  should not be used as the sole basis for patient management decisions.  This test has been authorized by the FDA under an Emergency Use  Authorization (EUA) for use by authorized laboratories.    Fact sheet for Healthcare Providers:  https://www.fda.gov/media/529097/download  Fact sheet for Patients: https://www.fda.gov/media/828607/download    METHODOLOGY: Isothermal Nucleic Acid Amplification      Rapid influenza A/B antigens [[id number]] Collected: 06/23/24 2355   Order Status: Completed Specimen: Nasopharyngeal Updated: 06/24/24 0038    Rapid Influenza A Ag Negative    Rapid Influenza B Ag Negative   Culture, Urine [[id number]] Collected: 06/23/24 2319   Order Status: No result Updated: 06/23/24 2319   Culture, Blood 2 [[id number]] Collected: 06/23/24 2046   Order Status: Sent Specimen: Blood Updated: 06/23/24 2100   Culture, Blood 1 [[id number]] Collected: 06/23/2     Blood Culture:   Lab Results   Component Value Date/Time    BC  06/23/2024 08:46 PM     No Growth to date.  Any change in status will be called.    BLOODCULT2  06/23/2024 08:46 PM     No Growth to date.  Any change in status will be called.       Culture, Blood 2 [[id number]] (Abnormal) Collected: 05/15/24 0227   Order Status: Completed Specimen: Blood Updated: 05/17/24 0616    Organism Klebsiella pneumoniae ESBL Abnormal     Culture, Blood 2 -- Abnormal     POSITIVE for  No further workup  CONTACT PRECAUTIONS INDICATED  Carbapenem antibiotics are the best option for infections caused  by ESBL producing organisms.  Other antibiotic classes are  likely to result in treatment failure, even for organisms  demonstrating in vitro susceptibility.  Isolated  provider] enoxaparin  30 mg SubCUTAneous Daily    finasteride  5 mg Oral Daily    fluticasone  1 spray Each Nostril Daily    hydrALAZINE  10 mg Oral 2 times per day    melatonin  6 mg Oral Nightly    metoprolol tartrate  25 mg Oral BID    mirtazapine  7.5 mg Oral Nightly    NIFEdipine  30 mg Oral Daily    senna  1 tablet Oral Nightly    tamsulosin  0.4 mg Oral Daily    meropenem  500 mg IntraVENous Q12H       Continuous Infusions:   sodium chloride      sodium bicarbonate 75 mEq in sodium chloride 0.45 % 1,000 mL infusion 100 mL/hr at 06/26/24 1243    sodium chloride 5 mL/hr at 06/25/24 2245       PRN Meds:  sodium chloride, oxyCODONE-acetaminophen **OR** oxyCODONE-acetaminophen, sodium chloride flush, sodium chloride, acetaminophen **OR** acetaminophen, hydrALAZINE    Imaging:   XR CHEST (2 VW)   Final Result   Shallow inspiration with mild bibasilar opacities likely representing   atelectasis.         CT HEAD WO CONTRAST   Final Result   No acute intracranial abnormality.             All pertinent images and reports for the current Hospitalization were reviewed by me.    IMPRESSION:    Patient Active Problem List   Diagnosis Code    EVELINA (acute kidney injury) ([facility]) N17.9    Altered mental status R41.82    Hypernatremia E87.0    Septicemia ([facility]) A41.9    ESBL (extended spectrum beta-lactamase) producing bacteria infection A49.9, Z16.12    Metastatic adenocarcinoma to prostate ([facility]) C79.82    Lactic acid acidosis E87.20    Sepsis with acute renal failure and tubular necrosis without septic shock ([facility]) A41.9, R65.20, N17.0    Severe sepsis ([facility]) A41.9, R65.20    Acute metabolic encephalopathy G93.41    CKD stage 3b, GFR 30-44 ml/min ([facility]) N18.32    HTN (hypertension) I10    UTI (urinary tract infection) due to urinary indwelling catheter ([facility]) T83.511A, N39.0    Abnormal liver enzymes R74.8    Chronic anemia D64.9    Acute hyponatremia E87.1    Prostate cancer metastatic to bone ([facility]) 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

## 2024-06-26 NOTE — PROGRESS NOTES
Patient bathed at this time. Incontinence care and corado care provided. Gown and linen changed. Corado draining yellow urine. Repositioned and turned in bed. R foot boot applied. Patient refusing SCDs. MD notified. In bed in locked and lowest position. Call light within reach.   Granddaughter Asif updated via phone. All questions answered.   No other needs at this time.

## 2024-06-26 NOTE — PLAN OF CARE
Problem: Discharge Planning  Goal: Discharge to home or other facility with appropriate resources  6/26/2024 1526 by Mariela Rothman RN  Outcome: Progressing  Flowsheets (Taken 6/26/2024 0925)  Discharge to home or other facility with appropriate resources: Identify barriers to discharge with patient and caregiver  6/26/2024 0206 by Elvis Joyner RN  Outcome: Progressing     Problem: Skin/Tissue Integrity  Goal: Absence of new skin breakdown  Description: 1.  Monitor for areas of redness and/or skin breakdown  2.  Assess vascular access sites hourly  3.  Every 4-6 hours minimum:  Change oxygen saturation probe site  4.  Every 4-6 hours:  If on nasal continuous positive airway pressure, respiratory therapy assess nares and determine need for appliance change or resting period.  6/26/2024 1526 by Mariela Rothman RN  Outcome: Progressing  6/26/2024 0206 by Elvis Joyner RN  Outcome: Progressing     Problem: Safety - Adult  Goal: Free from fall injury  6/26/2024 1526 by Mariela Rothman RN  Outcome: Progressing  6/26/2024 0206 by Elvis Joyner RN  Outcome: Progressing     Problem: Nutrition Deficit:  Goal: Optimize nutritional status  6/26/2024 1526 by Mariela Rothman RN  Outcome: Progressing  6/26/2024 0206 by Elvis Joyner RN  Outcome: Progressing     Problem: Pain  Goal: Verbalizes/displays adequate comfort level or baseline comfort level  6/26/2024 1526 by Mariela Rothman RN  Outcome: Progressing  6/26/2024 0206 by Elvis Joyner RN  Outcome: Progressing     Problem: ABCDS Injury Assessment  Goal: Absence of physical injury  6/26/2024 1526 by Mariela Rothman RN  Outcome: Progressing  6/26/2024 0206 by Elvis Joyner RN  Outcome: Progressing

## 2024-06-26 NOTE — PLAN OF CARE

## 2024-06-27 LAB
ANION GAP SERPL CALCULATED.3IONS-SCNC: 14 MMOL/L (ref 3–16)
BASOPHILS # BLD: 0.2 K/UL (ref 0–0.2)
BASOPHILS NFR BLD: 1.1 %
BUN SERPL-MCNC: 59 MG/DL (ref 7–20)
CALCIUM SERPL-MCNC: 9.6 MG/DL (ref 8.3–10.6)
CHLORIDE SERPL-SCNC: 105 MMOL/L (ref 99–110)
CO2 SERPL-SCNC: 21 MMOL/L (ref 21–32)
CREAT SERPL-MCNC: 3.4 MG/DL (ref 0.8–1.3)
DEPRECATED RDW RBC AUTO: 19.9 % (ref 12.4–15.4)
EOSINOPHIL # BLD: 0.2 K/UL (ref 0–0.6)
EOSINOPHIL NFR BLD: 1.1 %
GFR SERPLBLD CREATININE-BSD FMLA CKD-EPI: 17 ML/MIN/{1.73_M2}
GLUCOSE SERPL-MCNC: 115 MG/DL (ref 70–99)
HCT VFR BLD AUTO: 24.8 % (ref 40.5–52.5)
HGB BLD-MCNC: 8.1 G/DL (ref 13.5–17.5)
LYMPHOCYTES # BLD: 1.1 K/UL (ref 1–5.1)
LYMPHOCYTES NFR BLD: 7.6 %
MCH RBC QN AUTO: 25.3 PG (ref 26–34)
MCHC RBC AUTO-ENTMCNC: 32.4 G/DL (ref 31–36)
MCV RBC AUTO: 78.1 FL (ref 80–100)
MONOCYTES # BLD: 1.2 K/UL (ref 0–1.3)
MONOCYTES NFR BLD: 8.4 %
NEUTROPHILS # BLD: 11.8 K/UL (ref 1.7–7.7)
NEUTROPHILS NFR BLD: 81.8 %
PLATELET # BLD AUTO: 206 K/UL (ref 135–450)
PMV BLD AUTO: 8.4 FL (ref 5–10.5)
POTASSIUM SERPL-SCNC: 5.2 MMOL/L (ref 3.5–5.1)
RBC # BLD AUTO: 3.18 M/UL (ref 4.2–5.9)
SODIUM SERPL-SCNC: 140 MMOL/L (ref 136–145)
WBC # BLD AUTO: 14.5 K/UL (ref 4–11)

## 2024-06-27 PROCEDURE — 85025 COMPLETE CBC W/AUTO DIFF WBC: CPT

## 2024-06-27 PROCEDURE — 2500000003 HC RX 250 WO HCPCS: Performed by: INTERNAL MEDICINE

## 2024-06-27 PROCEDURE — 6360000002 HC RX W HCPCS: Performed by: HOSPITALIST

## 2024-06-27 PROCEDURE — 6370000000 HC RX 637 (ALT 250 FOR IP): Performed by: HOSPITALIST

## 2024-06-27 PROCEDURE — 6370000000 HC RX 637 (ALT 250 FOR IP): Performed by: INTERNAL MEDICINE

## 2024-06-27 PROCEDURE — 94760 N-INVAS EAR/PLS OXIMETRY 1: CPT

## 2024-06-27 PROCEDURE — 2580000003 HC RX 258: Performed by: HOSPITALIST

## 2024-06-27 PROCEDURE — 99232 SBSQ HOSP IP/OBS MODERATE 35: CPT | Performed by: INTERNAL MEDICINE

## 2024-06-27 PROCEDURE — 1200000000 HC SEMI PRIVATE

## 2024-06-27 PROCEDURE — 80048 BASIC METABOLIC PNL TOTAL CA: CPT

## 2024-06-27 PROCEDURE — 2580000003 HC RX 258: Performed by: INTERNAL MEDICINE

## 2024-06-27 PROCEDURE — 2580000003 HC RX 258: Performed by: STUDENT IN AN ORGANIZED HEALTH CARE EDUCATION/TRAINING PROGRAM

## 2024-06-27 PROCEDURE — 36415 COLL VENOUS BLD VENIPUNCTURE: CPT

## 2024-06-27 RX ADMIN — SODIUM CHLORIDE, PRESERVATIVE FREE 10 ML: 5 INJECTION INTRAVENOUS at 21:30

## 2024-06-27 RX ADMIN — SODIUM CHLORIDE: 9 INJECTION, SOLUTION INTRAVENOUS at 11:10

## 2024-06-27 RX ADMIN — SENNOSIDES 8.6 MG: 8.6 TABLET, FILM COATED ORAL at 21:16

## 2024-06-27 RX ADMIN — Medication: at 02:31

## 2024-06-27 RX ADMIN — Medication 6 MG: at 21:15

## 2024-06-27 RX ADMIN — HYDRALAZINE HYDROCHLORIDE 25 MG: 25 TABLET ORAL at 07:24

## 2024-06-27 RX ADMIN — METOPROLOL TARTRATE 25 MG: 25 TABLET, FILM COATED ORAL at 11:17

## 2024-06-27 RX ADMIN — ACETAMINOPHEN 325MG 650 MG: 325 TABLET ORAL at 21:18

## 2024-06-27 RX ADMIN — SODIUM CHLORIDE: 9 INJECTION, SOLUTION INTRAVENOUS at 21:02

## 2024-06-27 RX ADMIN — NIFEDIPINE 30 MG: 30 TABLET, EXTENDED RELEASE ORAL at 11:17

## 2024-06-27 RX ADMIN — TAMSULOSIN HYDROCHLORIDE 0.4 MG: 0.4 CAPSULE ORAL at 11:17

## 2024-06-27 RX ADMIN — SODIUM CHLORIDE, PRESERVATIVE FREE 10 ML: 5 INJECTION INTRAVENOUS at 11:12

## 2024-06-27 RX ADMIN — FLUTICASONE PROPIONATE 1 SPRAY: 50 SPRAY, METERED NASAL at 11:16

## 2024-06-27 RX ADMIN — METOPROLOL TARTRATE 25 MG: 25 TABLET, FILM COATED ORAL at 21:15

## 2024-06-27 RX ADMIN — MEROPENEM 500 MG: 500 INJECTION, POWDER, FOR SOLUTION INTRAVENOUS at 11:12

## 2024-06-27 RX ADMIN — MIRTAZAPINE 7.5 MG: 15 TABLET, FILM COATED ORAL at 21:15

## 2024-06-27 RX ADMIN — MEROPENEM 500 MG: 500 INJECTION, POWDER, FOR SOLUTION INTRAVENOUS at 21:15

## 2024-06-27 RX ADMIN — Medication: at 16:55

## 2024-06-27 RX ADMIN — HYDRALAZINE HYDROCHLORIDE 25 MG: 25 TABLET ORAL at 21:15

## 2024-06-27 RX ADMIN — FINASTERIDE 5 MG: 5 TABLET, FILM COATED ORAL at 11:18

## 2024-06-27 RX ADMIN — HYDRALAZINE HYDROCHLORIDE 25 MG: 25 TABLET ORAL at 13:23

## 2024-06-27 ASSESSMENT — PAIN SCALES - WONG BAKER
WONGBAKER_NUMERICALRESPONSE: HURTS EVEN MORE
WONGBAKER_NUMERICALRESPONSE: NO HURT

## 2024-06-27 ASSESSMENT — PAIN DESCRIPTION - DESCRIPTORS: DESCRIPTORS: ACHING

## 2024-06-27 ASSESSMENT — PAIN DESCRIPTION - ORIENTATION: ORIENTATION: RIGHT;LOWER;MID

## 2024-06-27 ASSESSMENT — PAIN SCALES - GENERAL
PAINLEVEL_OUTOF10: 0
PAINLEVEL_OUTOF10: 3

## 2024-06-27 ASSESSMENT — PAIN - FUNCTIONAL ASSESSMENT: PAIN_FUNCTIONAL_ASSESSMENT: PREVENTS OR INTERFERES SOME ACTIVE ACTIVITIES AND ADLS

## 2024-06-27 ASSESSMENT — PAIN DESCRIPTION - LOCATION: LOCATION: LEG

## 2024-06-27 NOTE — PROGRESS NOTES
V2.0  [facility] Hospitalist Progress Note      Name:  Reji Kaufman /Age/Sex: 1940  (83 y.o. male)   MRN & CSN:  [id number] & [id number] Encounter Date/Time: 2024 2:35 PM EDT    Location:  [place] [address]- PCP: Luis Felipe Jackson MD       Hospital Day: 5      Subjective:     Chief Complaint:  Altered Mental Status (Pt family states pt is not acting right and may have a UTI. Dementia at baseline with a chronic corado. )     Patient seen and examined at bedside. Alert, has no new complaints or concerns.       Had a phone conversation with son Lesley.  Discussed CODE STATUS. He pt to be hospice after discharge, with DNR-CCA as code status.  When I asked about goals of care while in the hospital, he states he wants all medical problems to be addressed until he is stable for discharge.  They however wants him to go to hospice facility after discharge.      Assessment and Plan:   Severe sepsis. Initially suspected 2/2 UTI. P/w leukocytosis, tachycardia. LA 3.4.   UA suggestive of infection but urine cx negative. Blood cx negative so far.   Has chronic corado.   Recent prior hx ESBL   Has been on merrem  Leukocytosis not resolving likely due to cancer.    ID managing.     acute metabolic encephalopathy. Likely 2/2 sepsis.   Now coming back to baseline.     EVELINA on CKD. Baseline Cr 1.9 came w 3.5. still not resolving despite IVF  Has chronic corado  - nephro managing  - on IVF  - avoid nephrotoxins    Essential HTN. BP stable. Resumed home meds  Hydralazine added today    Acute on chronic anemia.  Suspect anemia of chronic disease given CKD  Hgb dropped to 6.8 s/p 1u pRBC .   No sign of bleeding  Fe 21, but ferritin >1000.   Monitor CBC daily    Goals of care. Family interested in hospice.   Code status changed to DNR-CCA as above.       Personally reviewed Lab Studies and Imaging     Drugs that require monitoring for toxicity include merrem and the method of monitoring was cbc      Diet ADULT ORAL NUTRITION

## 2024-06-27 NOTE — PLAN OF CARE
Problem: Discharge Planning  Goal: Discharge to home or other facility with appropriate resources  6/27/2024 1720 by Vickie Pace RN  Outcome: Progressing  6/27/2024 1714 by Vickie Pace RN  Outcome: Progressing  6/27/2024 0649 by Alyssa Clark RN  Outcome: Progressing     Problem: Skin/Tissue Integrity  Goal: Absence of new skin breakdown  Description: 1.  Monitor for areas of redness and/or skin breakdown  2.  Assess vascular access sites hourly  3.  Every 4-6 hours minimum:  Change oxygen saturation probe site  4.  Every 4-6 hours:  If on nasal continuous positive airway pressure, respiratory therapy assess nares and determine need for appliance change or resting period.  6/27/2024 1720 by Vickie Pace RN  Outcome: Progressing  6/27/2024 1714 by Vickie Pace RN  Outcome: Progressing  6/27/2024 0649 by Alyssa Clark RN  Outcome: Progressing     Problem: Safety - Adult  Goal: Free from fall injury  6/27/2024 1720 by Vickie Pace RN  Outcome: Progressing  6/27/2024 1714 by Vickie Pace RN  Outcome: Progressing  6/27/2024 0649 by Alyssa Clark RN  Outcome: Progressing     Problem: Nutrition Deficit:  Goal: Optimize nutritional status  6/27/2024 1720 by Vickie Pace RN  Outcome: Progressing  6/27/2024 1714 by Vickie Pace RN  Outcome: Progressing  6/27/2024 0649 by Alyssa Clark RN  Outcome: Progressing     Problem: Pain  Goal: Verbalizes/displays adequate comfort level or baseline comfort level  6/27/2024 1720 by Vickie Pace RN  Outcome: Progressing  6/27/2024 1714 by Vickie Pace RN  Outcome: Progressing     Problem: ABCDS Injury Assessment  Goal: Absence of physical injury  6/27/2024 1720 by Vickie Pace RN  Outcome: Progressing  6/27/2024 1714 by Vickie Pace RN  Outcome: Progressing

## 2024-06-27 NOTE — CARE COORDINATION
6/27 Call placed to Dot Guido [phone] regarding discharge plan. Andre Grace wishes to have patient remain in the hospital and have all current issues addressed until stable. When he is stable he will discharge to LTC and [facility] can admit him.Electronically signed by Tracy Garcia RN on 6/27/2024 at 12:24 PM

## 2024-06-27 NOTE — PROGRESS NOTES
Infectious Diseases Inpatient Progress Note      CHIEF COMPLAINT:     Change in Mentation  ESBL UTI  Rose  EVELINA ON CKD  Wbc ELEVATION   Prostate cancer      HISTORY OF PRESENT ILLNESS:  83 y.o. old man with history of dementia admitted from nursing facility secondary  increasing confusion. Patient has a chronic Rose catheter in place for obstructive symptoms secondary to prostate issues. He was diagnosed to have urinary retention now has long term  Rose catheter placement as well as a diagnosed metastatic prostate cancer. He is following with oncology [facility] was recently started on Lupron injection. He was recently admitted to [facility] with sepsis secondary to Rose catheter associated urinary tract infection Klebsiella ESBL bacteremia.  He finished a long course of IV antibiotics at the nursing facility and his PICC line has been removed.  On admission he was found to have fever Tmax 100.2 WBC elevated creatinine 3.5 blood culture in process urine culture abnormal urine culture in process hepatitis panel negative, we are consulted for IV antibiotic recommendations    Interval history: Sleepy today no abdominal pain Rose catheter working well WBC remains elevated urine culture negative blood cultures.,  Likewise creatinine is elevated at 3.4    Past Medical History:    Past Medical History:   Diagnosis Date    Alcohol abuse     AV block, 1st degree     Hypertension        Past Surgical History:    Past Surgical History:   Procedure Laterality Date    COLONOSCOPY N/A 5/21/2024    COLONOSCOPY performed by Jose Radford MD at [facility] ENDOSCOPY    UPPER GASTROINTESTINAL ENDOSCOPY N/A 5/21/2024    ESOPHAGOGASTRODUODENOSCOPY performed by Jose Radford MD at [facility] ENDOSCOPY       Current Medications:    Outpatient Medications Marked as Taking for the 6/23/24 encounter (Hospital Encounter)   Medication Sig Dispense Refill    oxyCODONE (ROXICODONE) 5 MG immediate release tablet Take 1 tablet by

## 2024-06-27 NOTE — PLAN OF CARE
Problem: Discharge Planning  Goal: Discharge to home or other facility with appropriate resources  Outcome: Progressing     Problem: Skin/Tissue Integrity  Goal: Absence of new skin breakdown  Description: 1.  Monitor for areas of redness and/or skin breakdown  2.  Assess vascular access sites hourly  3.  Every 4-6 hours minimum:  Change oxygen saturation probe site  4.  Every 4-6 hours:  If on nasal continuous positive airway pressure, respiratory therapy assess nares and determine need for appliance change or resting period.  Outcome: Progressing     Problem: Safety - Adult  Goal: Free from fall injury  Outcome: Progressing     Problem: Nutrition Deficit:  Goal: Optimize nutritional status  Outcome: Progressing

## 2024-06-27 NOTE — PROGRESS NOTES
[facility]  Phone: [phone]  Fax: [phone]  [employer]         Reason for Consult: EVELINA on CKD    Requesting Physician:  Dr. Fields    History Obtained From: electronic medical record    History of Present Ilness: 83-year-old -American gentleman who was sent from the nursing home with increasing confusion  History obtained from the medical records as patient is unable to provide any reliable information he has history of EVELINA in May 2024 in the setting of sepsis and urinary tract infection his creatinine improved to 1.8 mg at the time of discharge from a peak of 2.8 mg  He has history of metastatic prostate cancer hypertension dementia and chronic indwelling Corado catheter his previous cultures have grown ESBL Klebsiella in the urine and Enterococcus faecalis as well in May 2024  On presentation is noted to have a creatinine of 3.5 mg  He was hypertensive and tachycardic with a temperature of 100.2 degrees  Patient was started on meropenem and vancomycin  We are asked to see him for his EVELINA on CKD          Review of Systems:  S/P PRBC   Awake answering simple questions but appears confused   Denies abd pain nausea SOB    ml/24        Physical exam:   Constitutional:  VITALS:  BP (!) 147/65   Pulse (!) 104   Temp 97.9 °F (36.6 °C)   Resp 16   Ht 1.829 m (6' 0.01\")   Wt 97 kg (213 lb 13.5 oz)   SpO2 96%   BMI 29.00 kg/m²   Gen: alert, awake,confused  Skin: no rash, turgor decreased  Heent:  eomi, mmm  Neck: no bruits or jvd noted, thyroid normal  Cardiovascular:  S1, S2 without m/r/g tachycardic  Respiratory: CTA B without w/r/r; respiratory effort normal  Abdomen:  +bs, soft, nt, nd, corado in place  Ext: no lower extremity edema    Data/  CBC:   Lab Results   Component Value Date/Time    WBC 14.5 06/27/2024 05:42 AM    RBC 3.18 06/27/2024 05:42 AM    HGB 8.1 06/27/2024 05:42 AM    HCT 24.8 06/27/2024 05:42 AM    MCV 78.1 06/27/2024 05:42 AM    MCH 25.3 06/27/2024  05:42 AM    MCHC 32.4 06/27/2024 05:42 AM    RDW 19.9 06/27/2024 05:42 AM     06/27/2024 05:42 AM    MPV 8.4 06/27/2024 05:42 AM     BMP:    Lab Results   Component Value Date/Time     06/27/2024 05:42 AM    K 5.2 06/27/2024 05:42 AM    K 4.7 06/24/2024 05:30 AM     06/27/2024 05:42 AM    CO2 21 06/27/2024 05:42 AM    BUN 59 06/27/2024 05:42 AM    CREATININE 3.4 06/27/2024 05:42 AM    CALCIUM 9.6 06/27/2024 05:42 AM    LABGLOM 17 06/27/2024 05:42 AM    LABGLOM 74 04/18/2024 06:05 AM    GLUCOSE 115 06/27/2024 05:42 AM         Assessment/Plan       1-EVELINA on CKD suspect volume depletion cannot R/O ATN    his UA is consistent with persistent pyuria and possible UTI H/O ESBL UTI Cr stable at 3.4 mg today  Non oliguric  Continue IVF's and monitor   2-metabolic acidosis improving On  IVF's with [facility]  3-history of ESBL UTIs on merrem  4 hypertension blood pressure elevated  Increased Hydralazine to 25 mg TID   5 anemia microcytic recent ferritin was 677 in May 2024 S/P PRBC's HGB 8.1 gm   6 history of metastatic prostate cancer  7 Hyperkalemia on Ensure, change to renal supplement    D/W Dr Santizo, code status noted. Plan for hospice after discharge          Thank you for the consultation.  Please do not hesitate to call with questions.    Miguel Sandoval MD, FACP

## 2024-06-27 NOTE — CARE COORDINATION
6/27 Call received from patient's son Sanjay regarding additional facilities that he would like referrals sent to. Referrals faxed to [facility] and [facility] per request.Electronically signed by Tracy Garcia RN on 6/27/2024 at 9:30 AM

## 2024-06-27 NOTE — PROGRESS NOTES
Pt AOX3 with intermittent confusion - pt denied n/v, pain, sob, diarrhea   Bed in lowest and locked position   Specialty bed in use  Call light and bed side table within reach  Non skid socks and bed alarms on

## 2024-06-27 NOTE — PLAN OF CARE
Problem: Discharge Planning  Goal: Discharge to home or other facility with appropriate resources  6/27/2024 1714 by Vickie Pace RN  Outcome: Progressing  6/27/2024 0649 by Alyssa Clark RN  Outcome: Progressing     Problem: Skin/Tissue Integrity  Goal: Absence of new skin breakdown  Description: 1.  Monitor for areas of redness and/or skin breakdown  2.  Assess vascular access sites hourly  3.  Every 4-6 hours minimum:  Change oxygen saturation probe site  4.  Every 4-6 hours:  If on nasal continuous positive airway pressure, respiratory therapy assess nares and determine need for appliance change or resting period.  6/27/2024 1714 by Vickie Pace RN  Outcome: Progressing  6/27/2024 0649 by Alyssa Clark RN  Outcome: Progressing     Problem: Safety - Adult  Goal: Free from fall injury  6/27/2024 1714 by Vickie Pace RN  Outcome: Progressing  6/27/2024 0649 by Alyssa Clark RN  Outcome: Progressing     Problem: Nutrition Deficit:  Goal: Optimize nutritional status  6/27/2024 1714 by Vickie Pace RN  Outcome: Progressing  6/27/2024 0649 by Alyssa Clark RN  Outcome: Progressing     Problem: Pain  Goal: Verbalizes/displays adequate comfort level or baseline comfort level  Outcome: Progressing     Problem: ABCDS Injury Assessment  Goal: Absence of physical injury  Outcome: Progressing

## 2024-06-28 ENCOUNTER — APPOINTMENT (OUTPATIENT)
Dept: GENERAL RADIOLOGY | Age: 84
DRG: 698 | End: 2024-06-28
Payer: MEDICARE

## 2024-06-28 LAB
ALBUMIN SERPL-MCNC: 2.6 G/DL (ref 3.4–5)
ANION GAP SERPL CALCULATED.3IONS-SCNC: 12 MMOL/L (ref 3–16)
BACTERIA BLD CULT ORG #2: NORMAL
BACTERIA BLD CULT: NORMAL
BUN SERPL-MCNC: 54 MG/DL (ref 7–20)
CALCIUM SERPL-MCNC: 9.2 MG/DL (ref 8.3–10.6)
CHLORIDE SERPL-SCNC: 102 MMOL/L (ref 99–110)
CO2 SERPL-SCNC: 24 MMOL/L (ref 21–32)
CREAT SERPL-MCNC: 3.5 MG/DL (ref 0.8–1.3)
GFR SERPLBLD CREATININE-BSD FMLA CKD-EPI: 17 ML/MIN/{1.73_M2}
GLUCOSE SERPL-MCNC: 103 MG/DL (ref 70–99)
PHOSPHATE SERPL-MCNC: 3.3 MG/DL (ref 2.5–4.9)
POTASSIUM SERPL-SCNC: 5.2 MMOL/L (ref 3.5–5.1)
SODIUM SERPL-SCNC: 138 MMOL/L (ref 136–145)

## 2024-06-28 PROCEDURE — 6360000002 HC RX W HCPCS: Performed by: HOSPITALIST

## 2024-06-28 PROCEDURE — 73502 X-RAY EXAM HIP UNI 2-3 VIEWS: CPT

## 2024-06-28 PROCEDURE — 80069 RENAL FUNCTION PANEL: CPT

## 2024-06-28 PROCEDURE — 2500000003 HC RX 250 WO HCPCS: Performed by: INTERNAL MEDICINE

## 2024-06-28 PROCEDURE — 36415 COLL VENOUS BLD VENIPUNCTURE: CPT

## 2024-06-28 PROCEDURE — 1200000000 HC SEMI PRIVATE

## 2024-06-28 PROCEDURE — 6370000000 HC RX 637 (ALT 250 FOR IP): Performed by: INTERNAL MEDICINE

## 2024-06-28 PROCEDURE — 6370000000 HC RX 637 (ALT 250 FOR IP): Performed by: HOSPITALIST

## 2024-06-28 PROCEDURE — 94760 N-INVAS EAR/PLS OXIMETRY 1: CPT

## 2024-06-28 PROCEDURE — 2580000003 HC RX 258: Performed by: INTERNAL MEDICINE

## 2024-06-28 PROCEDURE — 99232 SBSQ HOSP IP/OBS MODERATE 35: CPT | Performed by: INTERNAL MEDICINE

## 2024-06-28 PROCEDURE — 2580000003 HC RX 258: Performed by: HOSPITALIST

## 2024-06-28 RX ORDER — HYDRALAZINE HYDROCHLORIDE 50 MG/1
50 TABLET, FILM COATED ORAL EVERY 8 HOURS SCHEDULED
Status: DISCONTINUED | OUTPATIENT
Start: 2024-06-28 | End: 2024-06-29

## 2024-06-28 RX ORDER — SODIUM CHLORIDE 9 MG/ML
INJECTION, SOLUTION INTRAVENOUS CONTINUOUS
Status: DISCONTINUED | OUTPATIENT
Start: 2024-06-28 | End: 2024-06-30

## 2024-06-28 RX ORDER — HYDRALAZINE HYDROCHLORIDE 50 MG/1
50 TABLET, FILM COATED ORAL EVERY 8 HOURS SCHEDULED
Status: CANCELLED | OUTPATIENT
Start: 2024-06-28

## 2024-06-28 RX ADMIN — HYDRALAZINE HYDROCHLORIDE 25 MG: 25 TABLET ORAL at 06:35

## 2024-06-28 RX ADMIN — SODIUM CHLORIDE, PRESERVATIVE FREE 10 ML: 5 INJECTION INTRAVENOUS at 09:24

## 2024-06-28 RX ADMIN — NIFEDIPINE 30 MG: 30 TABLET, EXTENDED RELEASE ORAL at 09:06

## 2024-06-28 RX ADMIN — MEROPENEM 500 MG: 500 INJECTION, POWDER, FOR SOLUTION INTRAVENOUS at 20:38

## 2024-06-28 RX ADMIN — HYDRALAZINE HYDROCHLORIDE 50 MG: 50 TABLET ORAL at 20:29

## 2024-06-28 RX ADMIN — MEROPENEM 500 MG: 500 INJECTION, POWDER, FOR SOLUTION INTRAVENOUS at 09:21

## 2024-06-28 RX ADMIN — ACETAMINOPHEN 325MG 650 MG: 325 TABLET ORAL at 20:29

## 2024-06-28 RX ADMIN — HYDRALAZINE HYDROCHLORIDE 50 MG: 50 TABLET ORAL at 16:05

## 2024-06-28 RX ADMIN — SODIUM CHLORIDE, PRESERVATIVE FREE 10 ML: 5 INJECTION INTRAVENOUS at 20:28

## 2024-06-28 RX ADMIN — FINASTERIDE 5 MG: 5 TABLET, FILM COATED ORAL at 09:06

## 2024-06-28 RX ADMIN — TAMSULOSIN HYDROCHLORIDE 0.4 MG: 0.4 CAPSULE ORAL at 09:06

## 2024-06-28 RX ADMIN — METOPROLOL TARTRATE 25 MG: 25 TABLET, FILM COATED ORAL at 09:06

## 2024-06-28 RX ADMIN — METOPROLOL TARTRATE 25 MG: 25 TABLET, FILM COATED ORAL at 20:30

## 2024-06-28 RX ADMIN — SENNOSIDES 8.6 MG: 8.6 TABLET, FILM COATED ORAL at 20:29

## 2024-06-28 RX ADMIN — Medication 6 MG: at 20:28

## 2024-06-28 RX ADMIN — SODIUM CHLORIDE: 9 INJECTION, SOLUTION INTRAVENOUS at 16:05

## 2024-06-28 RX ADMIN — MIRTAZAPINE 7.5 MG: 15 TABLET, FILM COATED ORAL at 20:30

## 2024-06-28 RX ADMIN — Medication: at 08:26

## 2024-06-28 RX ADMIN — FLUTICASONE PROPIONATE 1 SPRAY: 50 SPRAY, METERED NASAL at 09:07

## 2024-06-28 ASSESSMENT — PAIN DESCRIPTION - ORIENTATION: ORIENTATION: RIGHT

## 2024-06-28 ASSESSMENT — PAIN DESCRIPTION - DESCRIPTORS: DESCRIPTORS: ACHING

## 2024-06-28 ASSESSMENT — PAIN DESCRIPTION - LOCATION: LOCATION: LEG

## 2024-06-28 ASSESSMENT — PAIN SCALES - GENERAL
PAINLEVEL_OUTOF10: 0
PAINLEVEL_OUTOF10: 2

## 2024-06-28 ASSESSMENT — PAIN - FUNCTIONAL ASSESSMENT: PAIN_FUNCTIONAL_ASSESSMENT: PREVENTS OR INTERFERES SOME ACTIVE ACTIVITIES AND ADLS

## 2024-06-28 NOTE — PLAN OF CARE
Problem: Discharge Planning  Goal: Discharge to home or other facility with appropriate resources  6/28/2024 1102 by Meaghan Resendez RN  Outcome: Progressing     Problem: Skin/Tissue Integrity  Goal: Absence of new skin breakdown  Description: 1.  Monitor for areas of redness and/or skin breakdown  2.  Assess vascular access sites hourly  3.  Every 4-6 hours minimum:  Change oxygen saturation probe site  4.  Every 4-6 hours:  If on nasal continuous positive airway pressure, respiratory therapy assess nares and determine need for appliance change or resting period.  6/28/2024 1102 by Meaghan Resendez, RN  Outcome: Progressing     Problem: Safety - Adult  Goal: Free from fall injury  6/28/2024 1102 by Meaghan Resendez RN  Outcome: Progressing     Problem: Nutrition Deficit:  Goal: Optimize nutritional status  6/28/2024 1102 by Meaghan Resendez, RN  Outcome: Progressing     Problem: Pain  Goal: Verbalizes/displays adequate comfort level or baseline comfort level  6/28/2024 1102 by Meaghan Resendez, RN  Outcome: Progressing     Problem: ABCDS Injury Assessment  Goal: Absence of physical injury  6/28/2024 1102 by Meaghan Resendez, RN  Outcome: Progressing

## 2024-06-28 NOTE — PROGRESS NOTES
Infectious Diseases Inpatient Progress Note      CHIEF COMPLAINT:     Change in Mentation  ESBL UTI  Rose  EVELINA ON CKD  Wbc ELEVATION   Prostate cancer      HISTORY OF PRESENT ILLNESS:  83 y.o. old man with history of dementia admitted from nursing facility secondary  increasing confusion. Patient has a chronic Rose catheter in place for obstructive symptoms secondary to prostate issues. He was diagnosed to have urinary retention now has long term  Rose catheter placement as well as a diagnosed metastatic prostate cancer. He is following with oncology [facility] was recently started on Lupron injection. He was recently admitted to [facility] with sepsis secondary to Rose catheter associated urinary tract infection Klebsiella ESBL bacteremia.  He finished a long course of IV antibiotics at the nursing facility and his PICC line has been removed.  On admission he was found to have fever Tmax 100.2 WBC elevated creatinine 3.5 blood culture in process urine culture abnormal urine culture in process hepatitis panel negative, we are consulted for IV antibiotic recommendations    Interval history: Sleepy today no abdominal pain Rose catheter working well WBC remains elevated urine culture negative blood cultures.,  Likewise creatinine is elevated at 3.4    Past Medical History:    Past Medical History:   Diagnosis Date    Alcohol abuse     AV block, 1st degree     Hypertension        Past Surgical History:    Past Surgical History:   Procedure Laterality Date    COLONOSCOPY N/A 5/21/2024    COLONOSCOPY performed by Jose Radford MD at [facility] ENDOSCOPY    UPPER GASTROINTESTINAL ENDOSCOPY N/A 5/21/2024    ESOPHAGOGASTRODUODENOSCOPY performed by Jose Radford MD at [facility] ENDOSCOPY       Current Medications:    Outpatient Medications Marked as Taking for the 6/23/24 encounter (Hospital Encounter)   Medication Sig Dispense Refill    oxyCODONE (ROXICODONE) 5 MG immediate release tablet Take 1 tablet by  status, unspecified altered mental status type  R41.82       2. Urinary tract infection associated with indwelling urethral catheter, initial encounter ([facility])  T83.511A     N39.0       3. Septicemia ([facility])  A41.9         Sepsis  Recurrent urinary tract infection  History of prostate cancer  EVELINA CKD3b  Long-term Quiñonez catheter in place for bladder outlet symptoms  Catheter associated tract infection  Recent Klebsiella ESBL bacteremia  Nursing home resident  Metabolic encephalopathy  Fevers  Nursing home resident  Metastatic abdominal and pelvic adenopathy from prostate cancer  Multiple bone mets from prostate cancer  WBC elevation         Labs, Microbiology, Radiology and pertinent results from current hospitalization and care every where were reviewed by me as a part of the consultation.    PLAN :  Cont IV Meropenem x  500 mg  Q 12 h for now as in patient   Blood cx  so far negative  Trend WBC  Urine cx  negative  Quiñonez replaced  Contact isolation  Will be able to DC IV antibiotic once WBC trend down  WBC can be reactive from the Metastatic Prostate cancer      Check Procal in AM       Discussed with patient/Family and Nursing discussed with daughter at bedside    Medical Decision Making:  The following items were considered in medical decision making:  Discussion of patient care with other providers  Reviewed clinical lab tests  Reviewed radiology tests  Reviewed other diagnostic tests/interventions  Independent review of radiologic images  Independent review of  Microbiology cultures and other micro tests reviewed     Risk of Complications/Morbidity: High      Illness(es)/ Infection present that pose threat to bodily function.   There is potential for severe exacerbation of infection/side effects of treatment.  Therapy requires intensive monitoring for antimicrobial agent toxicity.     Thanks for allowing me to participate in your patient's care please call me with any questions or concerns.    Dr. Dorys Quiñones  MD  Infectious Disease  [facility]  Phone: [phone]   Fax : [phone]

## 2024-06-28 NOTE — PROGRESS NOTES
[facility]  Phone: [phone]  Fax: [phone]  [employer]         Reason for Consult: EVELINA on CKD    Requesting Physician:  Dr. Fields    History Obtained From: electronic medical record    History of Present Ilness: 83-year-old -American gentleman who was sent from the nursing home with increasing confusion  History obtained from the medical records as patient is unable to provide any reliable information he has history of EVELINA in May 2024 in the setting of sepsis and urinary tract infection his creatinine improved to 1.8 mg at the time of discharge from a peak of 2.8 mg  He has history of metastatic prostate cancer hypertension dementia and chronic indwelling Corado catheter his previous cultures have grown ESBL Klebsiella in the urine and Enterococcus faecalis as well in May 2024  On presentation is noted to have a creatinine of 3.5 mg  He was hypertensive and tachycardic with a temperature of 100.2 degrees  Patient was started on meropenem and vancomycin  We are asked to see him for his EVELINA on CKD          Review of Systems:    Awake answering simple questions    Denies abd pain nausea SOB   UOP 2200 ml/24        Physical exam:   Constitutional:  VITALS:  BP (!) 157/70   Pulse 91   Temp 99.9 °F (37.7 °C) (Oral)   Resp 17   Ht 1.829 m (6' 0.01\")   Wt 87.5 kg (192 lb 14.4 oz) Comment: air pump removed from bed  SpO2 97%   BMI 26.16 kg/m²   Gen: alert, awake,confused  Skin: no rash, turgor decreased  Heent:  eomi, mmm  Neck: no bruits or jvd noted, thyroid normal  Cardiovascular:  S1, S2 without m/r/g tachycardic  Respiratory: CTA B without w/r/r; respiratory effort normal  Abdomen:  +bs, soft, nt, nd, corado in place  Ext: no lower extremity edema    Data/  CBC:   Lab Results   Component Value Date/Time    WBC 14.5 06/27/2024 05:42 AM    RBC 3.18 06/27/2024 05:42 AM    HGB 8.1 06/27/2024 05:42 AM    HCT 24.8 06/27/2024 05:42 AM    MCV 78.1 06/27/2024 05:42 AM    MCH 25.3

## 2024-06-28 NOTE — PROGRESS NOTES
Pt noted with extreme discomfort with repositioning of RLE. Granddaughter states she has noticed patient with complaint of  hip pain and leg drawn up over the last two weeks. Pt is unable to verbalize if he has had trauma or a fall at the facility. Provider Meg Rothman  N.P. notified per family request. Per Provider N.O. xray of R- hip.

## 2024-06-28 NOTE — PLAN OF CARE
Problem: Discharge Planning  Goal: Discharge to home or other facility with appropriate resources  6/27/2024 2308 by Amie Yang RN  Outcome: Progressing  6/27/2024 1720 by Vickie Pace RN  Outcome: Progressing  6/27/2024 1714 by Vickie Pace RN  Outcome: Progressing     Problem: Skin/Tissue Integrity  Goal: Absence of new skin breakdown  Description: 1.  Monitor for areas of redness and/or skin breakdown  2.  Assess vascular access sites hourly  3.  Every 4-6 hours minimum:  Change oxygen saturation probe site  4.  Every 4-6 hours:  If on nasal continuous positive airway pressure, respiratory therapy assess nares and determine need for appliance change or resting period.  6/27/2024 2308 by Amie Yang RN  Outcome: Progressing  6/27/2024 1720 by Vickie Pace RN  Outcome: Progressing  6/27/2024 1714 by Vickie Pace RN  Outcome: Progressing     Problem: Safety - Adult  Goal: Free from fall injury  6/27/2024 2308 by Amie Yang RN  Outcome: Progressing  6/27/2024 1720 by Vickie Pace RN  Outcome: Progressing  6/27/2024 1714 by Vickie Pace RN  Outcome: Progressing     Problem: Nutrition Deficit:  Goal: Optimize nutritional status  6/27/2024 2308 by Amie Yang RN  Outcome: Progressing  6/27/2024 1720 by Vickie Pace RN  Outcome: Progressing  6/27/2024 1714 by Vickie Pace RN  Outcome: Progressing     Problem: Pain  Goal: Verbalizes/displays adequate comfort level or baseline comfort level  6/27/2024 2308 by Amie Yang RN  Outcome: Progressing  6/27/2024 1720 by Vickie Pace RN  Outcome: Progressing  6/27/2024 1714 by Vickie Pace RN  Outcome: Progressing     Problem: ABCDS Injury Assessment  Goal: Absence of physical injury  6/27/2024 2308 by Amie Yang RN  Outcome: Progressing  6/27/2024 1720 by Vickie Pace RN  Outcome: Progressing  6/27/2024 1714 by Vickie Pace RN  Outcome:

## 2024-06-28 NOTE — PROGRESS NOTES
Comprehensive Nutrition Assessment    Type and Reason for Visit:  Reassess    Nutrition Recommendations/Plan:   Continue Dysphagia soft and bite sized; MARGO  Continue ONS wound healing once per day  Continue ONS Renal TID   Consider alternative nutrition if PO intake does not improve by day 10      Malnutrition Assessment:  Malnutrition Status:  At risk for malnutrition (Comment) (06/28/24 1279)    Context:  Acute Illness     Findings of the 6 clinical characteristics of malnutrition:  Energy Intake:  Mild decrease in energy intake (Comment)  Weight Loss:  Unable to assess     Body Fat Loss:  No significant body fat loss     Muscle Mass Loss:  Mild muscle mass loss Temples (temporalis)  Fluid Accumulation:  No significant fluid accumulation     Strength:  Not Performed    Nutrition Assessment:    ONS modified to Renal supplement per nephrology s/p hyperkalemia (5.2) this AM. Pt tolerating ~% intake of ONS. Poor intake at meal times average 26-50%. Will continue to monitor progress while inpatient.    Nutrition Related Findings:    K 5.2, BUN 54, Creat 3.5, eGFR 17. altered iron labs 6/25. LBM 6/27 Wound Type: Pressure Injury, Multiple, Unstageable, Stage III       Current Nutrition Intake & Therapies:    Average Meal Intake: 26-50%  Average Supplements Intake: %  ADULT ORAL NUTRITION SUPPLEMENT; Dinner; Wound Healing Oral Supplement  ADULT DIET; Dysphagia - Soft and Bite Sized; No Added Salt (3-4 gm)  ADULT ORAL NUTRITION SUPPLEMENT; Breakfast, Lunch, Dinner; Renal Oral Supplement    Anthropometric Measures:  Height: 182.9 cm (6' 0.01\")  Ideal Body Weight (IBW): 178 lbs (81 kg)       Current Body Weight: 71 kg (156 lb 8.4 oz), 87.9 % IBW. Weight Source: Bed Scale  Current BMI (kg/m2): 21.2        Weight Adjustment For: No Adjustment                 BMI Categories: Underweight (BMI less than 22) age over 65    Estimated Daily Nutrient Needs:  Energy Requirements Based On: Kcal/kg (28-32kcal/kg)  Weight  Used for Energy Requirements: Usual  Energy (kcal/day): 1988-2272  Weight Used for Protein Requirements: Usual  Protein (g/day): 71-99  Method Used for Fluid Requirements: 1 ml/kcal  Fluid (ml/day): 220ml    Nutrition Diagnosis:   Inadequate oral intake related to inadequate protein-energy intake as evidenced by intake 26-50%    Nutrition Interventions:   Food and/or Nutrient Delivery: Continue Current Diet, Continue Oral Nutrition Supplement  Nutrition Education/Counseling: Education not indicated  Coordination of Nutrition Care: Continue to monitor while inpatient  Plan of Care discussed with: pt    Goals:  Previous Goal Met: Progressing toward Goal(s)  Goals: PO intake 50% or greater, within 2 days       Nutrition Monitoring and Evaluation:   Behavioral-Environmental Outcomes: None Identified  Food/Nutrient Intake Outcomes: Supplement Intake, Food and Nutrient Intake  Physical Signs/Symptoms Outcomes: Biochemical Data, Meal Time Behavior, Nutrition Focused Physical Findings, Weight, Skin    Discharge Planning:    Too soon to determine     VANDANA BAUM, MS, RD, LD  Contact: [phone]

## 2024-06-28 NOTE — CARE COORDINATION
6/28 Call received from [facility] can accept patient. Son Sanjay notified. Son is currently considering short term dialysis if necessary. Patient is from [facility]. Son wants a different LTC facility and he wants Hospice from [facility] after patient returns to LTC. Son requests referral to Home at [facility] as well. Referral sent.  Electronically signed by Tracy Garcia RN on 6/28/2024 at 4:25 PM

## 2024-06-28 NOTE — PROGRESS NOTES
V2.0  [facility] Hospitalist Progress Note      Name:  Reji Kaufman /Age/Sex: 1940  (83 y.o. male)   MRN & CSN:  [id number] & [id number] Encounter Date/Time: 2024 2:35 PM EDT    Location:  [place] [address]- PCP: Luis Felipe Jackson MD       Hospital Day: 6      Subjective:     Chief Complaint:  Altered Mental Status (Pt family states pt is not acting right and may have a UTI. Dementia at baseline with a chronic corado. )     Pt was complaining of right hip pain, X-ray obtained no acute fracture.   Patient seen and examined at bedside. Alert, has no new complaints or concerns.           Assessment and Plan:   Severe sepsis. Initially suspected 2/2 UTI. P/w leukocytosis, tachycardia. LA 3.4.   UA suggestive of infection but urine cx negative. Blood cx negative so far.   Has chronic corado.   Recent prior hx ESBL   Has been on merrem completed now  Leukocytosis not resolving likely due to cancer.    ID managing.     acute metabolic encephalopathy. Likely 2/2 sepsis.   Now coming back to baseline.     EVELINA on CKD. Baseline Cr 1.9 came w 3.5. still not resolving despite IVF  Has chronic corado  - nephro managing  - on IVF  - avoid nephrotoxins  - pt may need dialysis if not improving; nephro to discuss with son given pt is going for hospice     Essential HTN. BP stable. Resumed home meds  Hydralazine added     Acute on chronic anemia.  Suspect anemia of chronic disease given CKD  Hgb dropped to 6.8 s/p 1u pRBC .   No sign of bleeding  Fe 21, but ferritin >1000.   Monitor CBC daily    Goals of care. Family interested in hospice.   Code status changed to DNR-CCA as above.       Personally reviewed Lab Studies and Imaging     Drugs that require monitoring for toxicity include merrem and the method of monitoring was cbc      Diet ADULT ORAL NUTRITION SUPPLEMENT; Dinner; Wound Healing Oral Supplement  ADULT DIET; Dysphagia - Soft and Bite Sized; No Added Salt (3-4 gm)  ADULT ORAL NUTRITION SUPPLEMENT; Breakfast, Lunch,  imaged paranasal sinuses and mastoid air cells appear unremarkable. SOFT TISSUES/SKULL:  There is atherosclerotic calcification of the cavernous carotid arteries.  No displaced calvarial fracture.  Soft tissue structures at the axial level of the skull base appear grossly unremarkable.     No acute intracranial abnormality.       Electronically signed by Dipak De La Garza MD on 6/28/2024 at 11:38 AM

## 2024-06-29 LAB
ALBUMIN SERPL-MCNC: 2.5 G/DL (ref 3.4–5)
ANION GAP SERPL CALCULATED.3IONS-SCNC: 11 MMOL/L (ref 3–16)
BASOPHILS # BLD: 0.2 K/UL (ref 0–0.2)
BASOPHILS NFR BLD: 1.4 %
BUN SERPL-MCNC: 62 MG/DL (ref 7–20)
CALCIUM SERPL-MCNC: 9.6 MG/DL (ref 8.3–10.6)
CHLORIDE SERPL-SCNC: 101 MMOL/L (ref 99–110)
CO2 SERPL-SCNC: 25 MMOL/L (ref 21–32)
CREAT SERPL-MCNC: 3.4 MG/DL (ref 0.8–1.3)
DEPRECATED RDW RBC AUTO: 20 % (ref 12.4–15.4)
EOSINOPHIL # BLD: 0.2 K/UL (ref 0–0.6)
EOSINOPHIL NFR BLD: 1.5 %
GFR SERPLBLD CREATININE-BSD FMLA CKD-EPI: 17 ML/MIN/{1.73_M2}
GLUCOSE SERPL-MCNC: 97 MG/DL (ref 70–99)
HCT VFR BLD AUTO: 24.7 % (ref 40.5–52.5)
HGB BLD-MCNC: 8 G/DL (ref 13.5–17.5)
LYMPHOCYTES # BLD: 0.9 K/UL (ref 1–5.1)
LYMPHOCYTES NFR BLD: 7 %
MCH RBC QN AUTO: 25.2 PG (ref 26–34)
MCHC RBC AUTO-ENTMCNC: 32.4 G/DL (ref 31–36)
MCV RBC AUTO: 77.7 FL (ref 80–100)
MONOCYTES # BLD: 1.2 K/UL (ref 0–1.3)
MONOCYTES NFR BLD: 9.8 %
NEUTROPHILS # BLD: 10.2 K/UL (ref 1.7–7.7)
NEUTROPHILS NFR BLD: 80.3 %
PHOSPHATE SERPL-MCNC: 3.8 MG/DL (ref 2.5–4.9)
PLATELET # BLD AUTO: 185 K/UL (ref 135–450)
PMV BLD AUTO: 8.4 FL (ref 5–10.5)
POTASSIUM SERPL-SCNC: 4.8 MMOL/L (ref 3.5–5.1)
PROCALCITONIN SERPL IA-MCNC: 1.13 NG/ML (ref 0–0.15)
RBC # BLD AUTO: 3.17 M/UL (ref 4.2–5.9)
SODIUM SERPL-SCNC: 137 MMOL/L (ref 136–145)
WBC # BLD AUTO: 12.7 K/UL (ref 4–11)

## 2024-06-29 PROCEDURE — 6370000000 HC RX 637 (ALT 250 FOR IP): Performed by: HOSPITALIST

## 2024-06-29 PROCEDURE — 6370000000 HC RX 637 (ALT 250 FOR IP): Performed by: INTERNAL MEDICINE

## 2024-06-29 PROCEDURE — 94760 N-INVAS EAR/PLS OXIMETRY 1: CPT

## 2024-06-29 PROCEDURE — 36415 COLL VENOUS BLD VENIPUNCTURE: CPT

## 2024-06-29 PROCEDURE — 85025 COMPLETE CBC W/AUTO DIFF WBC: CPT

## 2024-06-29 PROCEDURE — 80069 RENAL FUNCTION PANEL: CPT

## 2024-06-29 PROCEDURE — 2580000003 HC RX 258: Performed by: INTERNAL MEDICINE

## 2024-06-29 PROCEDURE — 2580000003 HC RX 258: Performed by: HOSPITALIST

## 2024-06-29 PROCEDURE — 99232 SBSQ HOSP IP/OBS MODERATE 35: CPT | Performed by: INTERNAL MEDICINE

## 2024-06-29 PROCEDURE — 1200000000 HC SEMI PRIVATE

## 2024-06-29 PROCEDURE — 6360000002 HC RX W HCPCS: Performed by: INTERNAL MEDICINE

## 2024-06-29 PROCEDURE — 84145 PROCALCITONIN (PCT): CPT

## 2024-06-29 RX ORDER — HYDRALAZINE HYDROCHLORIDE 50 MG/1
100 TABLET, FILM COATED ORAL EVERY 8 HOURS SCHEDULED
Status: DISCONTINUED | OUTPATIENT
Start: 2024-06-29 | End: 2024-07-01 | Stop reason: HOSPADM

## 2024-06-29 RX ADMIN — SENNOSIDES 8.6 MG: 8.6 TABLET, FILM COATED ORAL at 21:13

## 2024-06-29 RX ADMIN — HYDRALAZINE HYDROCHLORIDE 100 MG: 50 TABLET ORAL at 21:13

## 2024-06-29 RX ADMIN — TAMSULOSIN HYDROCHLORIDE 0.4 MG: 0.4 CAPSULE ORAL at 10:28

## 2024-06-29 RX ADMIN — FINASTERIDE 5 MG: 5 TABLET, FILM COATED ORAL at 10:31

## 2024-06-29 RX ADMIN — SODIUM CHLORIDE, PRESERVATIVE FREE 10 ML: 5 INJECTION INTRAVENOUS at 09:00

## 2024-06-29 RX ADMIN — Medication 6 MG: at 21:13

## 2024-06-29 RX ADMIN — FLUTICASONE PROPIONATE 1 SPRAY: 50 SPRAY, METERED NASAL at 10:32

## 2024-06-29 RX ADMIN — MIRTAZAPINE 7.5 MG: 15 TABLET, FILM COATED ORAL at 21:13

## 2024-06-29 RX ADMIN — NIFEDIPINE 30 MG: 30 TABLET, EXTENDED RELEASE ORAL at 10:28

## 2024-06-29 RX ADMIN — METOPROLOL TARTRATE 25 MG: 25 TABLET, FILM COATED ORAL at 10:28

## 2024-06-29 RX ADMIN — HYDRALAZINE HYDROCHLORIDE 50 MG: 50 TABLET ORAL at 06:14

## 2024-06-29 RX ADMIN — HYDRALAZINE HYDROCHLORIDE 50 MG: 50 TABLET ORAL at 14:49

## 2024-06-29 RX ADMIN — MEROPENEM 500 MG: 500 INJECTION, POWDER, FOR SOLUTION INTRAVENOUS at 21:12

## 2024-06-29 RX ADMIN — METOPROLOL TARTRATE 25 MG: 25 TABLET, FILM COATED ORAL at 21:13

## 2024-06-29 RX ADMIN — MEROPENEM 500 MG: 500 INJECTION, POWDER, FOR SOLUTION INTRAVENOUS at 10:30

## 2024-06-29 ASSESSMENT — PAIN SCALES - GENERAL
PAINLEVEL_OUTOF10: 0

## 2024-06-29 ASSESSMENT — PAIN SCALES - WONG BAKER
WONGBAKER_NUMERICALRESPONSE: NO HURT
WONGBAKER_NUMERICALRESPONSE: NO HURT

## 2024-06-29 NOTE — PROGRESS NOTES
[facility]  Phone: [phone]  Fax: [phone]  [place]         Reason for Consult: EVELINA on CKD    Requesting Physician:  Dr. Fields    History Obtained From: electronic medical record    History of Present Ilness: 83-year-old -American gentleman who was sent from the nursing home with increasing confusion  History obtained from the medical records as patient is unable to provide any reliable information he has history of EVELINA in May 2024 in the setting of sepsis and urinary tract infection his creatinine improved to 1.8 mg at the time of discharge from a peak of 2.8 mg  He has history of metastatic prostate cancer hypertension dementia and chronic indwelling Corado catheter his previous cultures have grown ESBL Klebsiella in the urine and Enterococcus faecalis as well in May 2024  On presentation is noted to have a creatinine of 3.5 mg  He was hypertensive and tachycardic with a temperature of 100.2 degrees  Patient was started on meropenem and vancomycin  We are asked to see him for his EVELINA on CKD          Review of Systems:  no acute events overnight  Awake answering simple questions    Denies abd pain nausea SOB           Physical exam:   Constitutional:  VITALS:  BP (!) 172/73   Pulse 100   Temp 99.1 °F (37.3 °C) (Oral)   Resp 14   Ht 1.829 m (6' 0.01\")   Wt 87 kg (191 lb 12.8 oz)   SpO2 97%   BMI 26.01 kg/m²   Gen: alert, awake,confused  Skin: no rash, turgor decreased  Heent:  eomi, mmm  Neck: no bruits or jvd noted, thyroid normal  Cardiovascular:  S1, S2 without m/r/g tachycardic  Respiratory: CTA B without w/r/r; respiratory effort normal  Abdomen:  +bs, soft, nt, nd, corado in place  Ext: no lower extremity edema    Data/  CBC:   Lab Results   Component Value Date/Time    WBC 12.7 06/29/2024 05:08 AM    RBC 3.17 06/29/2024 05:08 AM    HGB 8.0 06/29/2024 05:08 AM    HCT 24.7 06/29/2024 05:08 AM    MCV 77.7 06/29/2024 05:08 AM    MCH 25.2 06/29/2024 05:08 AM

## 2024-06-29 NOTE — PLAN OF CARE
Problem: Discharge Planning  Goal: Discharge to home or other facility with appropriate resources  6/28/2024 2322 by Amie Yang RN  Outcome: Progressing  6/28/2024 2322 by Amie Yang RN  Outcome: Progressing  6/28/2024 1102 by Meaghan Resendez RN  Outcome: Progressing     Problem: Skin/Tissue Integrity  Goal: Absence of new skin breakdown  Description: 1.  Monitor for areas of redness and/or skin breakdown  2.  Assess vascular access sites hourly  3.  Every 4-6 hours minimum:  Change oxygen saturation probe site  4.  Every 4-6 hours:  If on nasal continuous positive airway pressure, respiratory therapy assess nares and determine need for appliance change or resting period.  6/28/2024 2322 by Amie Yang RN  Outcome: Progressing  6/28/2024 2322 by Amie Yang RN  Outcome: Progressing  6/28/2024 1102 by Meaghna Resendez RN  Outcome: Progressing     Problem: Safety - Adult  Goal: Free from fall injury  6/28/2024 2322 by Amie Yang RN  Outcome: Progressing  6/28/2024 1102 by Meaghan Resendez RN  Outcome: Progressing     Problem: Nutrition Deficit:  Goal: Optimize nutritional status  6/28/2024 2322 by Amie Yang RN  Outcome: Progressing  6/28/2024 1600 by Lara William, MS, RD, LD  Outcome: Progressing  Flowsheets (Taken 6/28/2024 1600)  Nutrient intake appropriate for improving, restoring, or maintaining nutritional needs:   Assess nutritional status and recommend course of action   Monitor oral intake, labs, and treatment plans   Recommend appropriate diets, oral nutritional supplements, and vitamin/mineral supplements  6/28/2024 1102 by Meaghan Resendez RN  Outcome: Progressing     Problem: Pain  Goal: Verbalizes/displays adequate comfort level or baseline comfort level  6/28/2024 2322 by Amie Yang RN  Outcome: Progressing  6/28/2024 1102 by Meaghan Resendez RN  Outcome: Progressing     Problem: ABCDS Injury Assessment  Goal: Absence of physical injury  6/28/2024 2322  by Amie Yang, RN  Outcome: Progressing  6/28/2024 1102 by Meaghan Resendez, RN  Outcome: Progressing

## 2024-06-29 NOTE — PROGRESS NOTES
Infectious Diseases Inpatient Progress Note      CHIEF COMPLAINT:     Change in Mentation  ESBL UTI  Rose  EVELINA ON CKD  Wbc ELEVATION   Prostate cancer      HISTORY OF PRESENT ILLNESS:  83 y.o. old man with history of dementia admitted from nursing facility secondary  increasing confusion. Patient has a chronic Rose catheter in place for obstructive symptoms secondary to prostate issues. He was diagnosed to have urinary retention now has long term  Rose catheter placement as well as a diagnosed metastatic prostate cancer. He is following with oncology [facility] was recently started on Lupron injection. He was recently admitted to [facility] with sepsis secondary to Rose catheter associated urinary tract infection Klebsiella ESBL bacteremia.  He finished a long course of IV antibiotics at the nursing facility and his PICC line has been removed.  On admission he was found to have fever Tmax 100.2 WBC elevated creatinine 3.5 blood culture in process urine culture abnormal urine culture in process hepatitis panel negative, we are consulted for IV antibiotic recommendations    Interval history: More awake Rose catheter working well creatinine remains elevated WBC slowly trending down tolerating antibiotic okay okay  Past Medical History:    Past Medical History:   Diagnosis Date    Alcohol abuse     AV block, 1st degree     Hypertension        Past Surgical History:    Past Surgical History:   Procedure Laterality Date    COLONOSCOPY N/A 5/21/2024    COLONOSCOPY performed by Jose Radford MD at [facility] ENDOSCOPY    UPPER GASTROINTESTINAL ENDOSCOPY N/A 5/21/2024    ESOPHAGOGASTRODUODENOSCOPY performed by Jose Radford MD at [facility] ENDOSCOPY       Current Medications:    Outpatient Medications Marked as Taking for the 6/23/24 encounter (Hospital Encounter)   Medication Sig Dispense Refill    oxyCODONE (ROXICODONE) 5 MG immediate release tablet Take 1 tablet by mouth every 6 hours as needed for Pain.  results should be treated as presumptive and,  if inconsistent with clinical signs and symptoms or necessary for  patient management, should be tested with an alternative molecular  assay. Negative results do not preclude SARS-CoV-2 infection and  should not be used as the sole basis for patient management decisions.  This test has been authorized by the FDA under an Emergency Use  Authorization (EUA) for use by authorized laboratories.    Fact sheet for Healthcare Providers:  https://www.fda.gov/media/874798/download  Fact sheet for Patients: https://www.fda.gov/media/997877/download    METHODOLOGY: Isothermal Nucleic Acid Amplification      Rapid influenza A/B antigens [[id number]] Collected: 06/23/24 2355   Order Status: Completed Specimen: Nasopharyngeal Updated: 06/24/24 0038    Rapid Influenza A Ag Negative    Rapid Influenza B Ag Negative   Culture, Urine [[id number]] Collected: 06/23/24 2319   Order Status: No result Updated: 06/23/24 2319   Culture, Blood 2 [[id number]] Collected: 06/23/24 2046   Order Status: Sent Specimen: Blood Updated: 06/23/24 2100   Culture, Blood 1 [[id number]] Collected: 06/23/2     Blood Culture:   Lab Results   Component Value Date/Time    BC No Growth after 4 days of incubation. 06/23/2024 08:46 PM    BLOODCULT2 No Growth after 4 days of incubation. 06/23/2024 08:46 PM       Culture, Blood 2 [[id number]] (Abnormal) Collected: 05/15/24 0227   Order Status: Completed Specimen: Blood Updated: 05/17/24 0616    Organism Klebsiella pneumoniae ESBL Abnormal     Culture, Blood 2 -- Abnormal     POSITIVE for  No further workup  CONTACT PRECAUTIONS INDICATED  Carbapenem antibiotics are the best option for infections caused  by ESBL producing organisms.  Other antibiotic classes are  likely to result in treatment failure, even for organisms  demonstrating in vitro susceptibility.  Isolated two of two sets  Refer to culture [id number] for sensitivity results       Culture, Urine [[id number]]  toxicity.     Thanks for allowing me to participate in your patient's care please call me with any questions or concerns.    Dr. Dorys Quiñones MD  Infectious Disease  [facility]  Phone: [phone]   Fax : [phone]

## 2024-06-29 NOTE — PROGRESS NOTES
V2.0  [facility] Hospitalist Progress Note      Name:  Reji Kaufman /Age/Sex: 1940  (83 y.o. male)   MRN & CSN:  [id number] & [id number] Encounter Date/Time: 2024 2:35 PM EDT    Location:  [address]- PCP: Luis Felipe Jackson MD       Hospital Day: 7      Subjective:     Chief Complaint:  Altered Mental Status (Pt family states pt is not acting right and may have a UTI. Dementia at baseline with a chronic corado. )     Patient seen and examined at bedside. Alert, has no new complaints or concerns.        Assessment and Plan:   Severe sepsis. Initially suspected 2/2 UTI. P/w leukocytosis, tachycardia. LA 3.4.   UA suggestive of infection but urine cx negative. Blood cx negative so far.   Has chronic corado.   Recent prior hx ESBL   Has been on merrem restarted by ID   Leukocytosis not resolving could be 2/2 cancer?  Procalc noted 1.13 which could from renal failure?  ID managing.     acute metabolic encephalopathy. Likely 2/2 sepsis.   Now coming back to baseline.     EVELINA on CKD. Baseline Cr 1.9 came w 3.5. still not resolving despite IVF  Has chronic corado  Today Cr 3.4, K 4.8  - nephro managing  - on IVF  - avoid nephrotoxins  - pt may need dialysis if not improving; son agreeable to temporary dialysis     Essential HTN. BP stable. Resumed home meds  Hydralazine added     Acute on chronic anemia.  Suspect anemia of chronic disease given CKD  Hgb dropped to 6.8 s/p 1u pRBC .   No sign of bleeding  Fe 21, but ferritin >1000.   Monitor CBC daily    Metastatic prostate cancer. Follows outpatient.    Goals of care. Family interested in hospice.   Code status changed to DNR-CCA as above.       Personally reviewed Lab Studies and Imaging     Drugs that require monitoring for toxicity include merrem and the method of monitoring was cbc      Diet ADULT ORAL NUTRITION SUPPLEMENT; Dinner; Wound Healing Oral Supplement  ADULT DIET; Dysphagia - Soft and Bite Sized; No Added Salt (3-4 gm)  ADULT ORAL NUTRITION  6/23/2024 7:24 pm TECHNIQUE: CT of the head was performed without the administration of intravenous contrast. Automated exposure control, iterative reconstruction, and/or weight based adjustment of the mA/kV was utilized to reduce the radiation dose to as low as reasonably achievable. COMPARISON: 05/14/2024 HISTORY: ORDERING SYSTEM PROVIDED HISTORY: ams TECHNOLOGIST PROVIDED HISTORY: Has a \"code stroke\" or \"stroke alert\" been called?->No Reason for exam:->ams Reason for Exam: ams FINDINGS: BRAIN/VENTRICLES: Moderate stable cerebral volume loss.  Periventricular, deep, and subcortical white matter hypodensity noted consistent with stable significant small vessel disease.  No intracranial hemorrhage, midline shift, or mass effect. ORBITS: The orbits/globes appear grossly unremarkable. SINUSES: The imaged paranasal sinuses and mastoid air cells appear unremarkable. SOFT TISSUES/SKULL:  There is atherosclerotic calcification of the cavernous carotid arteries.  No displaced calvarial fracture.  Soft tissue structures at the axial level of the skull base appear grossly unremarkable.     No acute intracranial abnormality.       Electronically signed by Dipak De La Garza MD on 6/29/2024 at 9:54 AM

## 2024-06-30 LAB
ALBUMIN SERPL-MCNC: 2.8 G/DL (ref 3.4–5)
ANION GAP SERPL CALCULATED.3IONS-SCNC: 13 MMOL/L (ref 3–16)
BASOPHILS # BLD: 0.1 K/UL (ref 0–0.2)
BASOPHILS NFR BLD: 0.6 %
BUN SERPL-MCNC: 61 MG/DL (ref 7–20)
CALCIUM SERPL-MCNC: 9.6 MG/DL (ref 8.3–10.6)
CHLORIDE SERPL-SCNC: 101 MMOL/L (ref 99–110)
CO2 SERPL-SCNC: 24 MMOL/L (ref 21–32)
CREAT SERPL-MCNC: 3.8 MG/DL (ref 0.8–1.3)
DEPRECATED RDW RBC AUTO: 21 % (ref 12.4–15.4)
EOSINOPHIL # BLD: 0.1 K/UL (ref 0–0.6)
EOSINOPHIL NFR BLD: 0.7 %
GFR SERPLBLD CREATININE-BSD FMLA CKD-EPI: 15 ML/MIN/{1.73_M2}
GLUCOSE SERPL-MCNC: 111 MG/DL (ref 70–99)
HCT VFR BLD AUTO: 26 % (ref 40.5–52.5)
HGB BLD-MCNC: 8.4 G/DL (ref 13.5–17.5)
LYMPHOCYTES # BLD: 1.1 K/UL (ref 1–5.1)
LYMPHOCYTES NFR BLD: 7.7 %
MCH RBC QN AUTO: 25.1 PG (ref 26–34)
MCHC RBC AUTO-ENTMCNC: 32.1 G/DL (ref 31–36)
MCV RBC AUTO: 78 FL (ref 80–100)
MONOCYTES # BLD: 1.1 K/UL (ref 0–1.3)
MONOCYTES NFR BLD: 7.9 %
NEUTROPHILS # BLD: 11.6 K/UL (ref 1.7–7.7)
NEUTROPHILS NFR BLD: 83.1 %
PHOSPHATE SERPL-MCNC: 4 MG/DL (ref 2.5–4.9)
PLATELET # BLD AUTO: 228 K/UL (ref 135–450)
PMV BLD AUTO: 8.6 FL (ref 5–10.5)
POTASSIUM SERPL-SCNC: 4.8 MMOL/L (ref 3.5–5.1)
RBC # BLD AUTO: 3.33 M/UL (ref 4.2–5.9)
SODIUM SERPL-SCNC: 138 MMOL/L (ref 136–145)
WBC # BLD AUTO: 13.9 K/UL (ref 4–11)

## 2024-06-30 PROCEDURE — 36415 COLL VENOUS BLD VENIPUNCTURE: CPT

## 2024-06-30 PROCEDURE — 6360000002 HC RX W HCPCS: Performed by: STUDENT IN AN ORGANIZED HEALTH CARE EDUCATION/TRAINING PROGRAM

## 2024-06-30 PROCEDURE — 99232 SBSQ HOSP IP/OBS MODERATE 35: CPT | Performed by: INTERNAL MEDICINE

## 2024-06-30 PROCEDURE — 6370000000 HC RX 637 (ALT 250 FOR IP): Performed by: INTERNAL MEDICINE

## 2024-06-30 PROCEDURE — 80069 RENAL FUNCTION PANEL: CPT

## 2024-06-30 PROCEDURE — 1200000000 HC SEMI PRIVATE

## 2024-06-30 PROCEDURE — 6370000000 HC RX 637 (ALT 250 FOR IP): Performed by: HOSPITALIST

## 2024-06-30 PROCEDURE — 85025 COMPLETE CBC W/AUTO DIFF WBC: CPT

## 2024-06-30 PROCEDURE — 6360000002 HC RX W HCPCS: Performed by: INTERNAL MEDICINE

## 2024-06-30 PROCEDURE — 94760 N-INVAS EAR/PLS OXIMETRY 1: CPT

## 2024-06-30 PROCEDURE — 2580000003 HC RX 258: Performed by: INTERNAL MEDICINE

## 2024-06-30 RX ORDER — ENOXAPARIN SODIUM 100 MG/ML
30 INJECTION SUBCUTANEOUS DAILY
Status: DISCONTINUED | OUTPATIENT
Start: 2024-06-30 | End: 2024-07-01 | Stop reason: HOSPADM

## 2024-06-30 RX ORDER — HYDRALAZINE HYDROCHLORIDE 100 MG/1
100 TABLET, FILM COATED ORAL EVERY 8 HOURS SCHEDULED
Qty: 90 TABLET | Refills: 3 | Status: SHIPPED | OUTPATIENT
Start: 2024-06-30

## 2024-06-30 RX ADMIN — MIRTAZAPINE 7.5 MG: 15 TABLET, FILM COATED ORAL at 20:50

## 2024-06-30 RX ADMIN — METOPROLOL TARTRATE 25 MG: 25 TABLET, FILM COATED ORAL at 20:50

## 2024-06-30 RX ADMIN — METOPROLOL TARTRATE 25 MG: 25 TABLET, FILM COATED ORAL at 08:49

## 2024-06-30 RX ADMIN — NIFEDIPINE 30 MG: 30 TABLET, EXTENDED RELEASE ORAL at 08:48

## 2024-06-30 RX ADMIN — FLUTICASONE PROPIONATE 1 SPRAY: 50 SPRAY, METERED NASAL at 08:49

## 2024-06-30 RX ADMIN — Medication 6 MG: at 20:50

## 2024-06-30 RX ADMIN — HYDRALAZINE HYDROCHLORIDE 100 MG: 50 TABLET ORAL at 13:08

## 2024-06-30 RX ADMIN — ENOXAPARIN SODIUM 30 MG: 100 INJECTION SUBCUTANEOUS at 13:07

## 2024-06-30 RX ADMIN — TAMSULOSIN HYDROCHLORIDE 0.4 MG: 0.4 CAPSULE ORAL at 08:48

## 2024-06-30 RX ADMIN — SENNOSIDES 8.6 MG: 8.6 TABLET, FILM COATED ORAL at 20:50

## 2024-06-30 RX ADMIN — HYDRALAZINE HYDROCHLORIDE 100 MG: 50 TABLET ORAL at 05:18

## 2024-06-30 RX ADMIN — FINASTERIDE 5 MG: 5 TABLET, FILM COATED ORAL at 08:49

## 2024-06-30 RX ADMIN — HYDRALAZINE HYDROCHLORIDE 100 MG: 50 TABLET ORAL at 20:52

## 2024-06-30 RX ADMIN — ACETAMINOPHEN 325MG 650 MG: 325 TABLET ORAL at 18:48

## 2024-06-30 RX ADMIN — MEROPENEM 500 MG: 500 INJECTION, POWDER, FOR SOLUTION INTRAVENOUS at 08:49

## 2024-06-30 ASSESSMENT — PAIN SCALES - WONG BAKER
WONGBAKER_NUMERICALRESPONSE: NO HURT

## 2024-06-30 ASSESSMENT — PAIN DESCRIPTION - ORIENTATION: ORIENTATION: RIGHT

## 2024-06-30 ASSESSMENT — PAIN SCALES - GENERAL
PAINLEVEL_OUTOF10: 0
PAINLEVEL_OUTOF10: 0
PAINLEVEL_OUTOF10: 4
PAINLEVEL_OUTOF10: 0
PAINLEVEL_OUTOF10: 0

## 2024-06-30 ASSESSMENT — PAIN DESCRIPTION - LOCATION: LOCATION: LEG

## 2024-06-30 NOTE — PROGRESS NOTES
Infectious Diseases Inpatient Progress Note      CHIEF COMPLAINT:     Change in Mentation  ESBL UTI  Rose  EVELINA ON CKD  Wbc ELEVATION   Prostate cancer      HISTORY OF PRESENT ILLNESS:  83 y.o. old man with history of dementia admitted from nursing facility secondary  increasing confusion. Patient has a chronic Rose catheter in place for obstructive symptoms secondary to prostate issues. He was diagnosed to have urinary retention now has long term  Rose catheter placement as well as a diagnosed metastatic prostate cancer. He is following with oncology [facility] was recently started on Lupron injection. He was recently admitted to [facility] with sepsis secondary to Rose catheter associated urinary tract infection Klebsiella ESBL bacteremia.  He finished a long course of IV antibiotics at the nursing facility and his PICC line has been removed.  On admission he was found to have fever Tmax 100.2 WBC elevated creatinine 3.5 blood culture in process urine culture abnormal urine culture in process hepatitis panel negative, we are consulted for IV antibiotic recommendations    Interval history: Rose in place WBC mild elevation no abd pain no fevers no chills     Past Medical History:    Past Medical History:   Diagnosis Date    Alcohol abuse     AV block, 1st degree     Hypertension        Past Surgical History:    Past Surgical History:   Procedure Laterality Date    COLONOSCOPY N/A 5/21/2024    COLONOSCOPY performed by Jose Radford MD at [facility] ENDOSCOPY    UPPER GASTROINTESTINAL ENDOSCOPY N/A 5/21/2024    ESOPHAGOGASTRODUODENOSCOPY performed by Jose Radford MD at [facility] ENDOSCOPY       Current Medications:    Outpatient Medications Marked as Taking for the 6/23/24 encounter (Hospital Encounter)   Medication Sig Dispense Refill    hydrALAZINE (APRESOLINE) 100 MG tablet Take 1 tablet by mouth every 8 hours 90 tablet 3    benzonatate (TESSALON) 100 MG capsule Take 1 capsule by mouth 3 times  resolved  Recurrent urinary tract infection  History of prostate cancer  EVELINA  on  CKD3b  Long-term Quiñonez catheter in place for bladder outlet symptoms  Catheter associated tract infection  Recent Klebsiella ESBL bacteremia  Nursing home resident  Metabolic encephalopathy  Fevers resolved   Nursing home resident  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    PLAN :  D/c  IV Meropenem x stop date x  6/30  Blood cx  so far negative  Trend WBC  Urine cx  negative  Quiñonez replaced  Contact isolation  WBC can be reactive from the Metastatic Prostate cancer       Procal 1.13  D/c when ok with primary     Will sign off           Discussed with patient/Family and Nursing discussed  d/w Primary team     Medical Decision Making:  The following items were considered in medical decision making:  Discussion of patient care with other providers  Reviewed clinical lab tests  Reviewed radiology tests  Reviewed other diagnostic tests/interventions  Independent review of radiologic images  Independent review of  Microbiology cultures and other micro tests reviewed     Risk of Complications/Morbidity: High      Illness(es)/ Infection present that pose threat to bodily function.   There is potential for severe exacerbation of infection/side effects of treatment.  Therapy requires intensive monitoring for antimicrobial agent toxicity.     Thanks for allowing me to participate in your

## 2024-06-30 NOTE — PLAN OF CARE
Problem: Discharge Planning  Goal: Discharge to home or other facility with appropriate resources  6/30/2024 0145 by Gael Alvarado RN  Outcome: Progressing  Flowsheets (Taken 6/29/2024 2022)  Discharge to home or other facility with appropriate resources:   Identify barriers to discharge with patient and caregiver   Arrange for needed discharge resources and transportation as appropriate   Identify discharge learning needs (meds, wound care, etc)  6/29/2024 1541 by Adis Mathews RN  Outcome: Progressing     Problem: Skin/Tissue Integrity  Goal: Absence of new skin breakdown  Description: 1.  Monitor for areas of redness and/or skin breakdown  2.  Assess vascular access sites hourly  3.  Every 4-6 hours minimum:  Change oxygen saturation probe site  4.  Every 4-6 hours:  If on nasal continuous positive airway pressure, respiratory therapy assess nares and determine need for appliance change or resting period.  6/30/2024 0145 by Gael Alvarado RN  Outcome: Progressing  6/29/2024 1541 by Adis Mathews RN  Outcome: Progressing     Problem: Safety - Adult  Goal: Free from fall injury  6/30/2024 0145 by Gael Alvarado RN  Outcome: Progressing  6/29/2024 1541 by Adis Mathews RN  Outcome: Progressing     Problem: Nutrition Deficit:  Goal: Optimize nutritional status  6/30/2024 0145 by Gael Alvarado RN  Outcome: Progressing  6/29/2024 1541 by Adis Mathews RN  Outcome: Progressing     Problem: Pain  Goal: Verbalizes/displays adequate comfort level or baseline comfort level  6/30/2024 0145 by Gael Alvarado RN  Outcome: Progressing  Flowsheets (Taken 6/29/2024 2022)  Verbalizes/displays adequate comfort level or baseline comfort level:   Encourage patient to monitor pain and request assistance   Assess pain using appropriate pain scale   Administer analgesics based on type and severity of pain and evaluate response   Implement non-pharmacological measures as appropriate and

## 2024-06-30 NOTE — PROGRESS NOTES
V2.0  [facility] [place] Hospitalist Progress Note      Name:  Reji Kaufman /Age/Sex: 1940  (83 y.o. male)   MRN & CSN:  [id number] & [id number] Encounter Date/Time: 2024 2:35 PM EDT    Location:  [address]- PCP: Luis Felipe Jackson MD       Hospital Day: 8      Subjective:     Chief Complaint:  Altered Mental Status (Pt family states pt is not acting right and may have a UTI. Dementia at baseline with a chronic corado. )     Patient seen and examined at bedside. Alert, has no new complaints or concerns.    Per ID and nephro, pt is cleared for discharge.     Assessment and Plan:   Severe sepsis. Initially suspected 2/2 UTI. P/w leukocytosis, tachycardia. LA 3.4.   UA suggestive of infection but urine cx negative. Blood cx negative so far.   Has chronic corado.   Recent prior hx ESBL   Has been on merrem restarted by ID . Discussed with ID, can be discharged no abx.   Leukocytosis not resolving could be 2/2 cancer?  Procalc noted 1.13 which could from renal failure?  ID managing.     acute metabolic encephalopathy. Likely 2/2 sepsis.   Now coming back to baseline.     EVELINA on CKD. Baseline Cr 1.9 came w 3.5. still not resolving despite IVF  Has chronic corado  Today Cr 3.8 stable. Making urine.   pt may need dialysis if not improving; son agreeable to temporary dialysis . Kidney is stable for now.   - nephro managing  - on IVF  NS 50   - avoid nephrotoxins    Essential HTN. BP stable. Resumed home meds  Hydralazine dose increased.     Acute on chronic anemia.  Suspect anemia of chronic disease given CKD  Hgb dropped to 6.8 s/p 1u pRBC .   No sign of bleeding  Fe 21, but ferritin >1000.   Monitor CBC daily    Metastatic prostate cancer. Follows outpatient.    Goals of care. Family interested in hospice.   Code status changed to DNR-CCA as above.       Personally reviewed Lab Studies and Imaging     Drugs that require monitoring for toxicity include merrem and the method of monitoring was cbc      Diet ADULT ORAL      Recent Results (from the past 24 hour(s))   CBC with Auto Differential    Collection Time: 06/30/24  6:10 AM   Result Value Ref Range    WBC 13.9 (H) 4.0 - 11.0 K/uL    RBC 3.33 (L) 4.20 - 5.90 M/uL    Hemoglobin 8.4 (L) 13.5 - 17.5 g/dL    Hematocrit 26.0 (L) 40.5 - 52.5 %    MCV 78.0 (L) 80.0 - 100.0 fL    MCH 25.1 (L) 26.0 - 34.0 pg    MCHC 32.1 31.0 - 36.0 g/dL    RDW 21.0 (H) 12.4 - 15.4 %    Platelets 228 135 - 450 K/uL    MPV 8.6 5.0 - 10.5 fL    Neutrophils % 83.1 %    Lymphocytes % 7.7 %    Monocytes % 7.9 %    Eosinophils % 0.7 %    Basophils % 0.6 %    Neutrophils Absolute 11.6 (H) 1.7 - 7.7 K/uL    Lymphocytes Absolute 1.1 1.0 - 5.1 K/uL    Monocytes Absolute 1.1 0.0 - 1.3 K/uL    Eosinophils Absolute 0.1 0.0 - 0.6 K/uL    Basophils Absolute 0.1 0.0 - 0.2 K/uL   Renal Function Panel    Collection Time: 06/30/24  6:10 AM   Result Value Ref Range    Sodium 138 136 - 145 mmol/L    Potassium 4.8 3.5 - 5.1 mmol/L    Chloride 101 99 - 110 mmol/L    CO2 24 21 - 32 mmol/L    Anion Gap 13 3 - 16    Glucose 111 (H) 70 - 99 mg/dL    BUN 61 (H) 7 - 20 mg/dL    Creatinine 3.8 (H) 0.8 - 1.3 mg/dL    Est, Glom Filt Rate 15 (A) >60    Calcium 9.6 8.3 - 10.6 mg/dL    Phosphorus 4.0 2.5 - 4.9 mg/dL    Albumin 2.8 (L) 3.4 - 5.0 g/dL        Imaging/Diagnostics Last 24 Hours   XR CHEST (2 VW)    Result Date: 6/23/2024  EXAMINATION: TWO XRAY VIEWS OF THE CHEST 6/23/2024 8:45 pm COMPARISON: Chest radiograph 05/22/2024. HISTORY: ORDERING SYSTEM PROVIDED HISTORY: ams TECHNOLOGIST PROVIDED HISTORY: Reason for exam:->ams Reason for Exam: ams FINDINGS: The cardiomediastinal silhouette is within normal limits.  Shallow inspiration.  Mild bibasilar opacities.  No pneumothorax, vascular congestion, consolidation, or pleural effusion is identified.  No acute osseous abnormality.     Shallow inspiration with mild bibasilar opacities likely representing atelectasis.     CT HEAD WO CONTRAST    Result Date: 6/23/2024  EXAMINATION: CT

## 2024-06-30 NOTE — PLAN OF CARE
Problem: Discharge Planning  Goal: Discharge to home or other facility with appropriate resources  6/30/2024 1110 by Adis Mathews RN  Outcome: Progressing  6/30/2024 0214 by Gael Alvarado RN  Outcome: Progressing  6/30/2024 0145 by Gael Alvarado RN  Outcome: Progressing  Flowsheets (Taken 6/29/2024 2022)  Discharge to home or other facility with appropriate resources:   Identify barriers to discharge with patient and caregiver   Arrange for needed discharge resources and transportation as appropriate   Identify discharge learning needs (meds, wound care, etc)     Problem: Skin/Tissue Integrity  Goal: Absence of new skin breakdown  Description: 1.  Monitor for areas of redness and/or skin breakdown  2.  Assess vascular access sites hourly  3.  Every 4-6 hours minimum:  Change oxygen saturation probe site  4.  Every 4-6 hours:  If on nasal continuous positive airway pressure, respiratory therapy assess nares and determine need for appliance change or resting period.  6/30/2024 1110 by Adis Mathews RN  Outcome: Progressing  6/30/2024 0214 by Gael Alvarado RN  Outcome: Progressing  6/30/2024 0145 by Gael Alvaraod RN  Outcome: Progressing     Problem: Safety - Adult  Goal: Free from fall injury  6/30/2024 1110 by Adis Mathews RN  Outcome: Progressing  6/30/2024 0214 by Gael Alvarado RN  Outcome: Progressing  6/30/2024 0145 by Gael Alvarado RN  Outcome: Progressing     Problem: Nutrition Deficit:  Goal: Optimize nutritional status  6/30/2024 1110 by Adis Mathews RN  Outcome: Progressing  6/30/2024 0214 by Gael Alvarado RN  Outcome: Progressing  6/30/2024 0145 by Gael Alvarado RN  Outcome: Progressing     Problem: Pain  Goal: Verbalizes/displays adequate comfort level or baseline comfort level  6/30/2024 1110 by Adis Mathews RN  Outcome: Progressing  6/30/2024 0214 by Gael Alvarado RN  Outcome: Progressing  6/30/2024 0145 by Jenny

## 2024-06-30 NOTE — CARE COORDINATION
JASON spoke with son Sanjay, informing of dc order. He was planning to go check at [facility] and [facility] today, and will get back with JASON on a preference.   He is already aware the [facility] did accept.   Will follow up with Home at [facility] and [facility] on the referral.    Johnny Saul LMSW, [facility] Social Work Case Management   Phone: [phone]  Fax: [phone]

## 2024-06-30 NOTE — PROGRESS NOTES
[facility]  Phone: [phone]  Fax: [phone]  [facility]         Reason for Consult: EVELINA on CKD    Requesting Physician:  Dr. Fields    History Obtained From: electronic medical record    History of Present Ilness: 83-year-old -American gentleman who was sent from the nursing home with increasing confusion  History obtained from the medical records as patient is unable to provide any reliable information he has history of EVELINA in May 2024 in the setting of sepsis and urinary tract infection his creatinine improved to 1.8 mg at the time of discharge from a peak of 2.8 mg  He has history of metastatic prostate cancer hypertension dementia and chronic indwelling Corado catheter his previous cultures have grown ESBL Klebsiella in the urine and Enterococcus faecalis as well in May 2024  On presentation is noted to have a creatinine of 3.5 mg  He was hypertensive and tachycardic with a temperature of 100.2 degrees  Patient was started on meropenem and vancomycin  We are asked to see him for his EVELINA on CKD          Review of Systems:  no acute events overnight  Awake answering simple questions    Denies abd pain nausea SOB   Cr fluctuating but overall stable           Physical exam:   Constitutional:  VITALS:  BP (!) 165/71   Pulse 91   Temp 98.4 °F (36.9 °C) (Oral)   Resp 16   Ht 1.829 m (6' 0.01\")   Wt 87.2 kg (192 lb 3.9 oz)   SpO2 98%   BMI 26.07 kg/m²   Gen: alert, awake,confused  Skin: no rash, turgor decreased  Heent:  eomi, mmm  Neck: no bruits or jvd noted, thyroid normal  Cardiovascular:  S1, S2 without m/r/g tachycardic  Respiratory: CTA B without w/r/r; respiratory effort normal  Abdomen:  +bs, soft, nt, nd, corado in place  Ext: no lower extremity edema    Data/  CBC:   Lab Results   Component Value Date/Time    WBC 13.9 06/30/2024 06:10 AM    RBC 3.33 06/30/2024 06:10 AM    HGB 8.4 06/30/2024 06:10 AM    HCT 26.0 06/30/2024 06:10 AM    MCV 78.0 06/30/2024 06:10 AM

## 2024-06-30 NOTE — PLAN OF CARE
Problem: Discharge Planning  Goal: Discharge to home or other facility with appropriate resources  6/30/2024 0214 by Gael Alvarado RN  Outcome: Progressing  6/30/2024 0145 by Gael Avlarado RN  Outcome: Progressing  Flowsheets (Taken 6/29/2024 2022)  Discharge to home or other facility with appropriate resources:   Identify barriers to discharge with patient and caregiver   Arrange for needed discharge resources and transportation as appropriate   Identify discharge learning needs (meds, wound care, etc)  6/29/2024 1541 by Adis Mathews RN  Outcome: Progressing     Problem: Skin/Tissue Integrity  Goal: Absence of new skin breakdown  Description: 1.  Monitor for areas of redness and/or skin breakdown  2.  Assess vascular access sites hourly  3.  Every 4-6 hours minimum:  Change oxygen saturation probe site  4.  Every 4-6 hours:  If on nasal continuous positive airway pressure, respiratory therapy assess nares and determine need for appliance change or resting period.  6/30/2024 0214 by Gael Alvarado RN  Outcome: Progressing  6/30/2024 0145 by Gael Alvarado RN  Outcome: Progressing  6/29/2024 1541 by Adis Mathews RN  Outcome: Progressing     Problem: Safety - Adult  Goal: Free from fall injury  6/30/2024 0214 by Gael Alvarado RN  Outcome: Progressing  6/30/2024 0145 by Gael Alvarado RN  Outcome: Progressing  6/29/2024 1541 by Adis Mathews RN  Outcome: Progressing     Problem: Nutrition Deficit:  Goal: Optimize nutritional status  6/30/2024 0214 by Gael Alvarado RN  Outcome: Progressing  6/30/2024 0145 by Gael Alvarado RN  Outcome: Progressing  6/29/2024 1541 by Adis Mathews RN  Outcome: Progressing     Problem: Pain  Goal: Verbalizes/displays adequate comfort level or baseline comfort level  6/30/2024 0214 by Gael Alvarado RN  Outcome: Progressing  6/30/2024 0145 by Gael Alvarado RN  Outcome: Progressing  Flowsheets (Taken 6/29/2024  2022)  Verbalizes/displays adequate comfort level or baseline comfort level:   Encourage patient to monitor pain and request assistance   Assess pain using appropriate pain scale   Administer analgesics based on type and severity of pain and evaluate response   Implement non-pharmacological measures as appropriate and evaluate response   Consider cultural and social influences on pain and pain management  6/29/2024 1541 by Adis Mathews RN  Outcome: Progressing     Problem: ABCDS Injury Assessment  Goal: Absence of physical injury  6/30/2024 0214 by Gael Alvarado RN  Outcome: Progressing  6/30/2024 0145 by Gael Alvarado RN  Outcome: Progressing  6/29/2024 1541 by Adis Mathews RN  Outcome: Progressing

## 2024-07-01 VITALS
RESPIRATION RATE: 16 BRPM | SYSTOLIC BLOOD PRESSURE: 177 MMHG | TEMPERATURE: 98.8 F | HEIGHT: 72 IN | BODY MASS INDEX: 25.98 KG/M2 | OXYGEN SATURATION: 97 % | HEART RATE: 88 BPM | DIASTOLIC BLOOD PRESSURE: 83 MMHG | WEIGHT: 191.8 LBS

## 2024-07-01 LAB
ALBUMIN SERPL-MCNC: 2.5 G/DL (ref 3.4–5)
ANION GAP SERPL CALCULATED.3IONS-SCNC: 15 MMOL/L (ref 3–16)
ANISOCYTOSIS BLD QL SMEAR: ABNORMAL
BASOPHILS # BLD: 0.1 K/UL (ref 0–0.2)
BASOPHILS NFR BLD: 1 %
BUN SERPL-MCNC: 68 MG/DL (ref 7–20)
CALCIUM SERPL-MCNC: 9.5 MG/DL (ref 8.3–10.6)
CHLORIDE SERPL-SCNC: 100 MMOL/L (ref 99–110)
CO2 SERPL-SCNC: 23 MMOL/L (ref 21–32)
CREAT SERPL-MCNC: 3.9 MG/DL (ref 0.8–1.3)
DEPRECATED RDW RBC AUTO: 21.1 % (ref 12.4–15.4)
EOSINOPHIL # BLD: 0.1 K/UL (ref 0–0.6)
EOSINOPHIL NFR BLD: 1 %
GFR SERPLBLD CREATININE-BSD FMLA CKD-EPI: 14 ML/MIN/{1.73_M2}
GLUCOSE SERPL-MCNC: 102 MG/DL (ref 70–99)
HCT VFR BLD AUTO: 23.2 % (ref 40.5–52.5)
HGB BLD-MCNC: 7.4 G/DL (ref 13.5–17.5)
HYPOCHROMIA BLD QL SMEAR: ABNORMAL
LYMPHOCYTES # BLD: 0.8 K/UL (ref 1–5.1)
LYMPHOCYTES NFR BLD: 6 %
MACROCYTES BLD QL SMEAR: ABNORMAL
MCH RBC QN AUTO: 25 PG (ref 26–34)
MCHC RBC AUTO-ENTMCNC: 32 G/DL (ref 31–36)
MCV RBC AUTO: 78.3 FL (ref 80–100)
METAMYELOCYTES NFR BLD MANUAL: 1 %
MICROCYTES BLD QL SMEAR: ABNORMAL
MONOCYTES # BLD: 0.7 K/UL (ref 0–1.3)
MONOCYTES NFR BLD: 5 %
NEUTROPHILS # BLD: 12.3 K/UL (ref 1.7–7.7)
NEUTROPHILS NFR BLD: 83 %
NEUTS BAND NFR BLD MANUAL: 3 % (ref 0–7)
PHOSPHATE SERPL-MCNC: 3.2 MG/DL (ref 2.5–4.9)
PLATELET # BLD AUTO: 189 K/UL (ref 135–450)
PMV BLD AUTO: 8.3 FL (ref 5–10.5)
POTASSIUM SERPL-SCNC: 4.8 MMOL/L (ref 3.5–5.1)
RBC # BLD AUTO: 2.96 M/UL (ref 4.2–5.9)
SLIDE REVIEW: ABNORMAL
SODIUM SERPL-SCNC: 138 MMOL/L (ref 136–145)
WBC # BLD AUTO: 14.1 K/UL (ref 4–11)

## 2024-07-01 PROCEDURE — 80069 RENAL FUNCTION PANEL: CPT

## 2024-07-01 PROCEDURE — 6360000002 HC RX W HCPCS: Performed by: STUDENT IN AN ORGANIZED HEALTH CARE EDUCATION/TRAINING PROGRAM

## 2024-07-01 PROCEDURE — 6370000000 HC RX 637 (ALT 250 FOR IP): Performed by: INTERNAL MEDICINE

## 2024-07-01 PROCEDURE — 6370000000 HC RX 637 (ALT 250 FOR IP): Performed by: HOSPITALIST

## 2024-07-01 PROCEDURE — 94760 N-INVAS EAR/PLS OXIMETRY 1: CPT

## 2024-07-01 PROCEDURE — 85025 COMPLETE CBC W/AUTO DIFF WBC: CPT

## 2024-07-01 PROCEDURE — 36415 COLL VENOUS BLD VENIPUNCTURE: CPT

## 2024-07-01 RX ORDER — CLONIDINE HYDROCHLORIDE 0.1 MG/1
0.1 TABLET ORAL EVERY 4 HOURS PRN
Status: DISCONTINUED | OUTPATIENT
Start: 2024-07-01 | End: 2024-07-01 | Stop reason: HOSPADM

## 2024-07-01 RX ORDER — METOPROLOL TARTRATE 50 MG/1
50 TABLET, FILM COATED ORAL 2 TIMES DAILY
Status: DISCONTINUED | OUTPATIENT
Start: 2024-07-01 | End: 2024-07-01 | Stop reason: HOSPADM

## 2024-07-01 RX ADMIN — HYDRALAZINE HYDROCHLORIDE 100 MG: 50 TABLET ORAL at 05:40

## 2024-07-01 RX ADMIN — ACETAMINOPHEN 325MG 650 MG: 325 TABLET ORAL at 09:08

## 2024-07-01 RX ADMIN — TAMSULOSIN HYDROCHLORIDE 0.4 MG: 0.4 CAPSULE ORAL at 09:03

## 2024-07-01 RX ADMIN — NIFEDIPINE 30 MG: 30 TABLET, EXTENDED RELEASE ORAL at 09:03

## 2024-07-01 RX ADMIN — ENOXAPARIN SODIUM 30 MG: 100 INJECTION SUBCUTANEOUS at 09:04

## 2024-07-01 RX ADMIN — FINASTERIDE 5 MG: 5 TABLET, FILM COATED ORAL at 09:04

## 2024-07-01 RX ADMIN — METOPROLOL TARTRATE 25 MG: 25 TABLET, FILM COATED ORAL at 09:03

## 2024-07-01 RX ADMIN — FLUTICASONE PROPIONATE 1 SPRAY: 50 SPRAY, METERED NASAL at 09:12

## 2024-07-01 RX ADMIN — HYDRALAZINE HYDROCHLORIDE 100 MG: 50 TABLET ORAL at 14:46

## 2024-07-01 ASSESSMENT — PAIN DESCRIPTION - LOCATION: LOCATION: GENERALIZED

## 2024-07-01 ASSESSMENT — PAIN SCALES - GENERAL: PAINLEVEL_OUTOF10: 3

## 2024-07-01 ASSESSMENT — PAIN DESCRIPTION - DESCRIPTORS: DESCRIPTORS: ACHING

## 2024-07-01 NOTE — PROGRESS NOTES
[facility]  Phone: [phone]  Fax: [phone]  [employer]         Reason for Consult: EVELINA on CKD    Requesting Physician:  Dr. Fields    History Obtained From: electronic medical record    History of Present Ilness: 83-year-old -American gentleman who was sent from the nursing home with increasing confusion  History obtained from the medical records as patient is unable to provide any reliable information he has history of EVELINA in May 2024 in the setting of sepsis and urinary tract infection his creatinine improved to 1.8 mg at the time of discharge from a peak of 2.8 mg  He has history of metastatic prostate cancer hypertension dementia and chronic indwelling Corado catheter his previous cultures have grown ESBL Klebsiella in the urine and Enterococcus faecalis as well in May 2024  On presentation is noted to have a creatinine of 3.5 mg  He was hypertensive and tachycardic with a temperature of 100.2 degrees  Patient was started on meropenem and vancomycin  We are asked to see him for his EVELINA on CKD    HPI:  Breathing comfortably.  No CP.  ROS:  In bed.  PMFSH:  medications reviewed.      Physical exam:   Constitutional:  VITALS:  BP (!) 167/84   Pulse 99   Temp 98.7 °F (37.1 °C) (Oral)   Resp 15   Ht 1.829 m (6' 0.01\")   Wt 87 kg (191 lb 12.8 oz)   SpO2 96%   BMI 26.01 kg/m²   Gen: alert, awake,confused  Skin: no rash, turgor decreased  Cardiovascular:  S1, S2 without m/r/g tachycardic  Respiratory: CTA B without w/r/r; respiratory effort normal  Abdomen:  +bs, soft, nt, nd, corado in place  Ext: no lower extremity edema    Data/  CBC:   Lab Results   Component Value Date/Time    WBC 14.1 07/01/2024 05:19 AM    RBC 2.96 07/01/2024 05:19 AM    HGB 7.4 07/01/2024 05:19 AM    HCT 23.2 07/01/2024 05:19 AM    MCV 78.3 07/01/2024 05:19 AM    MCH 25.0 07/01/2024 05:19 AM    MCHC 32.0 07/01/2024 05:19 AM    RDW 21.1 07/01/2024 05:19 AM     07/01/2024 05:19 AM    MPV 8.3

## 2024-07-01 NOTE — PLAN OF CARE
Problem: Discharge Planning  Goal: Discharge to home or other facility with appropriate resources  Outcome: Progressing  Flowsheets (Taken 6/30/2024 1949)  Discharge to home or other facility with appropriate resources:   Identify barriers to discharge with patient and caregiver   Arrange for needed discharge resources and transportation as appropriate   Identify discharge learning needs (meds, wound care, etc)     Problem: Skin/Tissue Integrity  Goal: Absence of new skin breakdown  Description: 1.  Monitor for areas of redness and/or skin breakdown  2.  Assess vascular access sites hourly  3.  Every 4-6 hours minimum:  Change oxygen saturation probe site  4.  Every 4-6 hours:  If on nasal continuous positive airway pressure, respiratory therapy assess nares and determine need for appliance change or resting period.  Outcome: Progressing     Problem: Safety - Adult  Goal: Free from fall injury  Outcome: Progressing     Problem: Nutrition Deficit:  Goal: Optimize nutritional status  Outcome: Progressing     Problem: Pain  Goal: Verbalizes/displays adequate comfort level or baseline comfort level  Outcome: Progressing  Flowsheets (Taken 6/30/2024 1949)  Verbalizes/displays adequate comfort level or baseline comfort level:   Encourage patient to monitor pain and request assistance   Assess pain using appropriate pain scale   Administer analgesics based on type and severity of pain and evaluate response   Implement non-pharmacological measures as appropriate and evaluate response   Consider cultural and social influences on pain and pain management     Problem: ABCDS Injury Assessment  Goal: Absence of physical injury  Outcome: Progressing     Problem: Discharge Planning  Goal: Discharge to home or other facility with appropriate resources  Outcome: Progressing  Flowsheets (Taken 6/30/2024 1949)  Discharge to home or other facility with appropriate resources:   Identify barriers to discharge with patient and  caregiver   Arrange for needed discharge resources and transportation as appropriate   Identify discharge learning needs (meds, wound care, etc)     Problem: Discharge Planning  Goal: Discharge to home or other facility with appropriate resources  Outcome: Progressing  Flowsheets (Taken 6/30/2024 1949)  Discharge to home or other facility with appropriate resources:   Identify barriers to discharge with patient and caregiver   Arrange for needed discharge resources and transportation as appropriate   Identify discharge learning needs (meds, wound care, etc)     Problem: Skin/Tissue Integrity  Goal: Absence of new skin breakdown  Description: 1.  Monitor for areas of redness and/or skin breakdown  2.  Assess vascular access sites hourly  3.  Every 4-6 hours minimum:  Change oxygen saturation probe site  4.  Every 4-6 hours:  If on nasal continuous positive airway pressure, respiratory therapy assess nares and determine need for appliance change or resting period.  Outcome: Progressing     Problem: Safety - Adult  Goal: Free from fall injury  Outcome: Progressing     Problem: Nutrition Deficit:  Goal: Optimize nutritional status  Outcome: Progressing     Problem: Pain  Goal: Verbalizes/displays adequate comfort level or baseline comfort level  Outcome: Progressing  Flowsheets (Taken 6/30/2024 1949)  Verbalizes/displays adequate comfort level or baseline comfort level:   Encourage patient to monitor pain and request assistance   Assess pain using appropriate pain scale   Administer analgesics based on type and severity of pain and evaluate response   Implement non-pharmacological measures as appropriate and evaluate response   Consider cultural and social influences on pain and pain management     Problem: ABCDS Injury Assessment  Goal: Absence of physical injury  Outcome: Progressing

## 2024-07-01 NOTE — PROGRESS NOTES
Pt's B/P continues to be elevated throughout the shift. Has received his scheduled Hydralazine . No outward signs of distress. Repositioned for comfort . Bed alarm on .

## 2024-07-01 NOTE — PROGRESS NOTES
Verbal discharge instructions was given to the patient. Pt. Discharged to nursing home with all belongings. Out via stretcher. This nurse attempted to call report to [facility] and was connected to a voicemail.

## 2024-07-01 NOTE — DISCHARGE SUMMARY
V2.0  Discharge Summary    Name:  Reji Kaufman /Age/Sex: 1940 (83 y.o. male)   Admit Date: 2024  Discharge Date: 24    MRN & CSN:  [id number] & [id number] Encounter Date and Time 24 4:14 PM EDT    Attending:  Aziza Kimball MD Discharging Provider: Aziza Kimball MD       Hospital Course:     Brief HPI: Reji Kaufman is a 83 y.o. male who presented with altered mental status    Brief Problem Based Course:   Assessment and Plan:      Severe sepsis: Initially felt to be secondary to urinary tract infection based on presenting parameters and elevated lactic acid cultures remain negative, has a chronic Rose, recent history of ESBL, was placed on antibiotics, seen and evaluated by infectious disease, antibiotics thereafter discontinued, it was felt that the elevated procalcitonin and white count could be from renal failure and cancer respectively    Metabolic encephalopathy: Felt to be secondary to sepsis, felt to have returned back to baseline    Acute on chronic CKD: Patient's baseline creatinine is around 1.9, creatinine elevated, seen by nephrology, they feel the patient is UA consistent with volume depletion cannot rule out ATN, kidney function has remained stable around 3.9, urine output remains good, initially there was a discussion about hemodialysis however patient's son wants to pursue hospice care, patient to be transition to a facility where patient can continue with comfort focused care, patient's CODE STATUS changed to DNR/DNI with comfort care to    Patient also has a history of chronic anemia, acute drop in hemoglobin noted, some of that could be delusional, patient received 1 unit of PRBC, there is no indication for any further transfusion, patient not actively bleeding, patient also has a history of metastatic prostate cancer for which he follows with outpatient providers.    The patient expressed appropriate understanding of, and agreement with the discharge recommendations,

## 2024-07-01 NOTE — CARE COORDINATION
7/1 Call placed to Home at [facility] regarding referral. Facility is unable to accommodate patient needs. Facility only uses [facility] and refuses to accommodate patient choice of [facility]. Ksenia Bhatt denied. [facility] has accepted patient. Son Sanjay called [phone] regarding discharge order and need for decision today regarding returning to [facility] with Hospice vs [facility] with Hospice. He will call  back after going to see the facility this morning. Electronically signed by Tracy Garcia RN on 7/1/2024 at 9:16 AM

## 2024-07-18 NOTE — PROGRESS NOTES
Physician Progress Note      PATIENT:               REINA COOK  CSN #:                  [id number]  :                       1940  ADMIT DATE:       2024 8:07 PM  DISCH DATE:        2024 4:30 PM  RESPONDING  PROVIDER #:        Aziza Sharma MD          QUERY TEXT:    Pt admitted with EVELINA. Pt noted to have metabolic encephalopathy initially felt   to be related to sepsis but sepsis has since then been ruled out. If   possible, please document in progress notes and discharge summary the   relationship, if any, between EVELINA and metabolic encephalopathy.    The medical record reflects the following:  Risk Factors: EVELINA, cancer, dementia  Clinical Indicators: increasing confusion above his baseline, \"felt to have   returned back to baseline\" noted in DC Summary  Treatment: neuro checks, serial labs, supportive care  Options provided:  -- Metabolic encephalopathy due to EVELINA  -- Metabolic encephalopathy due to other, Please document other cause.  -- Other - I will add my own diagnosis  -- Disagree - Not applicable / Not valid  -- Disagree - Clinically unable to determine / Unknown  -- Refer to Clinical Documentation Reviewer    PROVIDER RESPONSE TEXT:    Metabolic encephalopathy due to EVELINA    Query created by: Noemí Santiago on 2024 12:06 PM      Electronically signed by:  Aziza Sharma MD 2024 12:27 PM

## (undated) DEVICE — BITE BLOCK ENDOSCP AD 60 FR W/ ADJ STRP PLAS GRN BLOX

## (undated) DEVICE — ENDOSCOPY KIT: Brand: MEDLINE INDUSTRIES, INC.